# Patient Record
Sex: FEMALE | Race: WHITE | NOT HISPANIC OR LATINO
[De-identification: names, ages, dates, MRNs, and addresses within clinical notes are randomized per-mention and may not be internally consistent; named-entity substitution may affect disease eponyms.]

---

## 2017-01-09 ENCOUNTER — RESULT REVIEW (OUTPATIENT)
Age: 82
End: 2017-01-09

## 2017-06-07 ENCOUNTER — APPOINTMENT (OUTPATIENT)
Dept: ORTHOPEDIC SURGERY | Facility: CLINIC | Age: 82
End: 2017-06-07

## 2017-06-07 VITALS — BODY MASS INDEX: 23.16 KG/M2 | HEIGHT: 60 IN | WEIGHT: 118 LBS

## 2017-06-07 VITALS — HEART RATE: 109 BPM | SYSTOLIC BLOOD PRESSURE: 125 MMHG | DIASTOLIC BLOOD PRESSURE: 82 MMHG

## 2017-06-23 ENCOUNTER — APPOINTMENT (OUTPATIENT)
Dept: ORTHOPEDIC SURGERY | Facility: CLINIC | Age: 82
End: 2017-06-23

## 2017-07-12 ENCOUNTER — APPOINTMENT (OUTPATIENT)
Dept: ORTHOPEDIC SURGERY | Facility: CLINIC | Age: 82
End: 2017-07-12

## 2017-07-14 ENCOUNTER — APPOINTMENT (OUTPATIENT)
Dept: ORTHOPEDIC SURGERY | Facility: CLINIC | Age: 82
End: 2017-07-14

## 2017-07-14 VITALS
BODY MASS INDEX: 23.56 KG/M2 | HEART RATE: 79 BPM | SYSTOLIC BLOOD PRESSURE: 135 MMHG | WEIGHT: 120 LBS | DIASTOLIC BLOOD PRESSURE: 82 MMHG | HEIGHT: 60 IN

## 2017-07-14 DIAGNOSIS — M54.16 RADICULOPATHY, LUMBAR REGION: ICD-10-CM

## 2017-07-14 DIAGNOSIS — M51.26 OTHER INTERVERTEBRAL DISC DISPLACEMENT, LUMBAR REGION: ICD-10-CM

## 2017-07-26 ENCOUNTER — OUTPATIENT (OUTPATIENT)
Dept: OUTPATIENT SERVICES | Facility: HOSPITAL | Age: 82
LOS: 1 days | End: 2017-07-26
Payer: MEDICARE

## 2017-07-26 VITALS
TEMPERATURE: 98 F | HEIGHT: 60 IN | SYSTOLIC BLOOD PRESSURE: 120 MMHG | DIASTOLIC BLOOD PRESSURE: 84 MMHG | HEART RATE: 76 BPM | WEIGHT: 117.07 LBS | RESPIRATION RATE: 14 BRPM

## 2017-07-26 DIAGNOSIS — Z90.49 ACQUIRED ABSENCE OF OTHER SPECIFIED PARTS OF DIGESTIVE TRACT: Chronic | ICD-10-CM

## 2017-07-26 DIAGNOSIS — Z90.3 ACQUIRED ABSENCE OF STOMACH [PART OF]: Chronic | ICD-10-CM

## 2017-07-26 DIAGNOSIS — M54.16 RADICULOPATHY, LUMBAR REGION: ICD-10-CM

## 2017-07-26 DIAGNOSIS — Z98.890 OTHER SPECIFIED POSTPROCEDURAL STATES: Chronic | ICD-10-CM

## 2017-07-26 DIAGNOSIS — M51.26 OTHER INTERVERTEBRAL DISC DISPLACEMENT, LUMBAR REGION: ICD-10-CM

## 2017-07-26 DIAGNOSIS — I35.0 NONRHEUMATIC AORTIC (VALVE) STENOSIS: ICD-10-CM

## 2017-07-26 DIAGNOSIS — Z98.41 CATARACT EXTRACTION STATUS, RIGHT EYE: Chronic | ICD-10-CM

## 2017-07-26 LAB
ANTIBODY ID 1_1: SIGNIFICANT CHANGE UP
ANTIBODY ID 1_2: SIGNIFICANT CHANGE UP
APTT BLD: 37.4 SEC — SIGNIFICANT CHANGE UP (ref 27.5–37.4)
BLD GP AB SCN SERPL QL: POSITIVE — SIGNIFICANT CHANGE UP
BUN SERPL-MCNC: 19 MG/DL — SIGNIFICANT CHANGE UP (ref 7–23)
CALCIUM SERPL-MCNC: 9.6 MG/DL — SIGNIFICANT CHANGE UP (ref 8.4–10.5)
CHLORIDE SERPL-SCNC: 104 MMOL/L — SIGNIFICANT CHANGE UP (ref 98–107)
CO2 SERPL-SCNC: 24 MMOL/L — SIGNIFICANT CHANGE UP (ref 22–31)
CREAT SERPL-MCNC: 0.91 MG/DL — SIGNIFICANT CHANGE UP (ref 0.5–1.3)
DAT POLY-SP REAG RBC QL: NEGATIVE — SIGNIFICANT CHANGE UP
GGT SERPL-CCNC: 19 U/L — SIGNIFICANT CHANGE UP (ref 5–36)
GLUCOSE SERPL-MCNC: 93 MG/DL — SIGNIFICANT CHANGE UP (ref 70–99)
HBA1C BLD-MCNC: 6.1 % — HIGH (ref 4–5.6)
HCT VFR BLD CALC: 40.8 % — SIGNIFICANT CHANGE UP (ref 34.5–45)
HGB BLD-MCNC: 13.3 G/DL — SIGNIFICANT CHANGE UP (ref 11.5–15.5)
INR BLD: 1.06 — SIGNIFICANT CHANGE UP (ref 0.88–1.17)
MCHC RBC-ENTMCNC: 29.8 PG — SIGNIFICANT CHANGE UP (ref 27–34)
MCHC RBC-ENTMCNC: 32.6 % — SIGNIFICANT CHANGE UP (ref 32–36)
MCV RBC AUTO: 91.5 FL — SIGNIFICANT CHANGE UP (ref 80–100)
NRBC # FLD: 0 — SIGNIFICANT CHANGE UP
PLATELET # BLD AUTO: 209 K/UL — SIGNIFICANT CHANGE UP (ref 150–400)
PMV BLD: 11.7 FL — SIGNIFICANT CHANGE UP (ref 7–13)
POTASSIUM SERPL-MCNC: 4.1 MMOL/L — SIGNIFICANT CHANGE UP (ref 3.5–5.3)
POTASSIUM SERPL-SCNC: 4.1 MMOL/L — SIGNIFICANT CHANGE UP (ref 3.5–5.3)
PROTHROM AB SERPL-ACNC: 11.9 SEC — SIGNIFICANT CHANGE UP (ref 9.8–13.1)
RBC # BLD: 4.46 M/UL — SIGNIFICANT CHANGE UP (ref 3.8–5.2)
RBC # FLD: 13.3 % — SIGNIFICANT CHANGE UP (ref 10.3–14.5)
RH IG SCN BLD-IMP: NEGATIVE — SIGNIFICANT CHANGE UP
SODIUM SERPL-SCNC: 142 MMOL/L — SIGNIFICANT CHANGE UP (ref 135–145)
WBC # BLD: 8.4 K/UL — SIGNIFICANT CHANGE UP (ref 3.8–10.5)
WBC # FLD AUTO: 8.4 K/UL — SIGNIFICANT CHANGE UP (ref 3.8–10.5)

## 2017-07-26 PROCEDURE — 86077 PHYS BLOOD BANK SERV XMATCH: CPT

## 2017-07-26 NOTE — H&P PST ADULT - RS GEN PE MLT RESP DETAILS PC
good air movement/no chest wall tenderness/breath sounds equal/no intercostal retractions/airway patent/respirations non-labored/clear to auscultation bilaterally/no wheezes

## 2017-07-26 NOTE — H&P PST ADULT - PSH
H/O laminectomy  april 2005  History of partial gastrectomy  50% of stomach was removed.  S/P appendectomy    S/P bilateral cataract extraction  10 years ago H/O laminectomy  april 2005  History of partial gastrectomy  secondary to ulcer  S/P appendectomy    S/P bilateral cataract extraction  10 years ago

## 2017-07-26 NOTE — H&P PST ADULT - PROBLEM SELECTOR PLAN 1
Scheduled for L4-L5 Lumbar Laminectomy on 8/10/2017.   Pre op instructions given, pt verbalized understanding   Chlorhexidine wash and GI prophylaxis provided  Pt was seen by her PCP for medical clearance and referred to cardiology   Medical clearance pending

## 2017-07-26 NOTE — H&P PST ADULT - NEGATIVE NEUROLOGICAL SYMPTOMS
no hemiparesis/no focal seizures/no weakness/no syncope/no tremors/no generalized seizures/no facial palsy/no loss of consciousness/no confusion/no paresthesias/no transient paralysis/no vertigo/no headache

## 2017-07-26 NOTE — H&P PST ADULT - NEGATIVE CARDIOVASCULAR SYMPTOMS
no chest pain/no orthopnea/no paroxysmal nocturnal dyspnea/no peripheral edema/no dyspnea on exertion/no palpitations/no claudication

## 2017-07-26 NOTE — H&P PST ADULT - HISTORY OF PRESENT ILLNESS
82 y/o female presents to [facility] for preoperative evaluation with diagnosis of intervertebral disc displacement, lumbar region and radiculopathy. H/o lumbar back pain x 14 years and s/p lumbar laminectomy in April 2005 with relief of pain. Patient states 2 years ago lower back pain returned and became severe limiting her ROM. She also reports difficulty standing straight for prolonged periods and radiating pain to her left leg with ambulation. She is scheduled for L4-L5 Lumbar Laminectomy on 8/10/2017.

## 2017-07-26 NOTE — H&P PST ADULT - PROBLEM SELECTOR PLAN 2
Pt was seen by her cardiologist on 7/25 for cardiac clearance  She is scheduled for myocardial perfusion imaging on 7/27, clearance pending   2D ECHO and EKG in chart

## 2017-07-26 NOTE — H&P PST ADULT - NEGATIVE GENERAL GENITOURINARY SYMPTOMS
no bladder infections/no renal colic/no incontinence/no urinary hesitancy/no flank pain L/no flank pain R/normal urinary frequency/no hematuria

## 2017-07-26 NOTE — H&P PST ADULT - NEGATIVE BREAST SYMPTOMS
no breast lump R/no breast lump L/no nipple discharge R/no breast tenderness L/no breast tenderness R/no nipple discharge L

## 2017-07-26 NOTE — H&P PST ADULT - NSANTHOSAYNRD_GEN_A_CORE
No. PETTY screening performed.  STOP BANG Legend: 0-2 = LOW Risk; 3-4 = INTERMEDIATE Risk; 5-8 = HIGH Risk/never tested

## 2017-07-26 NOTE — H&P PST ADULT - PMH
Abnormal echocardiogram  pt currently wearing a holter monitor  Acid reflux    Aortic stenosis  moderate  Bleeding disorder  pt states her blood takes a long time to clot  Borderline diabetes    HLD (hyperlipidemia)    Other intervertebral disc displacement, lumbar region    Poor historian    Radiculopathy    Shingles outbreak  in 2005 2 months after back surgery Abnormal echocardiogram  pt currently wearing a holter monitor  Acid reflux    Ankle fracture  left  Aortic stenosis  moderate  Bleeding disorder  pt states her blood takes a long time to clot  Borderline diabetes    HLD (hyperlipidemia)    Osteoporosis    Other intervertebral disc displacement, lumbar region    Poor historian    Radiculopathy    Shingles outbreak  in 2005 2 months after back surgery Abnormal echocardiogram  pt currently wearing a holter monitor  Acid reflux    Ankle fracture  left  Aortic stenosis  moderate by ECHO 7/2017  Bleeding disorder  pt states her blood takes a long time to clot  Borderline diabetes    Former smoker    HLD (hyperlipidemia)    Osteoporosis    Other intervertebral disc displacement, lumbar region    Poor historian    Radiculopathy    Shingles outbreak  in 2005 2 months after back surgery

## 2017-07-26 NOTE — H&P PST ADULT - NEGATIVE OPHTHALMOLOGIC SYMPTOMS
no photophobia/no lacrimation R/no diplopia/no blurred vision R/no blurred vision L/no pain R/no pain L

## 2017-08-02 ENCOUNTER — APPOINTMENT (OUTPATIENT)
Dept: ORTHOPEDIC SURGERY | Facility: CLINIC | Age: 82
End: 2017-08-02
Payer: MEDICARE

## 2017-08-02 DIAGNOSIS — S82.892A OTHER FRACTURE OF LEFT LOWER LEG, INITIAL ENCOUNTER FOR CLOSED FRACTURE: ICD-10-CM

## 2017-08-02 PROCEDURE — 99213 OFFICE O/P EST LOW 20 MIN: CPT

## 2017-08-02 PROCEDURE — 73610 X-RAY EXAM OF ANKLE: CPT | Mod: LT

## 2017-08-04 PROBLEM — S82.892A OTHER FRACTURE OF LEFT LOWER LEG, INITIAL ENCOUNTER FOR CLOSED FRACTURE: Status: ACTIVE | Noted: 2017-06-23

## 2017-08-10 ENCOUNTER — APPOINTMENT (OUTPATIENT)
Dept: ORTHOPEDIC SURGERY | Facility: HOSPITAL | Age: 82
End: 2017-08-10

## 2017-08-10 ENCOUNTER — INPATIENT (INPATIENT)
Facility: HOSPITAL | Age: 82
LOS: 5 days | Discharge: HOME HEALTH | End: 2017-08-16
Attending: ORTHOPAEDIC SURGERY | Admitting: ORTHOPAEDIC SURGERY
Payer: MEDICARE

## 2017-08-10 ENCOUNTER — TRANSCRIPTION ENCOUNTER (OUTPATIENT)
Age: 82
End: 2017-08-10

## 2017-08-10 ENCOUNTER — RESULT REVIEW (OUTPATIENT)
Age: 82
End: 2017-08-10

## 2017-08-10 VITALS
HEIGHT: 60 IN | OXYGEN SATURATION: 96 % | TEMPERATURE: 98 F | RESPIRATION RATE: 18 BRPM | SYSTOLIC BLOOD PRESSURE: 140 MMHG | HEART RATE: 78 BPM | DIASTOLIC BLOOD PRESSURE: 78 MMHG | WEIGHT: 117.07 LBS

## 2017-08-10 DIAGNOSIS — Z90.3 ACQUIRED ABSENCE OF STOMACH [PART OF]: Chronic | ICD-10-CM

## 2017-08-10 DIAGNOSIS — Z98.41 CATARACT EXTRACTION STATUS, RIGHT EYE: Chronic | ICD-10-CM

## 2017-08-10 DIAGNOSIS — Z90.49 ACQUIRED ABSENCE OF OTHER SPECIFIED PARTS OF DIGESTIVE TRACT: Chronic | ICD-10-CM

## 2017-08-10 DIAGNOSIS — Z98.890 OTHER SPECIFIED POSTPROCEDURAL STATES: Chronic | ICD-10-CM

## 2017-08-10 DIAGNOSIS — M54.16 RADICULOPATHY, LUMBAR REGION: ICD-10-CM

## 2017-08-10 LAB
BASOPHILS # BLD AUTO: 0.05 K/UL — SIGNIFICANT CHANGE UP (ref 0–0.2)
BASOPHILS NFR BLD AUTO: 0.4 % — SIGNIFICANT CHANGE UP (ref 0–2)
BUN SERPL-MCNC: 17 MG/DL — SIGNIFICANT CHANGE UP (ref 7–23)
CALCIUM SERPL-MCNC: 8.6 MG/DL — SIGNIFICANT CHANGE UP (ref 8.4–10.5)
CHLORIDE SERPL-SCNC: 108 MMOL/L — HIGH (ref 98–107)
CO2 SERPL-SCNC: 23 MMOL/L — SIGNIFICANT CHANGE UP (ref 22–31)
CREAT SERPL-MCNC: 0.86 MG/DL — SIGNIFICANT CHANGE UP (ref 0.5–1.3)
EOSINOPHIL # BLD AUTO: 0.14 K/UL — SIGNIFICANT CHANGE UP (ref 0–0.5)
EOSINOPHIL NFR BLD AUTO: 1.2 % — SIGNIFICANT CHANGE UP (ref 0–6)
GLUCOSE SERPL-MCNC: 154 MG/DL — HIGH (ref 70–99)
HCT VFR BLD CALC: 36.1 % — SIGNIFICANT CHANGE UP (ref 34.5–45)
HGB BLD-MCNC: 11.7 G/DL — SIGNIFICANT CHANGE UP (ref 11.5–15.5)
IMM GRANULOCYTES # BLD AUTO: 0.06 # — SIGNIFICANT CHANGE UP
IMM GRANULOCYTES NFR BLD AUTO: 0.5 % — SIGNIFICANT CHANGE UP (ref 0–1.5)
LYMPHOCYTES # BLD AUTO: 1.11 K/UL — SIGNIFICANT CHANGE UP (ref 1–3.3)
LYMPHOCYTES # BLD AUTO: 9.2 % — LOW (ref 13–44)
MCHC RBC-ENTMCNC: 29.5 PG — SIGNIFICANT CHANGE UP (ref 27–34)
MCHC RBC-ENTMCNC: 32.4 % — SIGNIFICANT CHANGE UP (ref 32–36)
MCV RBC AUTO: 90.9 FL — SIGNIFICANT CHANGE UP (ref 80–100)
MONOCYTES # BLD AUTO: 0.73 K/UL — SIGNIFICANT CHANGE UP (ref 0–0.9)
MONOCYTES NFR BLD AUTO: 6 % — SIGNIFICANT CHANGE UP (ref 2–14)
NEUTROPHILS # BLD AUTO: 10.02 K/UL — HIGH (ref 1.8–7.4)
NEUTROPHILS NFR BLD AUTO: 82.7 % — HIGH (ref 43–77)
NRBC # FLD: 0 — SIGNIFICANT CHANGE UP
PLATELET # BLD AUTO: 203 K/UL — SIGNIFICANT CHANGE UP (ref 150–400)
PMV BLD: 11.2 FL — SIGNIFICANT CHANGE UP (ref 7–13)
POTASSIUM SERPL-MCNC: 4.6 MMOL/L — SIGNIFICANT CHANGE UP (ref 3.5–5.3)
POTASSIUM SERPL-SCNC: 4.6 MMOL/L — SIGNIFICANT CHANGE UP (ref 3.5–5.3)
RBC # BLD: 3.97 M/UL — SIGNIFICANT CHANGE UP (ref 3.8–5.2)
RBC # FLD: 13.2 % — SIGNIFICANT CHANGE UP (ref 10.3–14.5)
RH IG SCN BLD-IMP: NEGATIVE — SIGNIFICANT CHANGE UP
SODIUM SERPL-SCNC: 140 MMOL/L — SIGNIFICANT CHANGE UP (ref 135–145)
WBC # BLD: 12.11 K/UL — HIGH (ref 3.8–10.5)
WBC # FLD AUTO: 12.11 K/UL — HIGH (ref 3.8–10.5)

## 2017-08-10 PROCEDURE — 72100 X-RAY EXAM L-S SPINE 2/3 VWS: CPT | Mod: 26

## 2017-08-10 PROCEDURE — 22612 ARTHRD PST TQ 1NTRSPC LUMBAR: CPT

## 2017-08-10 PROCEDURE — 88304 TISSUE EXAM BY PATHOLOGIST: CPT | Mod: 26

## 2017-08-10 PROCEDURE — 63048 LAM FACETEC &FORAMOT EA ADDL: CPT

## 2017-08-10 PROCEDURE — 63047 LAM FACETEC & FORAMOT LUMBAR: CPT | Mod: 82

## 2017-08-10 PROCEDURE — 22612 ARTHRD PST TQ 1NTRSPC LUMBAR: CPT | Mod: 82

## 2017-08-10 PROCEDURE — 88311 DECALCIFY TISSUE: CPT | Mod: 26

## 2017-08-10 PROCEDURE — 63267 EXCISE INTRSPINL LESION LMBR: CPT | Mod: 82,59

## 2017-08-10 PROCEDURE — 63048 LAM FACETEC &FORAMOT EA ADDL: CPT | Mod: 82

## 2017-08-10 PROCEDURE — 63267 EXCISE INTRSPINL LESION LMBR: CPT | Mod: 59

## 2017-08-10 PROCEDURE — 63047 LAM FACETEC & FORAMOT LUMBAR: CPT

## 2017-08-10 RX ORDER — DOCUSATE SODIUM 100 MG
100 CAPSULE ORAL THREE TIMES A DAY
Qty: 0 | Refills: 0 | Status: DISCONTINUED | OUTPATIENT
Start: 2017-08-10 | End: 2017-08-16

## 2017-08-10 RX ORDER — OXYCODONE AND ACETAMINOPHEN 5; 325 MG/1; MG/1
1 TABLET ORAL EVERY 4 HOURS
Qty: 0 | Refills: 0 | Status: DISCONTINUED | OUTPATIENT
Start: 2017-08-10 | End: 2017-08-11

## 2017-08-10 RX ORDER — ONDANSETRON 8 MG/1
4 TABLET, FILM COATED ORAL EVERY 6 HOURS
Qty: 0 | Refills: 0 | Status: DISCONTINUED | OUTPATIENT
Start: 2017-08-10 | End: 2017-08-12

## 2017-08-10 RX ORDER — METOPROLOL TARTRATE 50 MG
25 TABLET ORAL DAILY
Qty: 0 | Refills: 0 | Status: DISCONTINUED | OUTPATIENT
Start: 2017-08-10 | End: 2017-08-16

## 2017-08-10 RX ORDER — FAMOTIDINE 10 MG/ML
20 INJECTION INTRAVENOUS EVERY 24 HOURS
Qty: 0 | Refills: 0 | Status: DISCONTINUED | OUTPATIENT
Start: 2017-08-10 | End: 2017-08-10

## 2017-08-10 RX ORDER — DIPHENHYDRAMINE HCL 50 MG
12.5 CAPSULE ORAL EVERY 4 HOURS
Qty: 0 | Refills: 0 | Status: DISCONTINUED | OUTPATIENT
Start: 2017-08-10 | End: 2017-08-16

## 2017-08-10 RX ORDER — OXYCODONE AND ACETAMINOPHEN 5; 325 MG/1; MG/1
2 TABLET ORAL EVERY 6 HOURS
Qty: 0 | Refills: 0 | Status: DISCONTINUED | OUTPATIENT
Start: 2017-08-10 | End: 2017-08-11

## 2017-08-10 RX ORDER — ACETAMINOPHEN 500 MG
650 TABLET ORAL EVERY 6 HOURS
Qty: 0 | Refills: 0 | Status: DISCONTINUED | OUTPATIENT
Start: 2017-08-10 | End: 2017-08-11

## 2017-08-10 RX ORDER — SENNA PLUS 8.6 MG/1
2 TABLET ORAL AT BEDTIME
Qty: 0 | Refills: 0 | Status: DISCONTINUED | OUTPATIENT
Start: 2017-08-10 | End: 2017-08-16

## 2017-08-10 RX ORDER — SODIUM CHLORIDE 9 MG/ML
1000 INJECTION INTRAMUSCULAR; INTRAVENOUS; SUBCUTANEOUS
Qty: 0 | Refills: 0 | Status: DISCONTINUED | OUTPATIENT
Start: 2017-08-10 | End: 2017-08-12

## 2017-08-10 RX ORDER — ACETAMINOPHEN 500 MG
650 TABLET ORAL EVERY 6 HOURS
Qty: 0 | Refills: 0 | Status: DISCONTINUED | OUTPATIENT
Start: 2017-08-10 | End: 2017-08-16

## 2017-08-10 RX ORDER — SIMVASTATIN 20 MG/1
20 TABLET, FILM COATED ORAL AT BEDTIME
Qty: 0 | Refills: 0 | Status: DISCONTINUED | OUTPATIENT
Start: 2017-08-10 | End: 2017-08-16

## 2017-08-10 RX ORDER — GABAPENTIN 400 MG/1
600 CAPSULE ORAL
Qty: 0 | Refills: 0 | Status: DISCONTINUED | OUTPATIENT
Start: 2017-08-10 | End: 2017-08-11

## 2017-08-10 RX ORDER — PANTOPRAZOLE SODIUM 20 MG/1
40 TABLET, DELAYED RELEASE ORAL
Qty: 0 | Refills: 0 | Status: DISCONTINUED | OUTPATIENT
Start: 2017-08-10 | End: 2017-08-16

## 2017-08-10 RX ORDER — HYDROMORPHONE HYDROCHLORIDE 2 MG/ML
0.5 INJECTION INTRAMUSCULAR; INTRAVENOUS; SUBCUTANEOUS
Qty: 0 | Refills: 0 | Status: DISCONTINUED | OUTPATIENT
Start: 2017-08-10 | End: 2017-08-10

## 2017-08-10 RX ADMIN — ONDANSETRON 4 MILLIGRAM(S): 8 TABLET, FILM COATED ORAL at 12:08

## 2017-08-10 RX ADMIN — Medication 100 MILLIGRAM(S): at 21:16

## 2017-08-10 RX ADMIN — SIMVASTATIN 20 MILLIGRAM(S): 20 TABLET, FILM COATED ORAL at 21:16

## 2017-08-10 RX ADMIN — GABAPENTIN 600 MILLIGRAM(S): 400 CAPSULE ORAL at 15:28

## 2017-08-10 RX ADMIN — OXYCODONE AND ACETAMINOPHEN 2 TABLET(S): 5; 325 TABLET ORAL at 19:34

## 2017-08-10 RX ADMIN — Medication 25 MILLIGRAM(S): at 15:28

## 2017-08-10 RX ADMIN — SENNA PLUS 2 TABLET(S): 8.6 TABLET ORAL at 21:16

## 2017-08-10 RX ADMIN — GABAPENTIN 600 MILLIGRAM(S): 400 CAPSULE ORAL at 19:06

## 2017-08-10 RX ADMIN — OXYCODONE AND ACETAMINOPHEN 2 TABLET(S): 5; 325 TABLET ORAL at 20:30

## 2017-08-10 RX ADMIN — SODIUM CHLORIDE 75 MILLILITER(S): 9 INJECTION INTRAMUSCULAR; INTRAVENOUS; SUBCUTANEOUS at 11:30

## 2017-08-10 RX ADMIN — Medication 100 MILLIGRAM(S): at 15:28

## 2017-08-10 NOTE — ASU PATIENT PROFILE, ADULT - PSH
H/O laminectomy  april 2005  History of partial gastrectomy  secondary to ulcer  S/P appendectomy    S/P bilateral cataract extraction  10 years ago

## 2017-08-10 NOTE — BRIEF OPERATIVE NOTE - POST-OP DX
Lumbar herniated disc  08/10/2017    Active  Sheldon Gale  Lumbar stenosis  08/10/2017    Active  Sheldon Gale

## 2017-08-10 NOTE — ASU PATIENT PROFILE, ADULT - PMH
Abnormal echocardiogram  pt currently wearing a holter monitor  Acid reflux    Ankle fracture  left  Aortic stenosis  moderate by ECHO 7/2017  Bleeding disorder  pt states her blood takes a long time to clot  Borderline diabetes    Former smoker    HLD (hyperlipidemia)    Osteoporosis    Other intervertebral disc displacement, lumbar region    Poor historian    Radiculopathy    Shingles outbreak  in 2005 2 months after back surgery

## 2017-08-10 NOTE — PROGRESS NOTE ADULT - SUBJECTIVE AND OBJECTIVE BOX
ORTHO-POST OP CHECK     EAMON WELLS 81y Female is now s/p rev L4-L5 Lami/disc insitu fusion POD #0. Pt was seen and evaluated at bedside. Pain well controlled, pt denies any cp/sob/n/v/ha at this time.     Vital Signs Last 24 Hrs  T(C): 36.4 (10 Aug 2017 13:44), Max: 36.6 (10 Aug 2017 07:06)  T(F): 97.5 (10 Aug 2017 13:44), Max: 97.9 (10 Aug 2017 07:06)  HR: 66 (10 Aug 2017 13:44) (52 - 78)  BP: 136/75 (10 Aug 2017 13:44) (121/64 - 140/78)  BP(mean): --  RR: 16 (10 Aug 2017 13:44) (12 - 19)  SpO2: 96% (10 Aug 2017 13:44) (95% - 100%)                        11.7   12.11 )-----------( 203      ( 10 Aug 2017 10:22 )             36.1        PE:  Gen: NAD  Spine:  BLE   Dressing C/D/I HV in place   Motor: EHL/FHL/TA/G/S intact 5/5 B/L   Sensation: Intact to light touch bilaterally   Compartments soft compressible  Distal pulses present     A/P   EAMON WELLS 81y Female is now s/p rev L4-L5 Lami/disc insitu fusion POD #0. Pain well controlled denies any cp/sob/n/v/ha at this time.  -Pain control  -DVT ppx  -Antibiotics x24hrs  -IVF  -PO Diet  -PT/OT  -WBAT, OOB

## 2017-08-10 NOTE — BRIEF OPERATIVE NOTE - PROCEDURE
In situ fusion of spine  08/10/2017  L4/5  Active  SALEXANDER  Laminectomy and microdiscectomy, lumbar  08/10/2017  REVISION L4/5 LAMI/DISC ON LEFT  Active  SALEXANDER

## 2017-08-11 DIAGNOSIS — R73.03 PREDIABETES: ICD-10-CM

## 2017-08-11 DIAGNOSIS — K21.9 GASTRO-ESOPHAGEAL REFLUX DISEASE WITHOUT ESOPHAGITIS: ICD-10-CM

## 2017-08-11 DIAGNOSIS — I35.0 NONRHEUMATIC AORTIC (VALVE) STENOSIS: ICD-10-CM

## 2017-08-11 DIAGNOSIS — M51.26 OTHER INTERVERTEBRAL DISC DISPLACEMENT, LUMBAR REGION: ICD-10-CM

## 2017-08-11 DIAGNOSIS — E78.4 OTHER HYPERLIPIDEMIA: ICD-10-CM

## 2017-08-11 LAB
BASOPHILS # BLD AUTO: 0.03 K/UL — SIGNIFICANT CHANGE UP (ref 0–0.2)
BASOPHILS NFR BLD AUTO: 0.2 % — SIGNIFICANT CHANGE UP (ref 0–2)
BUN SERPL-MCNC: 17 MG/DL — SIGNIFICANT CHANGE UP (ref 7–23)
CALCIUM SERPL-MCNC: 8.7 MG/DL — SIGNIFICANT CHANGE UP (ref 8.4–10.5)
CHLORIDE SERPL-SCNC: 104 MMOL/L — SIGNIFICANT CHANGE UP (ref 98–107)
CO2 SERPL-SCNC: 22 MMOL/L — SIGNIFICANT CHANGE UP (ref 22–31)
CREAT SERPL-MCNC: 0.88 MG/DL — SIGNIFICANT CHANGE UP (ref 0.5–1.3)
EOSINOPHIL # BLD AUTO: 0.01 K/UL — SIGNIFICANT CHANGE UP (ref 0–0.5)
EOSINOPHIL NFR BLD AUTO: 0.1 % — SIGNIFICANT CHANGE UP (ref 0–6)
GLUCOSE SERPL-MCNC: 106 MG/DL — HIGH (ref 70–99)
HCT VFR BLD CALC: 37.5 % — SIGNIFICANT CHANGE UP (ref 34.5–45)
HGB BLD-MCNC: 12.1 G/DL — SIGNIFICANT CHANGE UP (ref 11.5–15.5)
IMM GRANULOCYTES # BLD AUTO: 0.05 # — SIGNIFICANT CHANGE UP
IMM GRANULOCYTES NFR BLD AUTO: 0.4 % — SIGNIFICANT CHANGE UP (ref 0–1.5)
LYMPHOCYTES # BLD AUTO: 0.94 K/UL — LOW (ref 1–3.3)
LYMPHOCYTES # BLD AUTO: 6.8 % — LOW (ref 13–44)
MCHC RBC-ENTMCNC: 29.5 PG — SIGNIFICANT CHANGE UP (ref 27–34)
MCHC RBC-ENTMCNC: 32.3 % — SIGNIFICANT CHANGE UP (ref 32–36)
MCV RBC AUTO: 91.5 FL — SIGNIFICANT CHANGE UP (ref 80–100)
MONOCYTES # BLD AUTO: 1.06 K/UL — HIGH (ref 0–0.9)
MONOCYTES NFR BLD AUTO: 7.7 % — SIGNIFICANT CHANGE UP (ref 2–14)
NEUTROPHILS # BLD AUTO: 11.71 K/UL — HIGH (ref 1.8–7.4)
NEUTROPHILS NFR BLD AUTO: 84.8 % — HIGH (ref 43–77)
NRBC # FLD: 0 — SIGNIFICANT CHANGE UP
PLATELET # BLD AUTO: 195 K/UL — SIGNIFICANT CHANGE UP (ref 150–400)
PMV BLD: 11.8 FL — SIGNIFICANT CHANGE UP (ref 7–13)
POTASSIUM SERPL-MCNC: 5.3 MMOL/L — SIGNIFICANT CHANGE UP (ref 3.5–5.3)
POTASSIUM SERPL-SCNC: 5.3 MMOL/L — SIGNIFICANT CHANGE UP (ref 3.5–5.3)
RBC # BLD: 4.1 M/UL — SIGNIFICANT CHANGE UP (ref 3.8–5.2)
RBC # FLD: 13.3 % — SIGNIFICANT CHANGE UP (ref 10.3–14.5)
SODIUM SERPL-SCNC: 141 MMOL/L — SIGNIFICANT CHANGE UP (ref 135–145)
WBC # BLD: 13.8 K/UL — HIGH (ref 3.8–10.5)
WBC # FLD AUTO: 13.8 K/UL — HIGH (ref 3.8–10.5)

## 2017-08-11 PROCEDURE — 99223 1ST HOSP IP/OBS HIGH 75: CPT | Mod: AI

## 2017-08-11 RX ORDER — GABAPENTIN 400 MG/1
600 CAPSULE ORAL THREE TIMES A DAY
Qty: 0 | Refills: 0 | Status: DISCONTINUED | OUTPATIENT
Start: 2017-08-11 | End: 2017-08-16

## 2017-08-11 RX ORDER — OXYCODONE HYDROCHLORIDE 5 MG/1
10 TABLET ORAL EVERY 4 HOURS
Qty: 0 | Refills: 0 | Status: DISCONTINUED | OUTPATIENT
Start: 2017-08-11 | End: 2017-08-14

## 2017-08-11 RX ORDER — ACETAMINOPHEN 500 MG
650 TABLET ORAL EVERY 6 HOURS
Qty: 0 | Refills: 0 | Status: COMPLETED | OUTPATIENT
Start: 2017-08-11 | End: 2017-08-13

## 2017-08-11 RX ORDER — OXYCODONE HYDROCHLORIDE 5 MG/1
5 TABLET ORAL EVERY 4 HOURS
Qty: 0 | Refills: 0 | Status: DISCONTINUED | OUTPATIENT
Start: 2017-08-11 | End: 2017-08-14

## 2017-08-11 RX ORDER — OXYCODONE HYDROCHLORIDE 5 MG/1
2.5 TABLET ORAL EVERY 4 HOURS
Qty: 0 | Refills: 0 | Status: DISCONTINUED | OUTPATIENT
Start: 2017-08-11 | End: 2017-08-14

## 2017-08-11 RX ADMIN — GABAPENTIN 600 MILLIGRAM(S): 400 CAPSULE ORAL at 02:12

## 2017-08-11 RX ADMIN — SODIUM CHLORIDE 75 MILLILITER(S): 9 INJECTION INTRAMUSCULAR; INTRAVENOUS; SUBCUTANEOUS at 23:20

## 2017-08-11 RX ADMIN — OXYCODONE AND ACETAMINOPHEN 2 TABLET(S): 5; 325 TABLET ORAL at 06:26

## 2017-08-11 RX ADMIN — SIMVASTATIN 20 MILLIGRAM(S): 20 TABLET, FILM COATED ORAL at 23:07

## 2017-08-11 RX ADMIN — ONDANSETRON 4 MILLIGRAM(S): 8 TABLET, FILM COATED ORAL at 19:14

## 2017-08-11 RX ADMIN — PANTOPRAZOLE SODIUM 40 MILLIGRAM(S): 20 TABLET, DELAYED RELEASE ORAL at 06:20

## 2017-08-11 RX ADMIN — OXYCODONE HYDROCHLORIDE 10 MILLIGRAM(S): 5 TABLET ORAL at 11:00

## 2017-08-11 RX ADMIN — GABAPENTIN 600 MILLIGRAM(S): 400 CAPSULE ORAL at 23:07

## 2017-08-11 RX ADMIN — OXYCODONE AND ACETAMINOPHEN 2 TABLET(S): 5; 325 TABLET ORAL at 07:15

## 2017-08-11 RX ADMIN — Medication 650 MILLIGRAM(S): at 12:15

## 2017-08-11 RX ADMIN — Medication 100 MILLIGRAM(S): at 06:21

## 2017-08-11 RX ADMIN — Medication 100 MILLIGRAM(S): at 23:07

## 2017-08-11 RX ADMIN — OXYCODONE HYDROCHLORIDE 10 MILLIGRAM(S): 5 TABLET ORAL at 10:09

## 2017-08-11 RX ADMIN — SODIUM CHLORIDE 75 MILLILITER(S): 9 INJECTION INTRAMUSCULAR; INTRAVENOUS; SUBCUTANEOUS at 19:15

## 2017-08-11 RX ADMIN — GABAPENTIN 600 MILLIGRAM(S): 400 CAPSULE ORAL at 06:21

## 2017-08-11 RX ADMIN — Medication 100 MILLIGRAM(S): at 12:14

## 2017-08-11 RX ADMIN — SENNA PLUS 2 TABLET(S): 8.6 TABLET ORAL at 23:07

## 2017-08-11 RX ADMIN — Medication 650 MILLIGRAM(S): at 23:07

## 2017-08-11 RX ADMIN — Medication 25 MILLIGRAM(S): at 06:21

## 2017-08-11 NOTE — CONSULT NOTE ADULT - PROBLEM SELECTOR RECOMMENDATION 2
stable. No signs of CHF  monitoring volume status  c/w metoprolol for arrthymia diagnosed 2 weeks ago.

## 2017-08-11 NOTE — CONSULT NOTE ADULT - PROBLEM SELECTOR RECOMMENDATION 9
L4-L5 Lumbar Laminectomy on 8/10/2017. POD #1  management as per ortho  pain controlled with Oxy IR prn  c/w PT

## 2017-08-11 NOTE — CONSULT NOTE ADULT - ASSESSMENT
81 y.o. Female w/ hx high chol, prediabetes, moderate aortic stenosis, GERD, lumbar intervertebral disc displacement w/ radiculopathy, s/p lumbar laminectomy in April 2005 p/w 2 years of recurrent lower back pain now  L4-L5 Lumbar Laminectomy on 8/10/2017. POD #1

## 2017-08-11 NOTE — PROGRESS NOTE ADULT - SUBJECTIVE AND OBJECTIVE BOX
· Subjective and Objective: 	                                                                                             ORTHO-SPINE PROGRESS NOTE      EAMON WELLS 81y Female is now s/p rev L4-L5 Lami/disc insitu fusion POD #1. Pt was seen and evaluated at bedside by  and spine team. Pain well controlled, pt denies any cp/sob/n/v/ha at this time.     Vital Signs Last 24 Hrs  T(C): 36.4 (10 Aug 2017 13:44), Max: 36.6 (10 Aug 2017 07:06)  T(F): 97.5 (10 Aug 2017 13:44), Max: 97.9 (10 Aug 2017 07:06)  HR: 66 (10 Aug 2017 13:44) (52 - 78)  BP: 136/75 (10 Aug 2017 13:44) (121/64 - 140/78)  BP(mean): --  RR: 16 (10 Aug 2017 13:44) (12 - 19)  SpO2: 96% (10 Aug 2017 13:44) (95% - 100%)                        11.7   12.11 )-----------( 203      ( 10 Aug 2017 10:22 )             36.1        PE:  Gen: NAD  Spine:  BLE   Dressing C/D/I HV in place   Motor: EHL/FHL/TA/G/S intact 5/5 B/L   Sensation: Intact to light touch bilaterally   Compartments soft compressible  Distal pulses present     A/P   EAMON WELLS 81y Female is now s/p rev L4-L5 Lami/disc insitu fusion POD #1. Pain well controlled denies any cp/sob/n/v/ha at this time.  -Pain control  -DVT ppx  -Antibiotics x24hrs  -IVF  -PO Diet  -WBAT OOB  -PT/OT  -DC planning     QUEENIE TSAI SPINE COORDINATOR · Subjective and Objective: 	                                                                                             ORTHO-SPINE PROGRESS NOTE      EAMON WELLS 81y Female is now s/p rev L4-L5 Lami/disc insitu fusion POD #1. Pt was seen and evaluated at bedside by  and spine team. Pain well controlled, pt denies any cp/sob/n/v/ha at this time.     Vital Signs Last 24 Hrs  T(C): 36.4 (10 Aug 2017 13:44), Max: 36.6 (10 Aug 2017 07:06)  T(F): 97.5 (10 Aug 2017 13:44), Max: 97.9 (10 Aug 2017 07:06)  HR: 66 (10 Aug 2017 13:44) (52 - 78)  BP: 136/75 (10 Aug 2017 13:44) (121/64 - 140/78)  BP(mean): --  RR: 16 (10 Aug 2017 13:44) (12 - 19)  SpO2: 96% (10 Aug 2017 13:44) (95% - 100%)                        11.7   12.11 )-----------( 203      ( 10 Aug 2017 10:22 )             36.1        PE:  Gen: NAD  Spine:  BLE   Dressing C/D/I HV in place   Motor: EHL/FHL/TA/G/S intact 5/5 B/L   Sensation: Intact to light touch bilaterally   Compartments soft compressible  Distal pulses present     A/P   EAMON WELLS 81y Female is now s/p rev L4-L5 Lami/disc insitu fusion POD #1. Pain well controlled denies any cp/sob/n/v/ha at this time.  -Pain control  -DVT ppx  -Antibiotics x24hrs  -IVF  -PO Diet  -WBAT OOB  -PT/OT  -DC planning   patient was seen and examined by me on rounds today with a spine team, I agree with the above, Dr. eNlly RUELAS PAC SPINE COORDINATOR

## 2017-08-11 NOTE — PROGRESS NOTE ADULT - SUBJECTIVE AND OBJECTIVE BOX
Patient seen and examined. No acute events overnight. Pain well controlled. Will walk with PT. denies numbness/tingling/paresthesias/weakness. Denies headaches. Denies fevers/chills. No other complaints at this time.    HEALTH ISSUES - PROBLEM Dx:          MEDICATIONS  (STANDING):  sodium chloride 0.9%. 1000 milliLiter(s) IV Continuous <Continuous>  docusate sodium 100 milliGRAM(s) Oral three times a day  senna 2 Tablet(s) Oral at bedtime  gabapentin 600 milliGRAM(s) Oral four times a day  simvastatin 20 milliGRAM(s) Oral at bedtime  metoprolol succinate ER 25 milliGRAM(s) Oral daily  pantoprazole    Tablet 40 milliGRAM(s) Oral before breakfast      Allergies    contrast media (gadolinium-based) (Other)  Keflex (Unknown)  penicillin (Unknown)    Intolerances        PAST MEDICAL & SURGICAL HISTORY:  Former smoker  Ankle fracture: left  Osteoporosis  Aortic stenosis: moderate by ECHO 7/2017  Bleeding disorder: pt states her blood takes a long time to clot  Shingles outbreak: in 2005 2 months after back surgery  Poor historian  Abnormal echocardiogram: pt currently wearing a holter monitor  Acid reflux  HLD (hyperlipidemia)  Borderline diabetes  Radiculopathy  Other intervertebral disc displacement, lumbar region  S/P bilateral cataract extraction: 10 years ago  S/P appendectomy  History of partial gastrectomy: secondary to ulcer  H/O laminectomy: april 2005                            11.7   12.11 )-----------( 203      ( 10 Aug 2017 10:22 )             36.1       10 Aug 2017 10:22    140    |  108    |  17     ----------------------------<  154    4.6     |  23     |  0.86     Ca    8.6        10 Aug 2017 10:22                Vital Signs Last 24 Hrs  T(C): 36.6 (08-11-17 @ 06:15), Max: 36.6 (08-10-17 @ 07:06)  T(F): 97.9 (08-11-17 @ 06:15), Max: 97.9 (08-10-17 @ 07:06)  HR: 60 (08-11-17 @ 06:15) (52 - 78)  BP: 134/69 (08-11-17 @ 06:15) (106/61 - 140/78)  BP(mean): --  RR: 16 (08-11-17 @ 06:15) (12 - 19)  SpO2: 96% (08-11-17 @ 06:15) (95% - 100%)    Gen: NAD    Spine PE:  Dressing clean dry intact  HV drain serosang out put: 70/160    Motor:                   C5                C6              C7               C8           T1   R            5/5                5/5            5/5             5/5          5/5  L             5/5               5/5             5/5             5/5          5/5                L2             L3             L4               L5            S1  R         5/5           5/5          5/5             5/5           5/5  L          5/5          5/5           5/5             5/5           5/5    Sensory:            C5         C6         C7      C8       T1        (0=absent, 1=impaired, 2=normal, NT=not testable)  R         2            2           2        2         2  L          2            2           2        2         2               L2          L3         L4      L5       S1         (0=absent, 1=impaired, 2=normal, NT=not testable)  R         2            2            2        2        2  L          2            2           2        2         2      A/P: 81y Female POD L4-5 laminectomy/discectomy and insitu fusion  Pain control  Cont HV drain  WBAT/PT/OT/OOB  Brace for comfort  FU Labs  SCDs  Medical co-management appreciated  dispo planning

## 2017-08-11 NOTE — CONSULT NOTE ADULT - SUBJECTIVE AND OBJECTIVE BOX
Patient is a 81y old  Female who presents with a chief complaint of     HPI:  81 y.o. Female w/ hx high chol, prediabetes, moderate Aortic stenosis, GERD, diagnosis of intervertebral disc displacement, lumbar region and radiculopathy. H/o lumbar back pain x 14 years and s/p lumbar laminectomy in April 2005 with relief of pain. Patient states 2 years ago lower back pain returned and became severe limiting her ROM. She also reports difficulty standing straight for prolonged periods and radiating pain to her left leg with ambulation. now  L4-L5 Lumbar Laminectomy on 8/10/2017.       Pain Symptoms if applicable:             	                         none	   mild         moderate         severe  Pain:	                            0	    1-3	     4-6	         7-10  Location:	  Modifying factors:	  Associated symptoms:	    Function: [ ] Independent  [ ] Assistance  [ ] Total care  [ ] Non-ambulatory    Allergies    contrast media (gadolinium-based) (Other)  Keflex (Unknown)  penicillin (Unknown)    Intolerances        HOME MEDICATIONS: [x ] Reviewed  gabapentin 600 mg oral tablet: Last Dose Taken:  , 1 tab(s) orally 4 times a day. 9am, 1 pm,  	 5 pm, 9 pm  simvastatin 20 mg oral tablet: Last Dose Taken:  , 1 tab(s) orally once a day (at bedtime)  omeprazole 20 mg oral delayed release capsule: Last Dose Taken:  , 1 cap(s) orally once a day. dinner-time  Centrum Silver oral tablet: Last Dose Taken:  , 1 tab(s) orally once a day. Last dose 7/24/17    MEDICATIONS  (STANDING):  sodium chloride 0.9%. 1000 milliLiter(s) (75 mL/Hr) IV Continuous <Continuous>  docusate sodium 100 milliGRAM(s) Oral three times a day  senna 2 Tablet(s) Oral at bedtime  simvastatin 20 milliGRAM(s) Oral at bedtime  metoprolol succinate ER 25 milliGRAM(s) Oral daily  pantoprazole    Tablet 40 milliGRAM(s) Oral before breakfast  acetaminophen   Tablet. 650 milliGRAM(s) Oral every 6 hours  gabapentin 600 milliGRAM(s) Oral three times a day    MEDICATIONS  (PRN):  acetaminophen   Tablet 650 milliGRAM(s) Oral every 6 hours PRN For Temp greater than 38 C (100.4 F)  diphenhydrAMINE   Injectable 12.5 milliGRAM(s) IV Push every 4 hours PRN Itching  aluminum hydroxide/magnesium hydroxide/simethicone Suspension 30 milliLiter(s) Oral every 12 hours PRN Indigestion  ondansetron Injectable 4 milliGRAM(s) IV Push every 6 hours PRN Nausea  oxyCODONE    IR 2.5 milliGRAM(s) Oral every 4 hours PRN Mild Pain (1 - 3)  oxyCODONE    IR 5 milliGRAM(s) Oral every 4 hours PRN Moderate Pain (4 - 6)  oxyCODONE    IR 10 milliGRAM(s) Oral every 4 hours PRN Severe Pain (7 - 10)      PAST MEDICAL & SURGICAL HISTORY:  Former smoker  Ankle fracture: left  Osteoporosis  Aortic stenosis: moderate by ECHO 7/2017  Bleeding disorder: pt states her blood takes a long time to clot  Shingles outbreak: in 2005 2 months after back surgery  Poor historian  Abnormal echocardiogram: pt currently wearing a holter monitor  Acid reflux  HLD (hyperlipidemia)  Borderline diabetes  Radiculopathy  Other intervertebral disc displacement, lumbar region  S/P bilateral cataract extraction: 10 years ago  S/P appendectomy  History of partial gastrectomy: secondary to ulcer  H/O laminectomy: april 2005  [x ] Reviewed     SOCIAL HISTORY:  Residence: [ ] [facility]  [ ] [facility]  [x ] Community  [x ] Substance abuse: none  [x ] Tobacco: quit in 1990 smoked 3 PPD x 40 years  [x ] Alcohol use: none    FAMILY HISTORY:  Family history of colon cancer (Sibling)  Family history of breast cancer in mother  Family history of heart disease (Sibling)      REVIEW OF SYSTEMS:    CONSTITUTIONAL: No fever, weight loss, or fatigue  EYES: No eye pain, visual disturbances, or discharge  ENMT:  No difficulty hearing, tinnitus, vertigo; No sinus or throat pain  NECK: No pain or stiffness  BREASTS: No pain, masses, or nipple discharge  RESPIRATORY: No cough, wheezing, chills or hemoptysis; No shortness of breath  CARDIOVASCULAR: No chest pain, palpitations, dizziness, or leg swelling  GASTROINTESTINAL: No abdominal or epigastric pain. No nausea, vomiting, or hematemesis; No diarrhea or constipation. No melena or hematochezia.  GENITOURINARY: No dysuria, frequency, hematuria, or incontinence  NEUROLOGICAL: No headaches, memory loss, loss of strength, numbness, or tremors  SKIN: No itching, burning, rashes, or lesions   LYMPH NODES: No enlarged glands  ENDOCRINE: No heat or cold intolerance; No hair loss  MUSCULOSKELETAL: No muscle or back pain  PSYCHIATRIC: No depression, anxiety, mood swings, or difficulty sleeping  HEME/LYMPH: No easy bruising, or bleeding gums  ALLERGY AND IMMUNOLOGIC: No hives or eczema    [ x ] All other ROS negative  [  ] Unable to obtain due to poor mental status    Vital Signs Last 24 Hrs  T(C): 36.7 (11 Aug 2017 09:16), Max: 36.7 (11 Aug 2017 09:16)  T(F): 98.1 (11 Aug 2017 09:16), Max: 98.1 (11 Aug 2017 09:16)  HR: 51 (11 Aug 2017 09:16) (51 - 63)  BP: 114/55 (11 Aug 2017 09:16) (106/61 - 134/69)  BP(mean): --  RR: 16 (11 Aug 2017 09:16) (16 - 17)  SpO2: 95% (11 Aug 2017 09:16) (95% - 99%)    PHYSICAL EXAM:    GENERAL: NAD, well-groomed, well-developed  HEAD:  Atraumatic, Normocephalic  EYES: EOMI, PERRLA, conjunctiva and sclera clear  ENMT: Moist mucous membranes  NECK: Supple, No JVD  RESPIRATORY: Clear to auscultation bilaterally; No rales, rhonchi, wheezing, or rubs  CARDIOVASCULAR: Regular rate and rhythm; No murmurs, rubs, or gallops  GASTROINTESTINAL: Soft, Nontender, Nondistended; Bowel sounds present  GENITOURINARY: Not examined  EXTREMITIES:  2+ Peripheral Pulses, No clubbing, cyanosis, or edema  NERVOUS SYSTEM:  Alert & Oriented X3; Moving all 4 extremities; No gross sensory deficits  HEME/LYMPH: No lymphadenopathy noted  SKIN: No rashes or lesions; Incisions C/D/I    LABS:                        12.1   13.80 )-----------( 195      ( 11 Aug 2017 06:50 )             37.5     08-11    141  |  104  |  17  ----------------------------<  106<H>  5.3   |  22  |  0.88    Ca    8.7      11 Aug 2017 06:50          CAPILLARY BLOOD GLUCOSE  123 (10 Aug 2017 07:29)          RADIOLOGY & ADDITIONAL STUDIES:    EKG:   Personally Reviewed:  [ ] YES     Imaging: < from: Xray Lumbar Spine AP + Lateral (08.10.17 @ 09:30) >  Distal tips of 2 surgical clamps project over the L4 and L5 spinous   processes.    Multilevel degenerative disease also apparent.    Correlate with preoperative workup and intraoperative findings.       < end of copied text >    Personally Reviewed:  [ x] YES               Consultant(s) notes reviewed:    Care Discussed with Consultant(s)/Other Providers: Ortho Patient is a 81y old  Female who presents with a chief complaint of     HPI:  81 y.o. Female w/ hx high chol, prediabetes, moderate aortic stenosis, GERD, lumbar intervertebral disc displacement w/ radiculopathy, s/p lumbar laminectomy in April 2005 with relief of pain. Patient states 2 years ago lower back pain returned and became severe limiting her ROM. She also reported difficulty standing straight for prolonged periods and radiating pain to her left leg with ambulation. Patient now  L4-L5 Lumbar Laminectomy on 8/10/2017. Patient denies cp, SOB, abdominal pain, N/V/D.       Pain Symptoms if applicable:             	                         none	   mild         moderate         severe  Pain: 4	                            0	    1-3	     4-6	         7-10  Location: low back	  Modifying factors: movement	  Associated symptoms:	    Function: [ ] Independent  [x ] Assistance  [ ] Total care  [ ] Non-ambulatory    Allergies    contrast media (gadolinium-based) (Other)  Keflex (Unknown)  penicillin (Unknown)    Intolerances        HOME MEDICATIONS: [x ] Reviewed  gabapentin 600 mg oral tablet: Last Dose Taken:  , 1 tab(s) orally 4 times a day. 9am, 1 pm,  	 5 pm, 9 pm  simvastatin 20 mg oral tablet: Last Dose Taken:  , 1 tab(s) orally once a day (at bedtime)  omeprazole 20 mg oral delayed release capsule: Last Dose Taken:  , 1 cap(s) orally once a day. dinner-time  Centrum Silver oral tablet: Last Dose Taken:  , 1 tab(s) orally once a day. Last dose 7/24/17    MEDICATIONS  (STANDING):  sodium chloride 0.9%. 1000 milliLiter(s) (75 mL/Hr) IV Continuous <Continuous>  docusate sodium 100 milliGRAM(s) Oral three times a day  senna 2 Tablet(s) Oral at bedtime  simvastatin 20 milliGRAM(s) Oral at bedtime  metoprolol succinate ER 25 milliGRAM(s) Oral daily  pantoprazole    Tablet 40 milliGRAM(s) Oral before breakfast  acetaminophen   Tablet. 650 milliGRAM(s) Oral every 6 hours  gabapentin 600 milliGRAM(s) Oral three times a day    MEDICATIONS  (PRN):  acetaminophen   Tablet 650 milliGRAM(s) Oral every 6 hours PRN For Temp greater than 38 C (100.4 F)  diphenhydrAMINE   Injectable 12.5 milliGRAM(s) IV Push every 4 hours PRN Itching  aluminum hydroxide/magnesium hydroxide/simethicone Suspension 30 milliLiter(s) Oral every 12 hours PRN Indigestion  ondansetron Injectable 4 milliGRAM(s) IV Push every 6 hours PRN Nausea  oxyCODONE    IR 2.5 milliGRAM(s) Oral every 4 hours PRN Mild Pain (1 - 3)  oxyCODONE    IR 5 milliGRAM(s) Oral every 4 hours PRN Moderate Pain (4 - 6)  oxyCODONE    IR 10 milliGRAM(s) Oral every 4 hours PRN Severe Pain (7 - 10)      PAST MEDICAL & SURGICAL HISTORY:  Former smoker  Ankle fracture: left  Osteoporosis  Aortic stenosis: moderate by ECHO 7/2017  Bleeding disorder: pt states her blood takes a long time to clot  Shingles outbreak: in 2005 2 months after back surgery  Poor historian  Abnormal echocardiogram: pt currently wearing a holter monitor  Acid reflux  HLD (hyperlipidemia)  Borderline diabetes  Radiculopathy  Other intervertebral disc displacement, lumbar region  S/P bilateral cataract extraction: 10 years ago  S/P appendectomy  History of partial gastrectomy: secondary to ulcer  H/O laminectomy: april 2005  [x ] Reviewed     SOCIAL HISTORY:  Residence: [ ] [facility]  [ ] [facility]  [x ] Community  [x ] Substance abuse: none  [x ] Tobacco: quit in 1990 smoked 3 PPD x 40 years  [x ] Alcohol use: none    FAMILY HISTORY:  Family history of colon cancer (Sibling)  Family history of breast cancer in mother  Family history of heart disease (Sibling)      REVIEW OF SYSTEMS:    CONSTITUTIONAL: No fever, weight loss, or fatigue  EYES: No eye pain, visual disturbances, or discharge  ENMT:  No difficulty hearing, tinnitus, vertigo; No sinus or throat pain  NECK: No pain or stiffness  BREASTS: No pain, masses, or nipple discharge  RESPIRATORY: No cough, wheezing, chills or hemoptysis; No shortness of breath  CARDIOVASCULAR: No chest pain, palpitations, dizziness, or leg swelling  GASTROINTESTINAL: No abdominal or epigastric pain. No nausea, vomiting, or hematemesis; No diarrhea or constipation. No melena or hematochezia.  GENITOURINARY: No dysuria, frequency, hematuria, or incontinence  NEUROLOGICAL: No headaches, memory loss, loss of strength, numbness, or tremors  SKIN: No itching, burning, rashes, or lesions   LYMPH NODES: No enlarged glands  ENDOCRINE: No heat or cold intolerance; No hair loss  MUSCULOSKELETAL: positive back pain  PSYCHIATRIC: No depression, anxiety, mood swings, or difficulty sleeping  HEME/LYMPH: No easy bruising, or bleeding gums  ALLERGY AND IMMUNOLOGIC: No hives or eczema    [ x ] All other ROS negative  [  ] Unable to obtain due to poor mental status    Vital Signs Last 24 Hrs  T(C): 36.7 (11 Aug 2017 09:16), Max: 36.7 (11 Aug 2017 09:16)  T(F): 98.1 (11 Aug 2017 09:16), Max: 98.1 (11 Aug 2017 09:16)  HR: 51 (11 Aug 2017 09:16) (51 - 63)  BP: 114/55 (11 Aug 2017 09:16) (106/61 - 134/69)  BP(mean): --  RR: 16 (11 Aug 2017 09:16) (16 - 17)  SpO2: 95% (11 Aug 2017 09:16) (95% - 99%)    PHYSICAL EXAM:    GENERAL: NAD, well-groomed, well-developed  HEAD:  Atraumatic, Normocephalic  EYES: EOMI, PERRLA, conjunctiva and sclera clear  ENMT: Moist mucous membranes  NECK: Supple, No JVD  RESPIRATORY: Clear to auscultation bilaterally; No rales, rhonchi, wheezing, or rubs  CARDIOVASCULAR: Regular rate and rhythm; 3/6 systolic murmur. no rubs, or gallops  GASTROINTESTINAL: Soft, Nontender, Nondistended; Bowel sounds present  GENITOURINARY: Not examined  EXTREMITIES:  2+ Peripheral Pulses, No clubbing, cyanosis, or edema  NERVOUS SYSTEM:  Alert & Oriented X3; Moving all 4 extremities; No gross sensory deficits  HEME/LYMPH: No lymphadenopathy noted  SKIN: No rashes or lesions; Incisions C/D/I    LABS:                        12.1   13.80 )-----------( 195      ( 11 Aug 2017 06:50 )             37.5     08-11    141  |  104  |  17  ----------------------------<  106<H>  5.3   |  22  |  0.88    Ca    8.7      11 Aug 2017 06:50          CAPILLARY BLOOD GLUCOSE  123 (10 Aug 2017 07:29)          RADIOLOGY & ADDITIONAL STUDIES:    EKG:   Personally Reviewed:  [ ] YES     Imaging: < from: Xray Lumbar Spine AP + Lateral (08.10.17 @ 09:30) >  Distal tips of 2 surgical clamps project over the L4 and L5 spinous   processes.    Multilevel degenerative disease also apparent.    Correlate with preoperative workup and intraoperative findings.       < end of copied text >    Personally Reviewed:  [ x] YES               Consultant(s) notes reviewed:    Care Discussed with Consultant(s)/Other Providers: Ortho

## 2017-08-12 DIAGNOSIS — Z98.890 OTHER SPECIFIED POSTPROCEDURAL STATES: ICD-10-CM

## 2017-08-12 DIAGNOSIS — Z29.9 ENCOUNTER FOR PROPHYLACTIC MEASURES, UNSPECIFIED: ICD-10-CM

## 2017-08-12 DIAGNOSIS — R50.82 POSTPROCEDURAL FEVER: ICD-10-CM

## 2017-08-12 LAB
BASOPHILS # BLD AUTO: 0.04 K/UL — SIGNIFICANT CHANGE UP (ref 0–0.2)
BASOPHILS NFR BLD AUTO: 0.3 % — SIGNIFICANT CHANGE UP (ref 0–2)
BUN SERPL-MCNC: 14 MG/DL — SIGNIFICANT CHANGE UP (ref 7–23)
CALCIUM SERPL-MCNC: 8.4 MG/DL — SIGNIFICANT CHANGE UP (ref 8.4–10.5)
CHLORIDE SERPL-SCNC: 103 MMOL/L — SIGNIFICANT CHANGE UP (ref 98–107)
CO2 SERPL-SCNC: 21 MMOL/L — LOW (ref 22–31)
CREAT SERPL-MCNC: 0.86 MG/DL — SIGNIFICANT CHANGE UP (ref 0.5–1.3)
EOSINOPHIL # BLD AUTO: 0.01 K/UL — SIGNIFICANT CHANGE UP (ref 0–0.5)
EOSINOPHIL NFR BLD AUTO: 0.1 % — SIGNIFICANT CHANGE UP (ref 0–6)
GLUCOSE SERPL-MCNC: 128 MG/DL — HIGH (ref 70–99)
HCT VFR BLD CALC: 36.9 % — SIGNIFICANT CHANGE UP (ref 34.5–45)
HGB BLD-MCNC: 11.8 G/DL — SIGNIFICANT CHANGE UP (ref 11.5–15.5)
IMM GRANULOCYTES # BLD AUTO: 0.1 # — SIGNIFICANT CHANGE UP
IMM GRANULOCYTES NFR BLD AUTO: 0.7 % — SIGNIFICANT CHANGE UP (ref 0–1.5)
LYMPHOCYTES # BLD AUTO: 1.04 K/UL — SIGNIFICANT CHANGE UP (ref 1–3.3)
LYMPHOCYTES # BLD AUTO: 6.8 % — LOW (ref 13–44)
MCHC RBC-ENTMCNC: 29.1 PG — SIGNIFICANT CHANGE UP (ref 27–34)
MCHC RBC-ENTMCNC: 32 % — SIGNIFICANT CHANGE UP (ref 32–36)
MCV RBC AUTO: 90.9 FL — SIGNIFICANT CHANGE UP (ref 80–100)
MONOCYTES # BLD AUTO: 1.4 K/UL — HIGH (ref 0–0.9)
MONOCYTES NFR BLD AUTO: 9.2 % — SIGNIFICANT CHANGE UP (ref 2–14)
NEUTROPHILS # BLD AUTO: 12.64 K/UL — HIGH (ref 1.8–7.4)
NEUTROPHILS NFR BLD AUTO: 82.9 % — HIGH (ref 43–77)
NRBC # FLD: 0 — SIGNIFICANT CHANGE UP
PLATELET # BLD AUTO: 172 K/UL — SIGNIFICANT CHANGE UP (ref 150–400)
PMV BLD: 11.5 FL — SIGNIFICANT CHANGE UP (ref 7–13)
POTASSIUM SERPL-MCNC: 4.3 MMOL/L — SIGNIFICANT CHANGE UP (ref 3.5–5.3)
POTASSIUM SERPL-SCNC: 4.3 MMOL/L — SIGNIFICANT CHANGE UP (ref 3.5–5.3)
RBC # BLD: 4.06 M/UL — SIGNIFICANT CHANGE UP (ref 3.8–5.2)
RBC # FLD: 13.7 % — SIGNIFICANT CHANGE UP (ref 10.3–14.5)
SODIUM SERPL-SCNC: 138 MMOL/L — SIGNIFICANT CHANGE UP (ref 135–145)
WBC # BLD: 15.23 K/UL — HIGH (ref 3.8–10.5)
WBC # FLD AUTO: 15.23 K/UL — HIGH (ref 3.8–10.5)

## 2017-08-12 PROCEDURE — 99233 SBSQ HOSP IP/OBS HIGH 50: CPT

## 2017-08-12 RX ORDER — ONDANSETRON 8 MG/1
4 TABLET, FILM COATED ORAL EVERY 6 HOURS
Qty: 0 | Refills: 0 | Status: COMPLETED | OUTPATIENT
Start: 2017-08-12 | End: 2017-08-13

## 2017-08-12 RX ORDER — TRAMADOL HYDROCHLORIDE 50 MG/1
25 TABLET ORAL EVERY 8 HOURS
Qty: 0 | Refills: 0 | Status: DISCONTINUED | OUTPATIENT
Start: 2017-08-12 | End: 2017-08-14

## 2017-08-12 RX ORDER — ONDANSETRON 8 MG/1
4 TABLET, FILM COATED ORAL EVERY 6 HOURS
Qty: 0 | Refills: 0 | Status: DISCONTINUED | OUTPATIENT
Start: 2017-08-13 | End: 2017-08-16

## 2017-08-12 RX ADMIN — ONDANSETRON 4 MILLIGRAM(S): 8 TABLET, FILM COATED ORAL at 17:47

## 2017-08-12 RX ADMIN — GABAPENTIN 600 MILLIGRAM(S): 400 CAPSULE ORAL at 13:18

## 2017-08-12 RX ADMIN — Medication 650 MILLIGRAM(S): at 01:16

## 2017-08-12 RX ADMIN — GABAPENTIN 600 MILLIGRAM(S): 400 CAPSULE ORAL at 21:56

## 2017-08-12 RX ADMIN — Medication 650 MILLIGRAM(S): at 17:47

## 2017-08-12 RX ADMIN — Medication 650 MILLIGRAM(S): at 13:18

## 2017-08-12 NOTE — PROGRESS NOTE ADULT - SUBJECTIVE AND OBJECTIVE BOX
Patient seen and examined. No acute events overnight. Patient is having pain and nausea but is refusing to talk any medication. Walked with PT. denies numbness/tingling/paresthesias/weakness. Denies headaches. Denies fevers/chills. No other complaints at this time.    HEALTH ISSUES - PROBLEM Dx:  GERD without esophagitis: GERD without esophagitis  Borderline diabetes: Borderline diabetes  Other hyperlipidemia: Other hyperlipidemia  Aortic valve stenosis, unspecified etiology: Aortic valve stenosis, unspecified etiology  Other intervertebral disc displacement, lumbar region: Other intervertebral disc displacement, lumbar region          MEDICATIONS  (STANDING):  sodium chloride 0.9%. 1000 milliLiter(s) IV Continuous <Continuous>  docusate sodium 100 milliGRAM(s) Oral three times a day  senna 2 Tablet(s) Oral at bedtime  simvastatin 20 milliGRAM(s) Oral at bedtime  metoprolol succinate ER 25 milliGRAM(s) Oral daily  pantoprazole    Tablet 40 milliGRAM(s) Oral before breakfast  acetaminophen   Tablet. 650 milliGRAM(s) Oral every 6 hours  gabapentin 600 milliGRAM(s) Oral three times a day      Allergies    contrast media (gadolinium-based) (Other)  Keflex (Unknown)  penicillin (Unknown)    Intolerances        PAST MEDICAL & SURGICAL HISTORY:  Former smoker  Ankle fracture: left  Osteoporosis  Aortic stenosis: moderate by ECHO 7/2017  Bleeding disorder: pt states her blood takes a long time to clot  Shingles outbreak: in 2005 2 months after back surgery  Poor historian  Abnormal echocardiogram: pt currently wearing a holter monitor  Acid reflux  HLD (hyperlipidemia)  Borderline diabetes  Radiculopathy  Other intervertebral disc displacement, lumbar region  S/P bilateral cataract extraction: 10 years ago  S/P appendectomy  History of partial gastrectomy: secondary to ulcer  H/O laminectomy: april 2005                            11.8   15.23 )-----------( 172      ( 12 Aug 2017 06:30 )             36.9       11 Aug 2017 06:50    141    |  104    |  17     ----------------------------<  106    5.3     |  22     |  0.88     Ca    8.7        11 Aug 2017 06:50                Vital Signs Last 24 Hrs  T(C): 37.3 (08-12-17 @ 05:45), Max: 38.1 (08-12-17 @ 01:38)  T(F): 99.1 (08-12-17 @ 05:45), Max: 100.5 (08-12-17 @ 01:38)  HR: 105 (08-12-17 @ 05:45) (51 - 105)  BP: 127/65 (08-12-17 @ 05:45) (106/52 - 129/72)  BP(mean): --  RR: 16 (08-12-17 @ 05:45) (16 - 16)  SpO2: 99% (08-12-17 @ 05:45) (92% - 99%)    Gen: NAD    Spine PE:  Dressing clean dry intact  HV drain serosang out put: 28/38    Motor:                   C5                C6              C7               C8           T1   R            5/5                5/5            5/5             5/5          5/5  L             5/5               5/5             5/5             5/5          5/5                L2             L3             L4               L5            S1  R         5/5           5/5          5/5             5/5           5/5  L          5/5          5/5           5/5             5/5           5/5    Sensory:            C5         C6         C7      C8       T1        (0=absent, 1=impaired, 2=normal, NT=not testable)  R         2            2           2        2         2  L          2            2           2        2         2               L2          L3         L4      L5       S1         (0=absent, 1=impaired, 2=normal, NT=not testable)  R         2            2            2        2        2  L          2            2           2        2         2      A/P: 81y Female POD L4-5 laminectomy and insitu fusion  Pain control  Cont HV drain  WBAT/PT/OT/OOB  Brace for comfort  FU Labs  SCDs  Medical co-management appreciated  dispo planning, home

## 2017-08-12 NOTE — PROGRESS NOTE ADULT - SUBJECTIVE AND OBJECTIVE BOX
CHIEF COMPLAINT: f/u back pain     SUBJECTIVE / OVERNIGHT EVENTS: Patient seen and examined. Patient c/o low grade temp overnight 100.5. Pain not well controlled. Patient is having pain and nausea but is refusing to talk any medication. Walked with PT and denies numbness/tingling/paresthesias/weakness. Denies headaches. Denies fevers/chills. No other complaints at this time.    All other review of systems negative.     MEDICATIONS  (STANDING):  sodium chloride 0.9%. 1000 milliLiter(s) (75 mL/Hr) IV Continuous <Continuous>  docusate sodium 100 milliGRAM(s) Oral three times a day  senna 2 Tablet(s) Oral at bedtime  simvastatin 20 milliGRAM(s) Oral at bedtime  metoprolol succinate ER 25 milliGRAM(s) Oral daily  pantoprazole    Tablet 40 milliGRAM(s) Oral before breakfast  acetaminophen   Tablet. 650 milliGRAM(s) Oral every 6 hours  gabapentin 600 milliGRAM(s) Oral three times a day    MEDICATIONS  (PRN):  acetaminophen   Tablet 650 milliGRAM(s) Oral every 6 hours PRN For Temp greater than 38 C (100.4 F)  diphenhydrAMINE   Injectable 12.5 milliGRAM(s) IV Push every 4 hours PRN Itching  aluminum hydroxide/magnesium hydroxide/simethicone Suspension 30 milliLiter(s) Oral every 12 hours PRN Indigestion  ondansetron Injectable 4 milliGRAM(s) IV Push every 6 hours PRN Nausea  oxyCODONE    IR 2.5 milliGRAM(s) Oral every 4 hours PRN Mild Pain (1 - 3)  oxyCODONE    IR 5 milliGRAM(s) Oral every 4 hours PRN Moderate Pain (4 - 6)  oxyCODONE    IR 10 milliGRAM(s) Oral every 4 hours PRN Severe Pain (7 - 10)  traMADol 25 milliGRAM(s) Oral every 8 hours PRN Pain    VITALS:  T(F): 98.7 (08-12-17 @ 10:15), Max: 100.5 (08-12-17 @ 01:38)  HR: 109 (08-12-17 @ 10:15) (64 - 109)  BP: 120/72 (08-12-17 @ 10:15) (106/52 - 129/72)  RR: 16 (08-12-17 @ 10:20) (16 - 16)  SpO2: 93% (08-12-17 @ 10:20)    PHYSICAL EXAM:  GENERAL: NAD, well-groomed, well-developed  HEAD:  Atraumatic, Normocephalic  EYES: EOMI, PERRLA, conjunctiva and sclera clear  ENMT: Moist mucous membranes  NECK: Supple, No JVD  RESPIRATORY: Clear to auscultation bilaterally; No rales, rhonchi, wheezing, or rubs  CARDIOVASCULAR: Regular rate and rhythm; 3/6 systolic murmur. no rubs, or gallops  GASTROINTESTINAL: Soft, Nontender, Nondistended; Bowel sounds present  GENITOURINARY: Not examined  EXTREMITIES:  2+ Peripheral Pulses, No clubbing, cyanosis, or edema  NERVOUS SYSTEM:  Alert & Oriented X3; Moving all 4 extremities; No gross sensory deficits  HEME/LYMPH: No lymphadenopathy noted  SKIN: No rashes or lesions; Incisions C/D/I    LABS:              11.8                 138  | 21   | 14           15.23 >-----------< 172     ------------------------< 128                   36.9                 4.3  | 103  | 0.86                                         Ca 8.4   Mg x     Ph x        RADIOLOGY & ADDITIONAL TESTS:  Imaging Personally Reviewed: [x] Yes    < from: Xray Lumbar Spine AP + Lateral (08.10.17 @ 09:30) >  Distal tips of 2 surgical clamps project over the L4 and L5 spinous   processes.    Multilevel degenerative disease also apparent.    < end of copied text >      [ ] Consultant(s) Notes Reviewed:  [x] Care Discussed with Consultants/Other Providers: Orthopedic PA - discussed management of pain control CHIEF COMPLAINT: f/u back pain     SUBJECTIVE / OVERNIGHT EVENTS: Patient seen and examined. Patient c/o low grade temp overnight 100.5. Pain not well controlled. Patient is having pain and nausea but is refusing to talk any medication. Walked with PT and denies numbness/tingling/paresthesias/weakness. Denies headaches. Denies fevers/chills. No other complaints at this time.    All other review of systems negative.     MEDICATIONS  (STANDING):  sodium chloride 0.9%. 1000 milliLiter(s) (75 mL/Hr) IV Continuous <Continuous>  docusate sodium 100 milliGRAM(s) Oral three times a day  senna 2 Tablet(s) Oral at bedtime  simvastatin 20 milliGRAM(s) Oral at bedtime  metoprolol succinate ER 25 milliGRAM(s) Oral daily  pantoprazole    Tablet 40 milliGRAM(s) Oral before breakfast  acetaminophen   Tablet. 650 milliGRAM(s) Oral every 6 hours  gabapentin 600 milliGRAM(s) Oral three times a day    MEDICATIONS  (PRN):  acetaminophen   Tablet 650 milliGRAM(s) Oral every 6 hours PRN For Temp greater than 38 C (100.4 F)  diphenhydrAMINE   Injectable 12.5 milliGRAM(s) IV Push every 4 hours PRN Itching  aluminum hydroxide/magnesium hydroxide/simethicone Suspension 30 milliLiter(s) Oral every 12 hours PRN Indigestion  ondansetron Injectable 4 milliGRAM(s) IV Push every 6 hours PRN Nausea  oxyCODONE    IR 2.5 milliGRAM(s) Oral every 4 hours PRN Mild Pain (1 - 3)  oxyCODONE    IR 5 milliGRAM(s) Oral every 4 hours PRN Moderate Pain (4 - 6)  oxyCODONE    IR 10 milliGRAM(s) Oral every 4 hours PRN Severe Pain (7 - 10)  traMADol 25 milliGRAM(s) Oral every 8 hours PRN Pain    VITALS:  T(F): 98.7 (08-12-17 @ 10:15), Max: 100.5 (08-12-17 @ 01:38)  HR: 109 (08-12-17 @ 10:15) (64 - 109)  BP: 120/72 (08-12-17 @ 10:15) (106/52 - 129/72)  RR: 16 (08-12-17 @ 10:20) (16 - 16)  SpO2: 93% (08-12-17 @ 10:20)    PHYSICAL EXAM:  GENERAL: nauseous  HEAD:  Atraumatic, Normocephalic  EYES: EOMI, PERRLA, conjunctiva and sclera clear  ENMT: dry  RESPIRATORY: Clear to auscultation bilaterally; No rales, rhonchi, wheezing, or rubs  CARDIOVASCULAR: Regular rate and rhythm; 3/6 systolic murmur. no rubs, or gallops  GASTROINTESTINAL: Soft, Nontender, Nondistended; Bowel sounds present  EXTREMITIES:  2+ Peripheral Pulses, No clubbing, cyanosis, or edema  SKIN: No rashes or lesions; Incisions C/D/I    LABS:              11.8                 138  | 21   | 14           15.23 >-----------< 172     ------------------------< 128                   36.9                 4.3  | 103  | 0.86                                         Ca 8.4   Mg x     Ph x        RADIOLOGY & ADDITIONAL TESTS:  Imaging Personally Reviewed: [x] Yes    < from: Xray Lumbar Spine AP + Lateral (08.10.17 @ 09:30) >  Distal tips of 2 surgical clamps project over the L4 and L5 spinous   processes.    Multilevel degenerative disease also apparent.    < end of copied text >      [ ] Consultant(s) Notes Reviewed:  [x] Care Discussed with Consultants/Other Providers: Orthopedic PA - discussed management of pain control

## 2017-08-13 DIAGNOSIS — R09.02 HYPOXEMIA: ICD-10-CM

## 2017-08-13 LAB
BASOPHILS # BLD AUTO: 0.03 K/UL — SIGNIFICANT CHANGE UP (ref 0–0.2)
BASOPHILS NFR BLD AUTO: 0.2 % — SIGNIFICANT CHANGE UP (ref 0–2)
BUN SERPL-MCNC: 13 MG/DL — SIGNIFICANT CHANGE UP (ref 7–23)
CALCIUM SERPL-MCNC: 8.8 MG/DL — SIGNIFICANT CHANGE UP (ref 8.4–10.5)
CHLORIDE SERPL-SCNC: 103 MMOL/L — SIGNIFICANT CHANGE UP (ref 98–107)
CO2 SERPL-SCNC: 23 MMOL/L — SIGNIFICANT CHANGE UP (ref 22–31)
CREAT SERPL-MCNC: 0.83 MG/DL — SIGNIFICANT CHANGE UP (ref 0.5–1.3)
EOSINOPHIL # BLD AUTO: 0.07 K/UL — SIGNIFICANT CHANGE UP (ref 0–0.5)
EOSINOPHIL NFR BLD AUTO: 0.5 % — SIGNIFICANT CHANGE UP (ref 0–6)
GLUCOSE SERPL-MCNC: 119 MG/DL — HIGH (ref 70–99)
HCT VFR BLD CALC: 39.3 % — SIGNIFICANT CHANGE UP (ref 34.5–45)
HGB BLD-MCNC: 12.4 G/DL — SIGNIFICANT CHANGE UP (ref 11.5–15.5)
IMM GRANULOCYTES # BLD AUTO: 0.04 # — SIGNIFICANT CHANGE UP
IMM GRANULOCYTES NFR BLD AUTO: 0.3 % — SIGNIFICANT CHANGE UP (ref 0–1.5)
LYMPHOCYTES # BLD AUTO: 1.08 K/UL — SIGNIFICANT CHANGE UP (ref 1–3.3)
LYMPHOCYTES # BLD AUTO: 7.5 % — LOW (ref 13–44)
MCHC RBC-ENTMCNC: 28.6 PG — SIGNIFICANT CHANGE UP (ref 27–34)
MCHC RBC-ENTMCNC: 31.6 % — LOW (ref 32–36)
MCV RBC AUTO: 90.8 FL — SIGNIFICANT CHANGE UP (ref 80–100)
MONOCYTES # BLD AUTO: 0.97 K/UL — HIGH (ref 0–0.9)
MONOCYTES NFR BLD AUTO: 6.7 % — SIGNIFICANT CHANGE UP (ref 2–14)
NEUTROPHILS # BLD AUTO: 12.26 K/UL — HIGH (ref 1.8–7.4)
NEUTROPHILS NFR BLD AUTO: 84.8 % — HIGH (ref 43–77)
NRBC # FLD: 0 — SIGNIFICANT CHANGE UP
PLATELET # BLD AUTO: 163 K/UL — SIGNIFICANT CHANGE UP (ref 150–400)
PMV BLD: 11.6 FL — SIGNIFICANT CHANGE UP (ref 7–13)
POTASSIUM SERPL-MCNC: 4.4 MMOL/L — SIGNIFICANT CHANGE UP (ref 3.5–5.3)
POTASSIUM SERPL-SCNC: 4.4 MMOL/L — SIGNIFICANT CHANGE UP (ref 3.5–5.3)
RBC # BLD: 4.33 M/UL — SIGNIFICANT CHANGE UP (ref 3.8–5.2)
RBC # FLD: 13.5 % — SIGNIFICANT CHANGE UP (ref 10.3–14.5)
SODIUM SERPL-SCNC: 140 MMOL/L — SIGNIFICANT CHANGE UP (ref 135–145)
WBC # BLD: 14.45 K/UL — HIGH (ref 3.8–10.5)
WBC # FLD AUTO: 14.45 K/UL — HIGH (ref 3.8–10.5)

## 2017-08-13 PROCEDURE — 93970 EXTREMITY STUDY: CPT | Mod: 26

## 2017-08-13 PROCEDURE — 93010 ELECTROCARDIOGRAM REPORT: CPT

## 2017-08-13 PROCEDURE — 71010: CPT | Mod: 26

## 2017-08-13 PROCEDURE — 99233 SBSQ HOSP IP/OBS HIGH 50: CPT

## 2017-08-13 RX ORDER — POLYETHYLENE GLYCOL 3350 17 G/17G
17 POWDER, FOR SOLUTION ORAL DAILY
Qty: 0 | Refills: 0 | Status: DISCONTINUED | OUTPATIENT
Start: 2017-08-13 | End: 2017-08-16

## 2017-08-13 RX ADMIN — POLYETHYLENE GLYCOL 3350 17 GRAM(S): 17 POWDER, FOR SOLUTION ORAL at 09:06

## 2017-08-13 RX ADMIN — SENNA PLUS 2 TABLET(S): 8.6 TABLET ORAL at 22:04

## 2017-08-13 RX ADMIN — GABAPENTIN 600 MILLIGRAM(S): 400 CAPSULE ORAL at 05:55

## 2017-08-13 RX ADMIN — Medication 100 MILLIGRAM(S): at 13:38

## 2017-08-13 RX ADMIN — Medication 100 MILLIGRAM(S): at 05:55

## 2017-08-13 RX ADMIN — Medication 100 MILLIGRAM(S): at 22:04

## 2017-08-13 RX ADMIN — GABAPENTIN 600 MILLIGRAM(S): 400 CAPSULE ORAL at 22:04

## 2017-08-13 RX ADMIN — Medication 25 MILLIGRAM(S): at 05:55

## 2017-08-13 RX ADMIN — GABAPENTIN 600 MILLIGRAM(S): 400 CAPSULE ORAL at 13:38

## 2017-08-13 NOTE — PROGRESS NOTE ADULT - SUBJECTIVE AND OBJECTIVE BOX
Patient seen and examined. No acute events overnight. C/o nausea from pain meds. Was started on standing zofran x24h. Added tramadol, but patient prefers not to take. Trying to control pain with gabapentin and tylenol. Also complains of constipation, wants to go but unable to.     Vital Signs Last 24 Hrs  T(C): 36.8 (13 Aug 2017 06:20), Max: 37.1 (12 Aug 2017 10:15)  T(F): 98.2 (13 Aug 2017 06:20), Max: 98.7 (12 Aug 2017 10:15)  HR: 89 (13 Aug 2017 06:20) (87 - 109)  BP: 127/67 (13 Aug 2017 06:20) (106/60 - 128/70)  BP(mean): --  RR: 16 (13 Aug 2017 06:20) (16 - 16)  SpO2: 93% (13 Aug 2017 06:20) (89% - 93%)    Gen: NAD    Spine PE:  Dressing clean dry intact    Motor:                   C5                C6              C7               C8           T1   R            5/5                5/5            5/5             5/5          5/5  L             5/5               5/5             5/5             5/5          5/5                L2             L3             L4               L5            S1  R         5/5           5/5          5/5             5/5           5/5  L          5/5          5/5           5/5             5/5           5/5    Sensory:            C5         C6         C7      C8       T1        (0=absent, 1=impaired, 2=normal, NT=not testable)  R         2            2           2        2         2  L          2            2           2        2         2               L2          L3         L4      L5       S1         (0=absent, 1=impaired, 2=normal, NT=not testable)  R         2            2            2        2        2  L          2            2           2        2         2      A/P: 81y Female POD L4-5 laminectomy and in situ fusion  Pain control  WBAT/PT/OT/OOB  Brace for comfort  SCDs for DVT ppx  Medical co-management appreciated  dispo planning, home

## 2017-08-13 NOTE — CHART NOTE - NSCHARTNOTEFT_GEN_A_CORE
Orthopedic Surgery Event Note    Patient with persistent tachycardia to HR 100s and hypoxemia to SPO2 low 90s on room air. Patient seen and examined and is mentating normally, denies chest pain, difficulty breathing. Discussed with hospitalist and Dr. Vlilegas and felt that if this persists despite mobilizing patient and OOB for prolonged period of time would recommend CT-PA to r/o PE. Patient has h/o contrast allergy and told she would go into anaphylaxis so V/Q scan was ordered for this afternoon. Patient's IV infiltrated upon injection of tracer and patient refused attempts at obtaining IV access. Exam not possible without IV access at this time. Will continue to monitor clinical status and encourage I/S and OOB. Vascular duplex study ordered to r/o DVT, which if negative would decrease likelihood of PE since patient continue to refuse IV access. Case d/w Dr. Villegas. Orthopedic Surgery Event Note    Patient with persistent tachycardia to HR 100s and hypoxemia to SPO2 low 90s on room air. Patient seen and examined and is mentating normally, denies chest pain, difficulty breathing. Discussed with hospitalist and Dr. Villegas and felt that if this persists despite mobilizing patient and OOB for prolonged period of time would recommend CT-PA to r/o PE. Patient has h/o contrast allergy and told she would go into anaphylaxis so V/Q scan was ordered for this afternoon. Patient's IV infiltrated upon injection of tracer and patient refused attempts at obtaining IV access. Exam not possible without IV access at this time. Will continue to monitor clinical status and encourage I/S and OOB. Vascular duplex study ordered to r/o DVT, which if negative would decrease likelihood of PE since patient continue to refuse IV access. Case d/w Dr. Villegas.  Agree with above and discussed with above. Dr. Villegas

## 2017-08-13 NOTE — PROGRESS NOTE ADULT - SUBJECTIVE AND OBJECTIVE BOX
CHIEF COMPLAINT: f/u back pain    SUBJECTIVE / OVERNIGHT EVENTS: Patient seen and examined. No acute events overnight. Pain well controlled and patient c/o nausea from pain meds. Was started on standing zofran x24h. Added tramadol, but patient prefers not to take. Trying to control pain with gabapentin and tylenol. Also complains of constipation.    MEDICATIONS  (STANDING):  docusate sodium 100 milliGRAM(s) Oral three times a day  senna 2 Tablet(s) Oral at bedtime  simvastatin 20 milliGRAM(s) Oral at bedtime  metoprolol succinate ER 25 milliGRAM(s) Oral daily  pantoprazole    Tablet 40 milliGRAM(s) Oral before breakfast  gabapentin 600 milliGRAM(s) Oral three times a day  ondansetron Injectable 4 milliGRAM(s) IV Push every 6 hours    MEDICATIONS  (PRN):  acetaminophen   Tablet 650 milliGRAM(s) Oral every 6 hours PRN For Temp greater than 38 C (100.4 F)  diphenhydrAMINE   Injectable 12.5 milliGRAM(s) IV Push every 4 hours PRN Itching  aluminum hydroxide/magnesium hydroxide/simethicone Suspension 30 milliLiter(s) Oral every 12 hours PRN Indigestion  oxyCODONE    IR 2.5 milliGRAM(s) Oral every 4 hours PRN Mild Pain (1 - 3)  oxyCODONE    IR 5 milliGRAM(s) Oral every 4 hours PRN Moderate Pain (4 - 6)  oxyCODONE    IR 10 milliGRAM(s) Oral every 4 hours PRN Severe Pain (7 - 10)  traMADol 25 milliGRAM(s) Oral every 8 hours PRN Pain  ondansetron Injectable 4 milliGRAM(s) IV Push every 6 hours PRN Nausea and/or Vomiting  polyethylene glycol 3350 17 Gram(s) Oral daily PRN Constipation  bisacodyl Suppository 10 milliGRAM(s) Rectal at bedtime PRN Constipation      VITALS:  T(F): 98.2 (08-13-17 @ 06:20), Max: 98.6 (08-12-17 @ 13:55)  HR: 89 (08-13-17 @ 06:20) (87 - 99)  BP: 127/67 (08-13-17 @ 06:20) (106/60 - 128/70)  RR: 16 (08-13-17 @ 06:20) (16 - 16)  SpO2: 93% (08-13-17 @ 06:20); 89-93% on room air    PHYSICAL EXAM:  GENERAL: NAD  RESPIRATORY: Clear to auscultation bilaterally; No rales, rhonchi, wheezing, or rubs  CARDIOVASCULAR: Regular rate and rhythm; 3/6 systolic murmur. no rubs, or gallops  GASTROINTESTINAL: Soft, Nontender, Nondistended; Bowel sounds present  EXTREMITIES:  2+ Peripheral Pulses, No clubbing, cyanosis, or edema  SKIN: No rashes or lesions; Incisions C/D/I    LABS:              12.4                 140  | 23   | 13           14.45 >-----------< 163     ------------------------< 119                   39.3                 4.4  | 103  | 0.83                                         Ca 8.8   Mg x     Ph x        RADIOLOGY & ADDITIONAL TESTS:  Imaging Personally Reviewed: [ ] Yes    [ ] Consultant(s) Notes Reviewed:  [x] Care Discussed with Consultants/Other Providers: Orthopedic PA - discussed hypoxemia

## 2017-08-14 ENCOUNTER — TRANSCRIPTION ENCOUNTER (OUTPATIENT)
Age: 82
End: 2017-08-14

## 2017-08-14 PROCEDURE — 99233 SBSQ HOSP IP/OBS HIGH 50: CPT

## 2017-08-14 RX ORDER — TRAMADOL HYDROCHLORIDE 50 MG/1
50 TABLET ORAL EVERY 8 HOURS
Qty: 0 | Refills: 0 | Status: DISCONTINUED | OUTPATIENT
Start: 2017-08-14 | End: 2017-08-16

## 2017-08-14 RX ORDER — ACETAMINOPHEN 500 MG
1000 TABLET ORAL ONCE
Qty: 0 | Refills: 0 | Status: COMPLETED | OUTPATIENT
Start: 2017-08-14 | End: 2017-08-14

## 2017-08-14 RX ORDER — HYDROMORPHONE HYDROCHLORIDE 2 MG/ML
2 INJECTION INTRAMUSCULAR; INTRAVENOUS; SUBCUTANEOUS EVERY 4 HOURS
Qty: 0 | Refills: 0 | Status: DISCONTINUED | OUTPATIENT
Start: 2017-08-14 | End: 2017-08-16

## 2017-08-14 RX ADMIN — SENNA PLUS 2 TABLET(S): 8.6 TABLET ORAL at 21:16

## 2017-08-14 RX ADMIN — GABAPENTIN 600 MILLIGRAM(S): 400 CAPSULE ORAL at 13:06

## 2017-08-14 RX ADMIN — Medication 100 MILLIGRAM(S): at 06:02

## 2017-08-14 RX ADMIN — Medication 25 MILLIGRAM(S): at 06:02

## 2017-08-14 RX ADMIN — SIMVASTATIN 20 MILLIGRAM(S): 20 TABLET, FILM COATED ORAL at 21:17

## 2017-08-14 RX ADMIN — Medication 100 MILLIGRAM(S): at 21:16

## 2017-08-14 RX ADMIN — Medication 100 MILLIGRAM(S): at 13:06

## 2017-08-14 RX ADMIN — TRAMADOL HYDROCHLORIDE 50 MILLIGRAM(S): 50 TABLET ORAL at 11:04

## 2017-08-14 RX ADMIN — Medication 400 MILLIGRAM(S): at 16:10

## 2017-08-14 RX ADMIN — POLYETHYLENE GLYCOL 3350 17 GRAM(S): 17 POWDER, FOR SOLUTION ORAL at 07:59

## 2017-08-14 RX ADMIN — GABAPENTIN 600 MILLIGRAM(S): 400 CAPSULE ORAL at 06:02

## 2017-08-14 RX ADMIN — GABAPENTIN 600 MILLIGRAM(S): 400 CAPSULE ORAL at 21:16

## 2017-08-14 NOTE — DISCHARGE NOTE ADULT - CARE PLAN
Principal Discharge DX:	Other intervertebral disc displacement, lumbar region  Goal:	ambulation  Instructions for follow-up, activity and diet:	weight bear as tolerated   resume same diet as prior to surgery   DR Villegas 1 week call for appt [phone] Principal Discharge DX:	Other intervertebral disc displacement, lumbar region  Goal:	ambulation  Instructions for follow-up, activity and diet:	weight bear as tolerated   resume same diet as prior to surgery   DR Villegas 1 week call for appt [phone] Principal Discharge DX:	Other intervertebral disc displacement, lumbar region  Goal:	ambulation  Instructions for follow-up, activity and diet:	weight bear as tolerated   resume same diet as prior to surgery   DR Villegas 1 week call for appt [phone] Principal Discharge DX:	Other intervertebral disc displacement, lumbar region  Goal:	ambulation  Instructions for follow-up, activity and diet:	weight bear as tolerated   resume same diet as prior to surgery   DR Villegas 1 week call for appt [phone] Principal Discharge DX:	Other intervertebral disc displacement, lumbar region  Goal:	ambulation  Instructions for follow-up, activity and diet:	weight bear as tolerated   resume same diet as prior to surgery   DR Villegas 1 week call for appt [phone] Principal Discharge DX:	Other intervertebral disc displacement, lumbar region  Goal:	ambulation  Instructions for follow-up, activity and diet:	weight bear as tolerated   resume same diet as prior to surgery   DR Villegas 1 week call for appt [phone] Principal Discharge DX:	Other intervertebral disc displacement, lumbar region  Goal:	ambulation  Instructions for follow-up, activity and diet:	weight bear as tolerated   resume same diet as prior to surgery   DR Villegas 1 week call for appt [phone] Principal Discharge DX:	Other intervertebral disc displacement, lumbar region  Goal:	ambulation  Instructions for follow-up, activity and diet:	weight bear as tolerated   resume same diet as prior to surgery   DR Villegas 1 week call for appt [phone] Principal Discharge DX:	Other intervertebral disc displacement, lumbar region  Goal:	ambulation  Instructions for follow-up, activity and diet:	weight bear as tolerated   resume same diet as prior to surgery   DR Villegas 1 week call for appt [phone] Principal Discharge DX:	Other intervertebral disc displacement, lumbar region  Goal:	ambulation  Instructions for follow-up, activity and diet:	weight bear as tolerated   resume same diet as prior to surgery   DR Villegas 1 week call for appt [phone]

## 2017-08-14 NOTE — PROVIDER CONTACT NOTE (OTHER) - RECOMMENDATIONS
order vq scan, fluids for bp, call hospitalist to come see patient
vq scan
2L NC, order ctpa
come evaluate patient, order vq scan

## 2017-08-14 NOTE — PROVIDER CONTACT NOTE (OTHER) - ACTION/TREATMENT ORDERED:
audrey dodson md coming to see patient
will determine plan of care after speaking with hospitalist
encourage oob, possible ctpa to be ordered
vq scan ordered coming to see patient

## 2017-08-14 NOTE — PROVIDER CONTACT NOTE (OTHER) - SITUATION
and o2 87 on room air, tried weaning patient off oxygen, pt denies pain but while hospitalist at bedside states she was in a lot of pain, pt medicated with tramadol and had relief,

## 2017-08-14 NOTE — DISCHARGE NOTE ADULT - CARE PROVIDER_API CALL
Asael Villegas (MD; DC), Orthopaedic Surgery  [address] [place] [address]  Phone: [phone]  Fax: [phone]

## 2017-08-14 NOTE — PROGRESS NOTE ADULT - SUBJECTIVE AND OBJECTIVE BOX
ORTHO-SPINE PA PROGRESS NOTE     EAMON WELLS 81y Female is now s/p  revision L4-5 PSIF POD #4. Pt was seen and evaluated at bedside by  the spine team. Pain well controlled, pt denies any cp/sob/n/v/ha at this time.     Vital Signs Last 24 Hrs  T(C): 36.6 (14 Aug 2017 06:00), Max: 37 (13 Aug 2017 18:55)  T(F): 97.9 (14 Aug 2017 06:00), Max: 98.6 (13 Aug 2017 18:55)  HR: 108 (14 Aug 2017 06:00) (99 - 110)  BP: 136/63 (14 Aug 2017 06:00) (115/69 - 136/78)  BP(mean): --  RR: 16 (14 Aug 2017 06:00) (16 - 16)  SpO2: 96% (14 Aug 2017 06:00) (94% - 97%)                        12.4   14.45 )-----------( 163      ( 13 Aug 2017 05:50 )             39.3        PE:  Gen: NAD, comfortable  Spine:  B/L LE   Dressing C/D/I    Motor:  R: EHL/FHL/TA/G/S intact 5/5   L: EHL/FHL/TA/G/S intact 5/5   Sensation: Intact to light touch bilaterally   Compartments soft compressible  Distal pulses present     A/P   EAMON WELLS 81y Female is now s/p  revision L4-5 PSIF POD #4. Pain well controlled denies any cp/sob/n/v/ha at this time.  -Pain control  -DVT ppx Venodynes   -IVF  -PO Diet  -PT/OT  -WBAT, OOB ORTHO-SPINE PA PROGRESS NOTE     EAMON WELLS 81y Female is now s/p  revision L4-5 PSIF POD #4. Pt was seen and evaluated at bedside by  the spine team. Pain well controlled, pt denies any cp/sob/n/v/ha at this time.     Vital Signs Last 24 Hrs  T(C): 36.6 (14 Aug 2017 06:00), Max: 37 (13 Aug 2017 18:55)  T(F): 97.9 (14 Aug 2017 06:00), Max: 98.6 (13 Aug 2017 18:55)  HR: 108 (14 Aug 2017 06:00) (99 - 110)  BP: 136/63 (14 Aug 2017 06:00) (115/69 - 136/78)  BP(mean): --  RR: 16 (14 Aug 2017 06:00) (16 - 16)  SpO2: 96% (14 Aug 2017 06:00) (94% - 97%)                        12.4   14.45 )-----------( 163      ( 13 Aug 2017 05:50 )             39.3        PE:  Gen: NAD, comfortable  Spine:  B/L LE   Dressing C/D/I    Motor:  R: EHL/FHL/TA/G/S intact 5/5   L: EHL/FHL/TA/G/S intact 5/5   Sensation: Intact to light touch bilaterally   Compartments soft compressible  Distal pulses present     A/P   EAMON WELLS 81y Female is now s/p  revision L4-5 PSIF POD #4. Pain well controlled denies any cp/sob/n/v/ha at this time.  -Pain control  -DVT ppx Venodynes   -IVF  -PO Diet  -PT/OT  -WBAT, OOB  Agree with above. Dr. Villegas

## 2017-08-14 NOTE — DISCHARGE NOTE ADULT - HOME CARE AGENCY
[place] [phone] nurse to visit day after discharge [facility] - [phone]  nurse to visit the day after hospital discharge [facility] - [phone]  Nurse to visit for evaluation the day after hospital discharge; physical therapist to follow.

## 2017-08-14 NOTE — DISCHARGE NOTE ADULT - MEDICATION SUMMARY - MEDICATIONS TO TAKE
I will START or STAY ON the medications listed below when I get home from the hospital:    HYDROmorphone 2 mg oral tablet  -- 1 tab(s) by mouth every 4 hours, As needed, Moderate Pain (4 - 6) MDD:6  -- Indication: For Pain    traMADol 50 mg oral tablet  -- 1 tab(s) by mouth every 8 hours MDD:3  -- Indication: For Pain    gabapentin 600 mg oral tablet  -- 1 tab(s) by mouth 3 times a day MDD:3  -- Indication: For Pain    simvastatin 20 mg oral tablet  -- 1 tab(s) by mouth once a day (at bedtime)  -- Indication: For chol    metoprolol succinate 25 mg oral tablet, extended release  -- 1 tab(s) by mouth once a day  -- Indication: For HTN    docusate sodium 100 mg oral capsule  -- 1 cap(s) by mouth 3 times a day  -- Indication: For Bowel regimen     senna oral tablet  -- 2 tab(s) by mouth once a day (at bedtime)  -- Indication: For Bowel regimen I will START or STAY ON the medications listed below when I get home from the hospital:    HYDROmorphone 2 mg oral tablet  -- 1 tab(s) by mouth every 4 hours, As needed, Moderate Pain (4 - 6) MDD:6  -- Indication: For Pain    traMADol 50 mg oral tablet  -- 1 tab(s) by mouth every 8 hours MDD:3  -- Indication: For Pain    rivaroxaban 15 mg oral tablet  -- 1 tab(s) by mouth 2 times a day MDD:2 for 21 days   -- Indication: For PE     rivaroxaban 20 mg oral tablet  -- 1 tab(s) by mouth every 24 hours MDD:1  -- Indication: For Start 9/6/17  for PE    gabapentin 600 mg oral tablet  -- 1 tab(s) by mouth 3 times a day MDD:3  -- Indication: For Pain    simvastatin 20 mg oral tablet  -- 1 tab(s) by mouth once a day (at bedtime)  -- Indication: For chol    metoprolol succinate 25 mg oral tablet, extended release  -- 1 tab(s) by mouth once a day  -- Indication: For HTN    docusate sodium 100 mg oral capsule  -- 1 cap(s) by mouth 3 times a day  -- Indication: For Bowel regimen     senna oral tablet  -- 2 tab(s) by mouth once a day (at bedtime)  -- Indication: For Bowel regimen

## 2017-08-14 NOTE — DISCHARGE NOTE ADULT - HOSPITAL COURSE
Pt on ECASA 81 mg po daily for anticoagulation yo is s/p  above without any intraoperative complications.  Pt is weight bear as tolerated  doing well and stable for discharge home. call surgeon's office one week to make appt. 82yo female yo is s/p  above without any intraoperative complications.   Pt developed mild hypoxemia postoperatively, was sent for VQ scan and noted to have intermediate risk for PE.  Pt was put on therapeutic Xarelto 15 mg po bid x 3 wk then 20 daily Pt is is weight bear as tolerated  doing well and stable for discharge home. Pt to follow up with Rita Saldaña NP 8/31/17 at 1145 am at her PCPs office.  call surgeon's office one week to make appt. 80yo female yo is s/p  above without any intraoperative complications.   Pt developed mild hypoxemia postoperatively, was sent for VQ scan and noted to have intermediate risk for PE.  Pt was put on therapeutic Xarelto 15 mg po bid x 3 wk then 20 daily and is medically cleared by internal medicine MD for discharge home. Pt is is weight bear as tolerated  doing well and stable for discharge home. Pt to follow up with Rita Saldaña NP 8/31/17 at 1145 am at her PCPs office.  call surgeon's office one week to make appt.

## 2017-08-14 NOTE — PROGRESS NOTE ADULT - SUBJECTIVE AND OBJECTIVE BOX
Chief complaint of lower back pain    SUBJECTIVE / OVERNIGHT EVENTS:  Pt c/o severe lower back pain, but is worried about taking pain meds due to nausea.  Denies CP, SOB, fever, chills, or lightheadedness.    MEDICATIONS  (STANDING):  docusate sodium 100 milliGRAM(s) Oral three times a day  senna 2 Tablet(s) Oral at bedtime  simvastatin 20 milliGRAM(s) Oral at bedtime  metoprolol succinate ER 25 milliGRAM(s) Oral daily  pantoprazole    Tablet 40 milliGRAM(s) Oral before breakfast  gabapentin 600 milliGRAM(s) Oral three times a day  traMADol 25 milliGRAM(s) Oral every 8 hours prn    MEDICATIONS  (PRN):  acetaminophen   Tablet 650 milliGRAM(s) Oral every 6 hours PRN For Temp greater than 38 C (100.4 F)  diphenhydrAMINE   Injectable 12.5 milliGRAM(s) IV Push every 4 hours PRN Itching  aluminum hydroxide/magnesium hydroxide/simethicone Suspension 30 milliLiter(s) Oral every 12 hours PRN Indigestion  oxyCODONE    IR 2.5 milliGRAM(s) Oral every 4 hours PRN Mild Pain (1 - 3)  oxyCODONE    IR 5 milliGRAM(s) Oral every 4 hours PRN Moderate Pain (4 - 6)  oxyCODONE    IR 10 milliGRAM(s) Oral every 4 hours PRN Severe Pain (7 - 10)  ondansetron Injectable 4 milliGRAM(s) IV Push every 6 hours PRN Nausea and/or Vomiting  polyethylene glycol 3350 17 Gram(s) Oral daily PRN Constipation  bisacodyl Suppository 10 milliGRAM(s) Rectal at bedtime PRN Constipation    Vital Signs Last 24 Hrs  T(F): 98.5 Max: 98.6   HR: 114  (99 - 114)  BP: 143/77 (115/69 - 143/77)  RR: 16   SpO2: 94% on RA    PHYSICAL EXAM:  GENERAL: NAD, well-developed  HEENT:  Atraumatic, Normocephalic; conjunctiva and sclera clear; oral mucosa moist; neck supple   CHEST/LUNG: Clear to auscultation bilaterally; No wheezes or rales  HEART: tachycardia, regular rhythm, systolic murmur RUSB  ABDOMEN: Soft, Nontender, Nondistended; Bowel sounds present  EXTREMITIES:  2+ Peripheral Pulses, No clubbing, cyanosis, or edema  PSYCH: AAOx3  NEUROLOGY: non-focal  BACK/SKIN: incision dressing c/d/i    RADS:    EXAM:  RAD CHEST PORTABLE URGENT        PROCEDURE DATE:  Aug 13 2017     INTERPRETATION:  Clinical indications: Low O2 saturation.    Frontal view of the chest is obtained.    No prior chest x-rays are available for comparison.    IMPRESSION: The heart size cannot be evaluated on this AP projection.   There are trace bilateral pleural effusions likely with associated   bibasilar areas of atelectasis.    EZE ARRIAZA M.D. ATTENDING RADIOLOGIST  This document has been electronically signed. Aug 13 2017  2:19PM            Care Discussed with Consultants/Other Providers: orthopedics Chief complaint of lower back pain    SUBJECTIVE / OVERNIGHT EVENTS:  Pt c/o severe lower back pain, but is worried about taking pain meds due to nausea.  Denies CP, SOB, fever, chills, or lightheadedness.    MEDICATIONS  (STANDING):  docusate sodium 100 milliGRAM(s) Oral three times a day  senna 2 Tablet(s) Oral at bedtime  simvastatin 20 milliGRAM(s) Oral at bedtime  metoprolol succinate ER 25 milliGRAM(s) Oral daily  pantoprazole    Tablet 40 milliGRAM(s) Oral before breakfast  gabapentin 600 milliGRAM(s) Oral three times a day  traMADol 25 milliGRAM(s) Oral every 8 hours prn    MEDICATIONS  (PRN):  acetaminophen   Tablet 650 milliGRAM(s) Oral every 6 hours PRN For Temp greater than 38 C (100.4 F)  diphenhydrAMINE   Injectable 12.5 milliGRAM(s) IV Push every 4 hours PRN Itching  aluminum hydroxide/magnesium hydroxide/simethicone Suspension 30 milliLiter(s) Oral every 12 hours PRN Indigestion  oxyCODONE    IR 2.5 milliGRAM(s) Oral every 4 hours PRN Mild Pain (1 - 3)  oxyCODONE    IR 5 milliGRAM(s) Oral every 4 hours PRN Moderate Pain (4 - 6)  oxyCODONE    IR 10 milliGRAM(s) Oral every 4 hours PRN Severe Pain (7 - 10)  ondansetron Injectable 4 milliGRAM(s) IV Push every 6 hours PRN Nausea and/or Vomiting  polyethylene glycol 3350 17 Gram(s) Oral daily PRN Constipation  bisacodyl Suppository 10 milliGRAM(s) Rectal at bedtime PRN Constipation    Vital Signs Last 24 Hrs  T(F): 98.5 Max: 98.6   HR: 114  (99 - 114)  BP: 143/77 (115/69 - 143/77)  RR: 16   SpO2: 94% on RA    PHYSICAL EXAM:  GENERAL: NAD, well-developed  HEENT:  Atraumatic, Normocephalic; conjunctiva and sclera clear; oral mucosa moist; neck supple   CHEST/LUNG: Clear to auscultation bilaterally; No wheezes or rales  HEART: tachycardia, regular rhythm, systolic murmur RUSB  ABDOMEN: Soft, Nontender, Nondistended; Bowel sounds present  EXTREMITIES:  2+ Peripheral Pulses, No clubbing, cyanosis, or edema  PSYCH: AAOx3  NEUROLOGY: non-focal  BACK/SKIN: incision dressing c/d/i    RADS:    EXAM:  RAD CHEST PORTABLE URGENT        PROCEDURE DATE:  Aug 13 2017     INTERPRETATION:  Clinical indications: Low O2 saturation.    Frontal view of the chest is obtained.    No prior chest x-rays are available for comparison.    IMPRESSION: The heart size cannot be evaluated on this AP projection.   There are trace bilateral pleural effusions likely with associated   bibasilar areas of atelectasis.    EZE ARRIAZA M.D. ATTENDING RADIOLOGIST  This document has been electronically signed. Aug 13 2017  2:19PM          EXAM:  US DPLX LWR EXT VEINS COMPL BI      PROCEDURE DATE:  Aug 13 2017       INTERPRETATION:  CLINICAL INFORMATION: Postoperative day 4 from lumbar   spinal surgery, rule out deep venous thrombosis..    COMPARISON: None available.    TECHNIQUE: Duplex sonography of the BILATERAL LOWER extremities with   color and spectral Doppler, with and without compression.      FINDINGS:    There is normal compressibility of the bilateral common femoral, femoral   and popliteal veins. No calf vein thrombosis is detected.    Doppler examination shows normal spontaneous and phasic flow.    IMPRESSION:     No evidence of bilateral lower extremity deep venous thrombosis.      ELOISA YE M.D., RADIOLOGY RESIDENT  This document has been electronically signed.  ANTHONY LARSON M.D., ATTENDING RADIOLOGIST  This document has been electronically signed. Aug 14 2017 10:42AM          Care Discussed with Consultants/Other Providers: orthopedics

## 2017-08-14 NOTE — DISCHARGE NOTE ADULT - DURABLE MEDICAL EQUIPMENT AGENCY
[facility] will deliver vac  care system to pts home once insurance auth obtained from [employer]  [id number] [facility] [place] - [phone]  the above company delivered a rolling walker to the patient in hospital on 8/15/17.

## 2017-08-14 NOTE — DISCHARGE NOTE ADULT - INSTRUCTIONS
Make a follow up appointment with Dr. Villegas. Call MD if you develop a fever, or if there is redness, swelling, drainage or pain not relieved by pain medication. No heavy lifting, bending, or straining to move your bowels. Take over the counter stool softeners as needed to prevent constipation which may be caused by pain medication. Make a follow up appointment with Dr. Villegas. Call MD if you develop a fever, or if there is redness, swelling, drainage or pain not relieved by pain medication. No heavy lifting, bending, or straining to move your bowels. Take over the counter stool softeners as needed to prevent constipation which may be caused by pain medication. Take xarelto as prescribed.

## 2017-08-14 NOTE — PROVIDER CONTACT NOTE (OTHER) - ASSESSMENT
and o2 87 on room air, pt denies pain for 2 days but while hospitalist at bedside states she was in a lot of pain, pt medicated with tramadol and had relief, HR low 100's but oxygen still 85 on room air
bp 94/65, o2 91 on room air, pt systolic runs 130's, denies dizziness or sob
Hr 105 palpated, o2 87, no acute distress noted, pt resting comfortably
tachycardic, lethargic but arousable andoriented, o2 94 on 2L NC, pt denies chest pain or SOB

## 2017-08-14 NOTE — PROVIDER CONTACT NOTE (OTHER) - REASON
and o2 87 on room air, tried weaning patient off oxygen
Hr 105 palpated, o2 87
bp 94/65
tachycardic, lethargic, o2 94 on 2L NC

## 2017-08-14 NOTE — DISCHARGE NOTE ADULT - PATIENT PORTAL LINK FT
“You can access the [employer] Patient Portal, offered by [facility], by registering with the following website: [email or web address]”

## 2017-08-14 NOTE — DISCHARGE NOTE ADULT - PLAN OF CARE
ambulation weight bear as tolerated   resume same diet as prior to surgery   DR Villegas 1 week call for appt [phone]

## 2017-08-14 NOTE — DISCHARGE NOTE ADULT - CONDITIONS AT DISCHARGE
stable stable. Pt. is afebrile and offers no complaints. In no acute distress.Back dressing: clean, dry and intact. Pt is ambulating with a walker, tolerating diet well, and voiding in adequate amounts. stable. Pt. is afebrile and offers no complaints. In no acute distress. Back dressing: clean, dry and intact. Pt is ambulating with a walker, tolerating diet well, and voiding in adequate amounts.

## 2017-08-14 NOTE — PROGRESS NOTE ADULT - SUBJECTIVE AND OBJECTIVE BOX
No acute events overnight. Pain well controlled with pain medications.    Vital Signs Last 24 Hrs  T(C): 36.6 (14 Aug 2017 06:00), Max: 37 (13 Aug 2017 18:55)  T(F): 97.9 (14 Aug 2017 06:00), Max: 98.6 (13 Aug 2017 18:55)  HR: 108 (14 Aug 2017 06:00) (99 - 110)  BP: 136/63 (14 Aug 2017 06:00) (115/69 - 136/78)  BP(mean): --  RR: 16 (14 Aug 2017 06:00) (16 - 16)  SpO2: 96% (14 Aug 2017 06:00) (94% - 97%)    Exam:  Gen: NAD  Motor: 5/5 EHL/FHL/TA/Gastrocnemius  Sensory: SILT DP/SP/S/S/T nerve distributions  Dressing: Clean, Dry, Intact    A/P: 81 year old male s/p revision L4-5 PSIF  - Pain Control  - Regular Diet  - PT/OT, OOB  - Follow up Doppler  - Discharge Planning

## 2017-08-15 PROCEDURE — 99233 SBSQ HOSP IP/OBS HIGH 50: CPT

## 2017-08-15 PROCEDURE — 71010: CPT | Mod: 26

## 2017-08-15 PROCEDURE — 78582 LUNG VENTILAT&PERFUS IMAGING: CPT | Mod: 26,GC

## 2017-08-15 RX ORDER — SIMVASTATIN 20 MG/1
1 TABLET, FILM COATED ORAL
Qty: 0 | Refills: 0 | DISCHARGE
Start: 2017-08-15

## 2017-08-15 RX ORDER — TRAMADOL HYDROCHLORIDE 50 MG/1
1 TABLET ORAL
Qty: 21 | Refills: 0 | OUTPATIENT
Start: 2017-08-15 | End: 2017-08-22

## 2017-08-15 RX ORDER — HYDROMORPHONE HYDROCHLORIDE 2 MG/ML
1 INJECTION INTRAMUSCULAR; INTRAVENOUS; SUBCUTANEOUS
Qty: 42 | Refills: 0 | OUTPATIENT
Start: 2017-08-15 | End: 2017-08-22

## 2017-08-15 RX ORDER — TRAMADOL HYDROCHLORIDE 50 MG/1
1 TABLET ORAL
Qty: 0 | Refills: 0 | DISCHARGE
Start: 2017-08-15 | End: 2017-08-22

## 2017-08-15 RX ORDER — RIVAROXABAN 15 MG-20MG
15 KIT ORAL
Qty: 0 | Refills: 0 | Status: DISCONTINUED | OUTPATIENT
Start: 2017-08-15 | End: 2017-08-16

## 2017-08-15 RX ORDER — SIMVASTATIN 20 MG/1
1 TABLET, FILM COATED ORAL
Qty: 0 | Refills: 0 | COMMUNITY

## 2017-08-15 RX ORDER — GABAPENTIN 400 MG/1
1 CAPSULE ORAL
Qty: 21 | Refills: 0 | OUTPATIENT
Start: 2017-08-15 | End: 2017-08-22

## 2017-08-15 RX ORDER — OMEPRAZOLE 10 MG/1
1 CAPSULE, DELAYED RELEASE ORAL
Qty: 0 | Refills: 0 | COMMUNITY

## 2017-08-15 RX ORDER — RIVAROXABAN 15 MG-20MG
20 KIT ORAL EVERY 24 HOURS
Qty: 0 | Refills: 0 | Status: CANCELLED | OUTPATIENT
Start: 2017-09-06 | End: 2017-08-16

## 2017-08-15 RX ORDER — GABAPENTIN 400 MG/1
1 CAPSULE ORAL
Qty: 0 | Refills: 0 | COMMUNITY

## 2017-08-15 RX ORDER — METOPROLOL TARTRATE 50 MG
1 TABLET ORAL
Qty: 0 | Refills: 0 | COMMUNITY
Start: 2017-08-15

## 2017-08-15 RX ORDER — SENNA PLUS 8.6 MG/1
2 TABLET ORAL
Qty: 0 | Refills: 0 | COMMUNITY
Start: 2017-08-15

## 2017-08-15 RX ORDER — METOPROLOL TARTRATE 50 MG
1 TABLET ORAL
Qty: 0 | Refills: 0 | COMMUNITY

## 2017-08-15 RX ORDER — DOCUSATE SODIUM 100 MG
1 CAPSULE ORAL
Qty: 0 | Refills: 0 | COMMUNITY
Start: 2017-08-15

## 2017-08-15 RX ORDER — MULTIVIT-MIN/FERROUS GLUCONATE 9 MG/15 ML
1 LIQUID (ML) ORAL
Qty: 0 | Refills: 0 | COMMUNITY

## 2017-08-15 RX ADMIN — RIVAROXABAN 15 MILLIGRAM(S): KIT at 19:29

## 2017-08-15 RX ADMIN — Medication 25 MILLIGRAM(S): at 05:27

## 2017-08-15 RX ADMIN — GABAPENTIN 600 MILLIGRAM(S): 400 CAPSULE ORAL at 14:43

## 2017-08-15 RX ADMIN — Medication 100 MILLIGRAM(S): at 14:43

## 2017-08-15 RX ADMIN — PANTOPRAZOLE SODIUM 40 MILLIGRAM(S): 20 TABLET, DELAYED RELEASE ORAL at 05:26

## 2017-08-15 RX ADMIN — HYDROMORPHONE HYDROCHLORIDE 2 MILLIGRAM(S): 2 INJECTION INTRAMUSCULAR; INTRAVENOUS; SUBCUTANEOUS at 08:53

## 2017-08-15 RX ADMIN — HYDROMORPHONE HYDROCHLORIDE 2 MILLIGRAM(S): 2 INJECTION INTRAMUSCULAR; INTRAVENOUS; SUBCUTANEOUS at 20:29

## 2017-08-15 RX ADMIN — GABAPENTIN 600 MILLIGRAM(S): 400 CAPSULE ORAL at 21:17

## 2017-08-15 RX ADMIN — Medication 100 MILLIGRAM(S): at 05:26

## 2017-08-15 RX ADMIN — HYDROMORPHONE HYDROCHLORIDE 2 MILLIGRAM(S): 2 INJECTION INTRAMUSCULAR; INTRAVENOUS; SUBCUTANEOUS at 12:26

## 2017-08-15 RX ADMIN — Medication 100 MILLIGRAM(S): at 21:17

## 2017-08-15 RX ADMIN — TRAMADOL HYDROCHLORIDE 50 MILLIGRAM(S): 50 TABLET ORAL at 05:26

## 2017-08-15 RX ADMIN — SENNA PLUS 2 TABLET(S): 8.6 TABLET ORAL at 21:17

## 2017-08-15 RX ADMIN — GABAPENTIN 600 MILLIGRAM(S): 400 CAPSULE ORAL at 05:26

## 2017-08-15 RX ADMIN — HYDROMORPHONE HYDROCHLORIDE 2 MILLIGRAM(S): 2 INJECTION INTRAMUSCULAR; INTRAVENOUS; SUBCUTANEOUS at 09:50

## 2017-08-15 RX ADMIN — HYDROMORPHONE HYDROCHLORIDE 2 MILLIGRAM(S): 2 INJECTION INTRAMUSCULAR; INTRAVENOUS; SUBCUTANEOUS at 19:29

## 2017-08-15 RX ADMIN — HYDROMORPHONE HYDROCHLORIDE 2 MILLIGRAM(S): 2 INJECTION INTRAMUSCULAR; INTRAVENOUS; SUBCUTANEOUS at 13:30

## 2017-08-15 RX ADMIN — SIMVASTATIN 20 MILLIGRAM(S): 20 TABLET, FILM COATED ORAL at 21:17

## 2017-08-15 NOTE — PROGRESS NOTE ADULT - SUBJECTIVE AND OBJECTIVE BOX
No acute events overnight. Pain well controlled with pain medications.    Vital Signs Last 24 Hrs  T(C): 36.6 (14 Aug 2017 06:00), Max: 37 (13 Aug 2017 18:55)  T(F): 97.9 (14 Aug 2017 06:00), Max: 98.6 (13 Aug 2017 18:55)  HR: 108 (14 Aug 2017 06:00) (99 - 110)  BP: 136/63 (14 Aug 2017 06:00) (115/69 - 136/78)  BP(mean): --  RR: 16 (14 Aug 2017 06:00) (16 - 16)  SpO2: 96% (14 Aug 2017 06:00) (94% - 97%)    Exam:  Gen: NAD  Motor: bilaterally 5/5 EHL/FHL/TA/Gastrocnemius  Sensory: SILT DP/SP/S/S/T nerve distributions  Dressing: Clean, Dry, Intact    A/P: 81 year old male s/p revision L4-5 PSIF POD5  - Pain Control  - Regular Diet  - PT/OT, OOB  - Follow up Doppler  - Discharge Planning

## 2017-08-15 NOTE — PROGRESS NOTE ADULT - SUBJECTIVE AND OBJECTIVE BOX
Chief complaint: back pain    SUBJECTIVE / OVERNIGHT EVENTS:  Pt has occasional desaturation on RA.     MEDICATIONS  (STANDING):  docusate sodium 100 milliGRAM(s) Oral three times a day  senna 2 Tablet(s) Oral at bedtime  simvastatin 20 milliGRAM(s) Oral at bedtime  metoprolol succinate ER 25 milliGRAM(s) Oral daily  pantoprazole    Tablet 40 milliGRAM(s) Oral before breakfast  gabapentin 600 milliGRAM(s) Oral three times a day  traMADol 50 milliGRAM(s) Oral every 8 hours    MEDICATIONS  (PRN):  acetaminophen   Tablet 650 milliGRAM(s) Oral every 6 hours PRN For Temp greater than 38 C (100.4 F)  diphenhydrAMINE   Injectable 12.5 milliGRAM(s) IV Push every 4 hours PRN Itching  aluminum hydroxide/magnesium hydroxide/simethicone Suspension 30 milliLiter(s) Oral every 12 hours PRN Indigestion  ondansetron Injectable 4 milliGRAM(s) IV Push every 6 hours PRN Nausea and/or Vomiting  polyethylene glycol 3350 17 Gram(s) Oral daily PRN Constipation  bisacodyl Suppository 10 milliGRAM(s) Rectal at bedtime PRN Constipation  HYDROmorphone   Tablet 2 milliGRAM(s) Oral every 4 hours PRN Moderate Pain (4 - 6)    Vital Signs Last 24 Hrs  T(F): 98.9   Max: 98.9   HR: 108   (75 - 108)  BP: 124/74  (94/65 - 131/81)  RR: 18    SpO2: 90% on RA    PHYSICAL EXAM:  GENERAL: NAD, well-developed  HEENT:  Atraumatic, Normocephalic; conjunctiva and sclera clear; oral mucosa moist; neck supple   CHEST/LUNG: Clear to auscultation bilaterally; No wheezes or rales  HEART: tachycardia, regular rhythm, systolic murmur RUSB  ABDOMEN: Soft, Nontender, Nondistended; Bowel sounds present  EXTREMITIES:  2+ Peripheral Pulses, No clubbing, cyanosis, or edema  PSYCH: AAOx3  BACK/SKIN: incision dressing c/d/i        Care Discussed with Consultants/Other Providers: orthopedics

## 2017-08-16 VITALS
TEMPERATURE: 98 F | DIASTOLIC BLOOD PRESSURE: 75 MMHG | HEART RATE: 99 BPM | SYSTOLIC BLOOD PRESSURE: 128 MMHG | RESPIRATION RATE: 16 BRPM | OXYGEN SATURATION: 98 %

## 2017-08-16 LAB
BUN SERPL-MCNC: 12 MG/DL — SIGNIFICANT CHANGE UP (ref 7–23)
CALCIUM SERPL-MCNC: 8.8 MG/DL — SIGNIFICANT CHANGE UP (ref 8.4–10.5)
CHLORIDE SERPL-SCNC: 99 MMOL/L — SIGNIFICANT CHANGE UP (ref 98–107)
CO2 SERPL-SCNC: 30 MMOL/L — SIGNIFICANT CHANGE UP (ref 22–31)
CREAT SERPL-MCNC: 0.67 MG/DL — SIGNIFICANT CHANGE UP (ref 0.5–1.3)
GLUCOSE SERPL-MCNC: 122 MG/DL — HIGH (ref 70–99)
HCT VFR BLD CALC: 37.1 % — SIGNIFICANT CHANGE UP (ref 34.5–45)
HGB BLD-MCNC: 12.1 G/DL — SIGNIFICANT CHANGE UP (ref 11.5–15.5)
MCHC RBC-ENTMCNC: 29.4 PG — SIGNIFICANT CHANGE UP (ref 27–34)
MCHC RBC-ENTMCNC: 32.6 % — SIGNIFICANT CHANGE UP (ref 32–36)
MCV RBC AUTO: 90.3 FL — SIGNIFICANT CHANGE UP (ref 80–100)
NRBC # FLD: 0 — SIGNIFICANT CHANGE UP
PLATELET # BLD AUTO: 208 K/UL — SIGNIFICANT CHANGE UP (ref 150–400)
PMV BLD: 10.6 FL — SIGNIFICANT CHANGE UP (ref 7–13)
POTASSIUM SERPL-MCNC: 4.2 MMOL/L — SIGNIFICANT CHANGE UP (ref 3.5–5.3)
POTASSIUM SERPL-SCNC: 4.2 MMOL/L — SIGNIFICANT CHANGE UP (ref 3.5–5.3)
RBC # BLD: 4.11 M/UL — SIGNIFICANT CHANGE UP (ref 3.8–5.2)
RBC # FLD: 13.2 % — SIGNIFICANT CHANGE UP (ref 10.3–14.5)
SODIUM SERPL-SCNC: 140 MMOL/L — SIGNIFICANT CHANGE UP (ref 135–145)
WBC # BLD: 7.45 K/UL — SIGNIFICANT CHANGE UP (ref 3.8–10.5)
WBC # FLD AUTO: 7.45 K/UL — SIGNIFICANT CHANGE UP (ref 3.8–10.5)

## 2017-08-16 PROCEDURE — 99233 SBSQ HOSP IP/OBS HIGH 50: CPT

## 2017-08-16 RX ORDER — POLYETHYLENE GLYCOL 3350 17 G/17G
17 POWDER, FOR SOLUTION ORAL ONCE
Qty: 0 | Refills: 0 | Status: COMPLETED | OUTPATIENT
Start: 2017-08-16 | End: 2017-08-16

## 2017-08-16 RX ORDER — RIVAROXABAN 15 MG-20MG
1 KIT ORAL
Qty: 42 | Refills: 0 | OUTPATIENT
Start: 2017-08-16 | End: 2017-09-06

## 2017-08-16 RX ADMIN — POLYETHYLENE GLYCOL 3350 17 GRAM(S): 17 POWDER, FOR SOLUTION ORAL at 05:29

## 2017-08-16 RX ADMIN — HYDROMORPHONE HYDROCHLORIDE 2 MILLIGRAM(S): 2 INJECTION INTRAMUSCULAR; INTRAVENOUS; SUBCUTANEOUS at 00:06

## 2017-08-16 RX ADMIN — GABAPENTIN 600 MILLIGRAM(S): 400 CAPSULE ORAL at 16:31

## 2017-08-16 RX ADMIN — Medication 100 MILLIGRAM(S): at 05:29

## 2017-08-16 RX ADMIN — HYDROMORPHONE HYDROCHLORIDE 2 MILLIGRAM(S): 2 INJECTION INTRAMUSCULAR; INTRAVENOUS; SUBCUTANEOUS at 05:29

## 2017-08-16 RX ADMIN — Medication 25 MILLIGRAM(S): at 05:29

## 2017-08-16 RX ADMIN — POLYETHYLENE GLYCOL 3350 17 GRAM(S): 17 POWDER, FOR SOLUTION ORAL at 16:30

## 2017-08-16 RX ADMIN — GABAPENTIN 600 MILLIGRAM(S): 400 CAPSULE ORAL at 05:29

## 2017-08-16 RX ADMIN — Medication 100 MILLIGRAM(S): at 16:30

## 2017-08-16 RX ADMIN — HYDROMORPHONE HYDROCHLORIDE 2 MILLIGRAM(S): 2 INJECTION INTRAMUSCULAR; INTRAVENOUS; SUBCUTANEOUS at 13:30

## 2017-08-16 RX ADMIN — PANTOPRAZOLE SODIUM 40 MILLIGRAM(S): 20 TABLET, DELAYED RELEASE ORAL at 07:24

## 2017-08-16 RX ADMIN — HYDROMORPHONE HYDROCHLORIDE 2 MILLIGRAM(S): 2 INJECTION INTRAMUSCULAR; INTRAVENOUS; SUBCUTANEOUS at 00:36

## 2017-08-16 RX ADMIN — RIVAROXABAN 15 MILLIGRAM(S): KIT at 07:24

## 2017-08-16 RX ADMIN — HYDROMORPHONE HYDROCHLORIDE 2 MILLIGRAM(S): 2 INJECTION INTRAMUSCULAR; INTRAVENOUS; SUBCUTANEOUS at 12:47

## 2017-08-16 RX ADMIN — HYDROMORPHONE HYDROCHLORIDE 2 MILLIGRAM(S): 2 INJECTION INTRAMUSCULAR; INTRAVENOUS; SUBCUTANEOUS at 06:20

## 2017-08-16 NOTE — PROGRESS NOTE ADULT - PROBLEM SELECTOR PLAN 1
Pain better controlled  - On Tramadol 50 mg po TID standing with oxycodone IR and neurontin  - Antiemetics prn  - Incentive spirometry   - bowel regimen
Pain better controlled  - change Tramadol to prn with oxycodone IR and neurontin  - Antiemetics prn  - Incentive spirometry   - bowel regimen
Pain not controlled due to intolerance with narcotic  - Trial Tramadol 50 mg po TID standing with oxycodone IR and neurontin  - Antiemetics prn  - Incentive spirometry   - bowel regimen
Pain not so well controlled as pt not able to tolerate pain meds; continue management and pain control per ortho recs with oxycodone IR and neurontin; c/w tramadol if patient accepts to take it   -pt refusing opiods at time as they cause her nausea  -zofran prn
Pain well controlled; continue management and pain control per ortho recs with oxycodone IR and neurontin; will try tramadol today to see if patients gets any relievf from that  -pt refusing opiods at time as they cause her nausea

## 2017-08-16 NOTE — PROGRESS NOTE ADULT - PROBLEM SELECTOR PROBLEM 5
Borderline diabetes
Borderline diabetes
Other hyperlipidemia

## 2017-08-16 NOTE — PROGRESS NOTE ADULT - PROBLEM SELECTOR PLAN 3
- resolved; c/w incentive spirometer
- resolved; c/w incentive spirometer
-resolved; c/w incentive spirometer   -will closely monitor and watch
Euvolemic  - monitor volume status  - c/w metoprolol as BP and HR tolerates
No signs of CHF  monitoring volume status  c/w metoprolol for arrthymia diagnosed 2 weeks ago.

## 2017-08-16 NOTE — PROGRESS NOTE ADULT - PROBLEM SELECTOR PROBLEM 7
Need for prophylactic measure
GERD without esophagitis
Need for prophylactic measure

## 2017-08-16 NOTE — PROGRESS NOTE ADULT - ASSESSMENT
81F h/o HLP, prediabetes, moderate aortic stenosis, GERD, lumbar intervertebral disc displacement w/ radiculopathy, s/p lumbar laminectomy in April 2005 p/w 2 years of recurrent lower back pain now s/p L4-L5 Lumbar Laminectomy on 8/10/2017 POD #3 c/b low grade fever now resolved.
81F h/o HLP, prediabetes, moderate aortic stenosis, GERD, lumbar intervertebral disc displacement w/ radiculopathy, s/p lumbar laminectomy in April 2005 p/w 2 years of recurrent lower back pain now s/p revision L4-5 PSIF, POD #4
81F h/o HLP, prediabetes, moderate aortic stenosis, GERD, lumbar intervertebral disc displacement w/ radiculopathy, s/p lumbar laminectomy in April 2005 p/w 2 years of recurrent lower back pain now s/p revision L4-5 PSIF, POD #5
81F h/o HLP, prediabetes, moderate aortic stenosis, GERD, lumbar intervertebral disc displacement w/ radiculopathy, s/p lumbar laminectomy in April 2005 p/w 2 years of recurrent lower back pain now s/p revision L4-5 PSIF, POD #6; Pt developed hypoxia and tachycardia, V/Q shows defects c/w PE.
81F h/o HLP, prediabetes, moderate aortic stenosis, GERD, lumbar intervertebral disc displacement w/ radiculopathy, s/p lumbar laminectomy in April 2005 p/w 2 years of recurrent lower back pain now s/p L4-L5 Lumbar Laminectomy on 8/10/2017 POD #2 c/b low grade fever overnight.

## 2017-08-16 NOTE — PROGRESS NOTE ADULT - SUBJECTIVE AND OBJECTIVE BOX
ORTHO-SPINE PA PROGRESS NOTE     EAMON WELLS 81y Female is now s/p  revision L4-5 PSIF POD #6. Pt was seen and evaluated at bedside by  the spine team. Pain well controlled, pt denies any cp/sob/n/v/ha at this time.     ICU Vital Signs Last 24 Hrs  T(C): 36.7 (16 Aug 2017 05:27), Max: 37.2 (15 Aug 2017 09:38)  T(F): 98 (16 Aug 2017 05:27), Max: 99 (15 Aug 2017 17:39)  HR: 98 (16 Aug 2017 05:27) (72 - 108)  BP: 128/76 (16 Aug 2017 05:27) (111/65 - 136/81)  BP(mean): --  ABP: --  ABP(mean): --  RR: 18 (16 Aug 2017 05:27) (15 - 18)  SpO2: 94% (16 Aug 2017 05:27) (90% - 98%)                                          12.1   7.45  )-----------( 208      ( 16 Aug 2017 05:30 )             37.1       PE:  Gen: NAD, comfortable  Spine:  B/L LE   Dressing C/D/I    Motor:  R: EHL/FHL/TA/G/S intact 5/5   L: EHL/FHL/TA/G/S intact 5/5   Sensation: Intact to light touch bilaterally   Compartments soft compressible  Distal pulses present     A/P   EAMON WELLS 81y Female is now s/p  revision L4-5 PSIF POD #6. Pain well controlled denies any cp/sob/n/v/ha at this time.  -Pain control  -DVT ppx T.Xarelto   -IVF  -PO Diet  -PT/OT  -WBAT, OOB  -Dopplers negative  -VQ scan intermediate ORTHO-SPINE PA PROGRESS NOTE     EAMON WELLS 81y Female is now s/p  revision L4-5 PSIF POD #6. Pt was seen and evaluated at bedside by  the spine team. Pain well controlled, pt denies any cp/sob/n/v/ha at this time.     ICU Vital Signs Last 24 Hrs  T(C): 36.7 (16 Aug 2017 05:27), Max: 37.2 (15 Aug 2017 09:38)  T(F): 98 (16 Aug 2017 05:27), Max: 99 (15 Aug 2017 17:39)  HR: 98 (16 Aug 2017 05:27) (72 - 108)  BP: 128/76 (16 Aug 2017 05:27) (111/65 - 136/81)  BP(mean): --  ABP: --  ABP(mean): --  RR: 18 (16 Aug 2017 05:27) (15 - 18)  SpO2: 94% (16 Aug 2017 05:27) (90% - 98%)                                          12.1   7.45  )-----------( 208      ( 16 Aug 2017 05:30 )             37.1       PE:  Gen: NAD, comfortable  Spine:  B/L LE   Dressing C/D/I    Motor:  R: EHL/FHL/TA/G/S intact 5/5   L: EHL/FHL/TA/G/S intact 5/5   Sensation: Intact to light touch bilaterally   Compartments soft compressible  Distal pulses present     A/P   EAMON WELLS 81y Female is now s/p  revision L4-5 PSIF POD #6. Pain well controlled denies any cp/sob/n/v/ha at this time.  -Pain control  -DVT ppx T.Xarelto   -IVF  -PO Diet  -PT/OT  -WBAT, OOB  -Dopplers negative  -VQ scan intermediate   patient was seen by me on rounds and I agree with the above and discussed with above, Dr. Villegas

## 2017-08-16 NOTE — PROGRESS NOTE ADULT - PROBLEM SELECTOR PLAN 8
- teds/venodynes per orthopedic protocol
- teds/venodynes per orthopedic protocol
-c/w teds/valreie per orthopedic protocol

## 2017-08-16 NOTE — PROGRESS NOTE ADULT - SUBJECTIVE AND OBJECTIVE BOX
Patient seen and examined. Sating 91 on room air. No acute events overnight. Pain well controlled. Walked with PT in hallway. denies numbness/tingling/paresthesias/weakness. Denies headaches. Denies fevers/chills. No other complaints at this time.    HEALTH ISSUES - PROBLEM Dx:  Hypoxemia: Hypoxemia  Need for prophylactic measure: Need for prophylactic measure  Post-procedural fever: Post-procedural fever  Status post lumbar spine operation: Status post lumbar spine operation  GERD without esophagitis: GERD without esophagitis  Borderline diabetes: Borderline diabetes  Other hyperlipidemia: Other hyperlipidemia  Aortic valve stenosis, unspecified etiology: Aortic valve stenosis, unspecified etiology  Other intervertebral disc displacement, lumbar region: Other intervertebral disc displacement, lumbar region          MEDICATIONS  (STANDING):  docusate sodium 100 milliGRAM(s) Oral three times a day  senna 2 Tablet(s) Oral at bedtime  simvastatin 20 milliGRAM(s) Oral at bedtime  metoprolol succinate ER 25 milliGRAM(s) Oral daily  pantoprazole    Tablet 40 milliGRAM(s) Oral before breakfast  gabapentin 600 milliGRAM(s) Oral three times a day  traMADol 50 milliGRAM(s) Oral every 8 hours  rivaroxaban 15 milliGRAM(s) Oral two times a day      Allergies    contrast media (gadolinium-based) (Other)  Keflex (Unknown)  penicillin (Unknown)    Intolerances        PAST MEDICAL & SURGICAL HISTORY:  Former smoker  Ankle fracture: left  Osteoporosis  Aortic stenosis: moderate by ECHO 7/2017  Bleeding disorder: pt states her blood takes a long time to clot  Shingles outbreak: in 2005 2 months after back surgery  Poor historian  Abnormal echocardiogram: pt currently wearing a holter monitor  Acid reflux  HLD (hyperlipidemia)  Borderline diabetes  Radiculopathy  Other intervertebral disc displacement, lumbar region  S/P bilateral cataract extraction: 10 years ago  S/P appendectomy  History of partial gastrectomy: secondary to ulcer  H/O laminectomy: april 2005                          Vital Signs Last 24 Hrs  T(C): 36.7 (08-16-17 @ 05:27), Max: 37.2 (08-15-17 @ 09:38)  T(F): 98 (08-16-17 @ 05:27), Max: 99 (08-15-17 @ 17:39)  HR: 98 (08-16-17 @ 05:27) (72 - 108)  BP: 128/76 (08-16-17 @ 05:27) (111/65 - 136/81)  BP(mean): --  RR: 18 (08-16-17 @ 05:27) (15 - 18)  SpO2: 94% (08-16-17 @ 05:27) (90% - 98%)    Gen: NAD    Spine PE:  Dressing clean dry intact      Motor:                   C5                C6              C7               C8           T1   R            5/5                5/5            5/5             5/5          5/5  L             5/5               5/5             5/5             5/5          5/5                L2             L3             L4               L5            S1  R         5/5           5/5          5/5             5/5           5/5  L          5/5          5/5           5/5             5/5           5/5    Sensory:            C5         C6         C7      C8       T1        (0=absent, 1=impaired, 2=normal, NT=not testable)  R         2            2           2        2         2  L          2            2           2        2         2               L2          L3         L4      L5       S1         (0=absent, 1=impaired, 2=normal, NT=not testable)  R         2            2            2        2        2  L          2            2           2        2         2      A/P: 81y Female POD6 L4-5 laminectomy and fusion, VQ scan intermediate probability for PE  T. Xarelto  Pain control  WBAT/PT/OT/OOB  Brace for comfort  FU Labs  SCDs  Medical co-management appreciated  dispo planning, home

## 2017-08-16 NOTE — PROGRESS NOTE ADULT - PROBLEM SELECTOR PROBLEM 3
Aortic valve stenosis, unspecified etiology
Aortic valve stenosis, unspecified etiology
Post-procedural fever

## 2017-08-16 NOTE — PROGRESS NOTE ADULT - PROBLEM SELECTOR PLAN 7
- c/w protonix
- c/w protonix
- teds/venodynes per orthopedic protocol
c/w protonix
-c/w teds/valerie per orthopedic protocol

## 2017-08-16 NOTE — PROGRESS NOTE ADULT - PROBLEM SELECTOR PROBLEM 4
Other hyperlipidemia
Aortic valve stenosis, unspecified etiology
Other hyperlipidemia

## 2017-08-16 NOTE — PROGRESS NOTE ADULT - PROBLEM SELECTOR PROBLEM 6
GERD without esophagitis
Borderline diabetes
GERD without esophagitis

## 2017-08-16 NOTE — PROGRESS NOTE ADULT - PROBLEM SELECTOR PLAN 2
- resolved; c/w incentive spirometer   - sinus tachy related to pain; very low suspicion for PE
-patient satting 89-93% on room air  -will check a CXR and place on continuous pulse and encourage incentive spirometer  -if patient continues to sat less than 92% on room air will recommend ruling out PE with CTPA -- case discussed with the orthopedic team.
In setting of mild tachycardia despite adequate pain control  - obtain V/Q to r/o PE
In setting of mild tachycardia despite adequate pain control, probable PE given V/Q findings  - started on rivaroxaban  - will need follow up as outpt - PMD Dr. Guerline Gardner  - monitor pulse ox on RA
-will encourage incentive spirometer at this time  -will closely monitor and watch

## 2017-08-16 NOTE — PROGRESS NOTE ADULT - PROBLEM SELECTOR PLAN 4
- c/w zocor
Euvolemic  - monitor volume status  - c/w metoprolol as BP and HR tolerates
Euvolemic  - monitor volume status  - c/w metoprolol as BP and HR tolerates
No signs of CHF  monitoring volume status  c/w metoprolol for arrthymia diagnosed 2 weeks ago.
c/w zocor

## 2017-08-16 NOTE — PROGRESS NOTE ADULT - SUBJECTIVE AND OBJECTIVE BOX
Chief complaint: back pain    SUBJECTIVE / OVERNIGHT EVENTS:  Pt is ambulating independently.  Denies CP, SOB, palpitations, fever, chills.    MEDICATIONS  (STANDING):  docusate sodium 100 milliGRAM(s) Oral three times a day  senna 2 Tablet(s) Oral at bedtime  simvastatin 20 milliGRAM(s) Oral at bedtime  metoprolol succinate ER 25 milliGRAM(s) Oral daily  pantoprazole    Tablet 40 milliGRAM(s) Oral before breakfast  gabapentin 600 milliGRAM(s) Oral three times a day  traMADol 50 milliGRAM(s) Oral every 8 hours  rivaroxaban 15 milliGRAM(s) Oral two times a day    MEDICATIONS  (PRN):  acetaminophen   Tablet 650 milliGRAM(s) Oral every 6 hours PRN For Temp greater than 38 C (100.4 F)  diphenhydrAMINE   Injectable 12.5 milliGRAM(s) IV Push every 4 hours PRN Itching  aluminum hydroxide/magnesium hydroxide/simethicone Suspension 30 milliLiter(s) Oral every 12 hours PRN Indigestion  ondansetron Injectable 4 milliGRAM(s) IV Push every 6 hours PRN Nausea and/or Vomiting  polyethylene glycol 3350 17 Gram(s) Oral daily PRN Constipation  bisacodyl Suppository 10 milliGRAM(s) Rectal at bedtime PRN Constipation  HYDROmorphone   Tablet 2 milliGRAM(s) Oral every 4 hours PRN Moderate Pain (4 - 6)    Vital Signs Last 24 Hrs  T(F): 98.3 , Max: 99   HR: 99  (72 - 101)  BP: 128/75 (16 Aug 2017 09:56) (111/65 - 136/81)  RR: 16   SpO2: 98% on 2L; 94% on RA with exertion     PHYSICAL EXAM:  GENERAL: NAD, well-developed  HEENT:  Atraumatic, Normocephalic; conjunctiva and sclera clear; oral mucosa moist; neck supple   CHEST/LUNG: Clear to auscultation bilaterally; No wheezes or rales  HEART: tachycardia, regular rhythm, systolic murmur RUSB  ABDOMEN: Soft, Nontender, Nondistended; Bowel sounds present  EXTREMITIES:  2+ Peripheral Pulses, No clubbing, cyanosis, or edema  PSYCH: AAOx3  BACK/SKIN: incision dressing c/d/i    LABS:                        12.1   7.45  )-----------( 208                   37.1       140  |  99  |  12  ----------------------------<  122  4.2   |  30  |  0.67    Ca    8.8             RADIOLOGY & ADDITIONAL TESTS:    EXAM:  NM PULM VENTILATION PERFUS IMG      PROCEDURE DATE:  Aug 15 2017       INTERPRETATION:  RADIOPHARMACEUTICAL: 1.0 mCi Tc-99m-DTPA via inhalation;   5.8 mCi Tc-99m-MAA, I.V.    CLINICAL INFORMATION: 81 year old female with hypoxia status post spinal   surgery; referred to evaluate for pulmonary embolism.    TECHNIQUE:  Ventilation and perfusion images of the lungs were obtained   following administration of Tc-99m-DTPA and Tc-99m-MAA. Images were   obtained in the anterior, posterior, both lateral, and all 4 oblique   projections. The study was interpreted in conjunction with a chest   radiograph of 8/15/2017.    COMPARISON: No previous lung scans were available for comparison.    FINDINGS: There is a moderate-sized mismatched ventilation/perfusion   defect in the apical segment of the right upper lobe and another moderate   sized mismatched ventilation/perfusion defect in the posterior segment of   the right upper lobe with no corresponding chest x-ray abnormalities.   There is a matched ventilation/perfusion defect in the anteromedial basal   segment of the left lower lobe with no corresponding chest x-ray   abnormality. There is heterogeneous distribution of radiopharmaceutical   in the remainder of both lungs on the ventilation and perfusion images.    IMPRESSION: Abnormal ventilation/perfusion lung scan. Intermediate   probability of pulmonary embolus.    Dr. Harden was informed of these results by by Dr. AUDRA Richards via   telephone with read back at about 2:15 PM on 8/15/2017.      SUBHA RICHARDS M.D., CHIEF OF NUCLEAR MEDICINE  This document has been electronically signed. Aug 15 2017  2:17PM          Care Discussed with Consultants/Other Providers: Orthopedics

## 2017-08-16 NOTE — PROGRESS NOTE ADULT - PROBLEM SELECTOR PROBLEM 1
Status post lumbar spine operation

## 2017-08-19 LAB — SURGICAL PATHOLOGY STUDY: SIGNIFICANT CHANGE UP

## 2017-08-28 ENCOUNTER — APPOINTMENT (OUTPATIENT)
Dept: ORTHOPEDIC SURGERY | Facility: CLINIC | Age: 82
End: 2017-08-28
Payer: MEDICARE

## 2017-08-28 VITALS — BODY MASS INDEX: 22.07 KG/M2 | WEIGHT: 113 LBS

## 2017-08-28 PROCEDURE — 99024 POSTOP FOLLOW-UP VISIT: CPT

## 2017-08-28 PROCEDURE — 72100 X-RAY EXAM L-S SPINE 2/3 VWS: CPT

## 2017-09-06 RX ORDER — RIVAROXABAN 15 MG-20MG
1 KIT ORAL
Qty: 30 | Refills: 1 | OUTPATIENT
Start: 2017-09-06 | End: 2017-11-04

## 2017-10-02 ENCOUNTER — APPOINTMENT (OUTPATIENT)
Dept: ORTHOPEDIC SURGERY | Facility: CLINIC | Age: 82
End: 2017-10-02
Payer: MEDICARE

## 2017-10-02 DIAGNOSIS — M54.5 LOW BACK PAIN: ICD-10-CM

## 2017-10-02 PROCEDURE — 72100 X-RAY EXAM L-S SPINE 2/3 VWS: CPT

## 2017-10-02 PROCEDURE — 99024 POSTOP FOLLOW-UP VISIT: CPT

## 2017-10-02 RX ORDER — TRAMADOL HYDROCHLORIDE 50 MG/1
50 TABLET, COATED ORAL
Qty: 40 | Refills: 0 | Status: ACTIVE | COMMUNITY
Start: 2017-10-02 | End: 1900-01-01

## 2017-10-09 ENCOUNTER — LABORATORY RESULT (OUTPATIENT)
Age: 82
End: 2017-10-09

## 2017-10-09 ENCOUNTER — APPOINTMENT (OUTPATIENT)
Dept: PULMONOLOGY | Facility: CLINIC | Age: 82
End: 2017-10-09
Payer: MEDICARE

## 2017-10-09 ENCOUNTER — MED ADMIN CHARGE (OUTPATIENT)
Age: 82
End: 2017-10-09

## 2017-10-09 VITALS
HEIGHT: 60 IN | HEART RATE: 72 BPM | DIASTOLIC BLOOD PRESSURE: 72 MMHG | SYSTOLIC BLOOD PRESSURE: 140 MMHG | RESPIRATION RATE: 16 BRPM | OXYGEN SATURATION: 97 % | TEMPERATURE: 96.5 F | BODY MASS INDEX: 21.6 KG/M2 | WEIGHT: 110 LBS

## 2017-10-09 PROCEDURE — G0008: CPT

## 2017-10-09 PROCEDURE — 99215 OFFICE O/P EST HI 40 MIN: CPT | Mod: 25

## 2017-10-09 PROCEDURE — 90686 IIV4 VACC NO PRSV 0.5 ML IM: CPT

## 2017-10-09 PROCEDURE — 36415 COLL VENOUS BLD VENIPUNCTURE: CPT

## 2017-10-10 LAB
25(OH)D3 SERPL-MCNC: 33.3 NG/ML
ALBUMIN SERPL ELPH-MCNC: 3.9 G/DL
ALP BLD-CCNC: 69 U/L
ALT SERPL-CCNC: 14 U/L
ANION GAP SERPL CALC-SCNC: 13 MMOL/L
AST SERPL-CCNC: 24 U/L
BASOPHILS # BLD AUTO: 0.02 K/UL
BASOPHILS NFR BLD AUTO: 0.2 %
BILIRUB SERPL-MCNC: 0.4 MG/DL
BUN SERPL-MCNC: 18 MG/DL
CALCIUM SERPL-MCNC: 9.5 MG/DL
CALCIUM SERPL-MCNC: 9.5 MG/DL
CANCER AG125 SERPL-ACNC: 15 U/ML
CCP AB SER IA-ACNC: 10 UNITS
CEA SERPL-MCNC: 2.1 NG/ML
CHLORIDE SERPL-SCNC: 104 MMOL/L
CO2 SERPL-SCNC: 26 MMOL/L
CREAT SERPL-MCNC: 0.86 MG/DL
CRP SERPL-MCNC: 0.3 MG/DL
ENA SCL70 IGG SER IA-ACNC: <0.2 AL
ENA SS-A AB SER IA-ACNC: 0.2 AL
ENA SS-B AB SER IA-ACNC: <0.2 AL
EOSINOPHIL # BLD AUTO: 0.21 K/UL
EOSINOPHIL NFR BLD AUTO: 2.2 %
GLUCOSE SERPL-MCNC: 109 MG/DL
HCT VFR BLD CALC: 40.9 %
HGB BLD-MCNC: 12.7 G/DL
IMM GRANULOCYTES NFR BLD AUTO: 0.1 %
LYMPHOCYTES # BLD AUTO: 1.72 K/UL
LYMPHOCYTES NFR BLD AUTO: 18.3 %
MAN DIFF?: NORMAL
MCHC RBC-ENTMCNC: 28.2 PG
MCHC RBC-ENTMCNC: 31.1 GM/DL
MCV RBC AUTO: 90.9 FL
MONOCYTES # BLD AUTO: 0.76 K/UL
MONOCYTES NFR BLD AUTO: 8.1 %
MPO AB + PR3 PNL SER: NORMAL
NEUTROPHILS # BLD AUTO: 6.7 K/UL
NEUTROPHILS NFR BLD AUTO: 71.1 %
PARATHYROID HORMONE INTACT: 74 PG/ML
PHOSPHATE SERPL-MCNC: 3.8 MG/DL
PLATELET # BLD AUTO: 262 K/UL
POTASSIUM SERPL-SCNC: 5.4 MMOL/L
PROT SERPL-MCNC: 7.5 G/DL
RBC # BLD: 4.5 M/UL
RBC # FLD: 14 %
RF+CCP IGG SER-IMP: NEGATIVE
RHEUMATOID FACT SER QL: <7 IU/ML
SODIUM SERPL-SCNC: 143 MMOL/L
TSH SERPL-ACNC: 4.21 UIU/ML
VIT B12 SERPL-MCNC: 854 PG/ML
WBC # FLD AUTO: 9.42 K/UL

## 2017-10-11 LAB
A1AT SERPL-MCNC: 192 MG/DL
CARDIOLIPIN AB SER IA-ACNC: NEGATIVE

## 2017-10-12 LAB — ANACR T: NEGATIVE

## 2017-11-03 ENCOUNTER — APPOINTMENT (OUTPATIENT)
Dept: ORTHOPEDIC SURGERY | Facility: CLINIC | Age: 82
End: 2017-11-03
Payer: MEDICARE

## 2017-11-03 PROCEDURE — 99024 POSTOP FOLLOW-UP VISIT: CPT

## 2017-11-09 ENCOUNTER — OUTPATIENT (OUTPATIENT)
Dept: OUTPATIENT SERVICES | Facility: HOSPITAL | Age: 82
LOS: 1 days | End: 2017-11-09
Payer: COMMERCIAL

## 2017-11-09 ENCOUNTER — APPOINTMENT (OUTPATIENT)
Dept: PULMONOLOGY | Facility: CLINIC | Age: 82
End: 2017-11-09
Payer: MEDICARE

## 2017-11-09 ENCOUNTER — APPOINTMENT (OUTPATIENT)
Dept: CT IMAGING | Facility: IMAGING CENTER | Age: 82
End: 2017-11-09
Payer: MEDICARE

## 2017-11-09 VITALS
BODY MASS INDEX: 20.81 KG/M2 | OXYGEN SATURATION: 95 % | RESPIRATION RATE: 15 BRPM | HEART RATE: 68 BPM | HEIGHT: 60 IN | TEMPERATURE: 97.6 F | WEIGHT: 106 LBS | DIASTOLIC BLOOD PRESSURE: 70 MMHG | SYSTOLIC BLOOD PRESSURE: 122 MMHG

## 2017-11-09 VITALS — BODY MASS INDEX: 20.62 KG/M2 | HEIGHT: 60 IN | WEIGHT: 105 LBS

## 2017-11-09 DIAGNOSIS — Z90.3 ACQUIRED ABSENCE OF STOMACH [PART OF]: Chronic | ICD-10-CM

## 2017-11-09 DIAGNOSIS — R91.1 SOLITARY PULMONARY NODULE: ICD-10-CM

## 2017-11-09 DIAGNOSIS — Z98.41 CATARACT EXTRACTION STATUS, RIGHT EYE: Chronic | ICD-10-CM

## 2017-11-09 DIAGNOSIS — Z90.49 ACQUIRED ABSENCE OF OTHER SPECIFIED PARTS OF DIGESTIVE TRACT: Chronic | ICD-10-CM

## 2017-11-09 DIAGNOSIS — Z98.890 OTHER SPECIFIED POSTPROCEDURAL STATES: Chronic | ICD-10-CM

## 2017-11-09 PROCEDURE — 94060 EVALUATION OF WHEEZING: CPT

## 2017-11-09 PROCEDURE — 71250 CT THORAX DX C-: CPT

## 2017-11-09 PROCEDURE — 94729 DIFFUSING CAPACITY: CPT

## 2017-11-09 PROCEDURE — ZZZZZ: CPT

## 2017-11-09 PROCEDURE — 99215 OFFICE O/P EST HI 40 MIN: CPT | Mod: 25

## 2017-11-09 PROCEDURE — 71250 CT THORAX DX C-: CPT | Mod: 26

## 2017-11-09 PROCEDURE — 94726 PLETHYSMOGRAPHY LUNG VOLUMES: CPT

## 2017-12-04 ENCOUNTER — APPOINTMENT (OUTPATIENT)
Dept: ORTHOPEDIC SURGERY | Facility: CLINIC | Age: 82
End: 2017-12-04
Payer: MEDICARE

## 2017-12-04 VITALS
HEART RATE: 73 BPM | HEIGHT: 60 IN | SYSTOLIC BLOOD PRESSURE: 123 MMHG | BODY MASS INDEX: 20.81 KG/M2 | WEIGHT: 106 LBS | DIASTOLIC BLOOD PRESSURE: 77 MMHG

## 2017-12-04 DIAGNOSIS — M48.07 SPINAL STENOSIS, LUMBOSACRAL REGION: ICD-10-CM

## 2017-12-04 PROCEDURE — 99214 OFFICE O/P EST MOD 30 MIN: CPT

## 2018-02-06 ENCOUNTER — APPOINTMENT (OUTPATIENT)
Dept: PULMONOLOGY | Facility: CLINIC | Age: 83
End: 2018-02-06
Payer: MEDICARE

## 2018-02-06 VITALS
HEART RATE: 54 BPM | HEIGHT: 60 IN | RESPIRATION RATE: 18 BRPM | SYSTOLIC BLOOD PRESSURE: 110 MMHG | BODY MASS INDEX: 20.81 KG/M2 | DIASTOLIC BLOOD PRESSURE: 60 MMHG | TEMPERATURE: 97.9 F | OXYGEN SATURATION: 96 % | WEIGHT: 106 LBS

## 2018-02-06 PROCEDURE — 99215 OFFICE O/P EST HI 40 MIN: CPT

## 2018-02-06 RX ORDER — RIVAROXABAN 20 MG/1
20 TABLET, FILM COATED ORAL
Refills: 0 | Status: DISCONTINUED | COMMUNITY
End: 2018-02-06

## 2018-02-06 RX ORDER — RIVAROXABAN 15 MG/1
15 TABLET, FILM COATED ORAL
Qty: 42 | Refills: 0 | Status: DISCONTINUED | COMMUNITY
Start: 2017-08-16 | End: 2018-02-06

## 2018-02-24 ENCOUNTER — OUTPATIENT (OUTPATIENT)
Dept: OUTPATIENT SERVICES | Facility: HOSPITAL | Age: 83
LOS: 1 days | End: 2018-02-24
Payer: COMMERCIAL

## 2018-02-24 ENCOUNTER — APPOINTMENT (OUTPATIENT)
Dept: CT IMAGING | Facility: IMAGING CENTER | Age: 83
End: 2018-02-24
Payer: MEDICARE

## 2018-02-24 DIAGNOSIS — Z98.41 CATARACT EXTRACTION STATUS, RIGHT EYE: Chronic | ICD-10-CM

## 2018-02-24 DIAGNOSIS — Z00.8 ENCOUNTER FOR OTHER GENERAL EXAMINATION: ICD-10-CM

## 2018-02-24 DIAGNOSIS — Z90.49 ACQUIRED ABSENCE OF OTHER SPECIFIED PARTS OF DIGESTIVE TRACT: Chronic | ICD-10-CM

## 2018-02-24 DIAGNOSIS — Z90.3 ACQUIRED ABSENCE OF STOMACH [PART OF]: Chronic | ICD-10-CM

## 2018-02-24 DIAGNOSIS — Z98.890 OTHER SPECIFIED POSTPROCEDURAL STATES: Chronic | ICD-10-CM

## 2018-02-24 PROCEDURE — 71250 CT THORAX DX C-: CPT

## 2018-02-24 PROCEDURE — 71250 CT THORAX DX C-: CPT | Mod: 26

## 2018-02-26 ENCOUNTER — OUTPATIENT (OUTPATIENT)
Dept: OUTPATIENT SERVICES | Facility: HOSPITAL | Age: 83
LOS: 1 days | End: 2018-02-26

## 2018-02-26 DIAGNOSIS — Z98.890 OTHER SPECIFIED POSTPROCEDURAL STATES: Chronic | ICD-10-CM

## 2018-02-26 DIAGNOSIS — Z90.49 ACQUIRED ABSENCE OF OTHER SPECIFIED PARTS OF DIGESTIVE TRACT: Chronic | ICD-10-CM

## 2018-02-26 DIAGNOSIS — R06.02 SHORTNESS OF BREATH: ICD-10-CM

## 2018-02-26 DIAGNOSIS — Z90.3 ACQUIRED ABSENCE OF STOMACH [PART OF]: Chronic | ICD-10-CM

## 2018-02-26 DIAGNOSIS — Z98.41 CATARACT EXTRACTION STATUS, RIGHT EYE: Chronic | ICD-10-CM

## 2018-03-01 ENCOUNTER — APPOINTMENT (OUTPATIENT)
Dept: RADIOLOGY | Facility: HOSPITAL | Age: 83
End: 2018-03-01
Payer: MEDICARE

## 2018-03-01 ENCOUNTER — APPOINTMENT (OUTPATIENT)
Dept: NUCLEAR MEDICINE | Facility: HOSPITAL | Age: 83
End: 2018-03-01
Payer: MEDICARE

## 2018-03-01 ENCOUNTER — OUTPATIENT (OUTPATIENT)
Dept: OUTPATIENT SERVICES | Facility: HOSPITAL | Age: 83
LOS: 1 days | End: 2018-03-01
Payer: MEDICARE

## 2018-03-01 ENCOUNTER — OUTPATIENT (OUTPATIENT)
Dept: OUTPATIENT SERVICES | Facility: HOSPITAL | Age: 83
LOS: 1 days | End: 2018-03-01

## 2018-03-01 DIAGNOSIS — I26.99 OTHER PULMONARY EMBOLISM WITHOUT ACUTE COR PULMONALE: ICD-10-CM

## 2018-03-01 DIAGNOSIS — Z98.41 CATARACT EXTRACTION STATUS, RIGHT EYE: Chronic | ICD-10-CM

## 2018-03-01 DIAGNOSIS — Z98.890 OTHER SPECIFIED POSTPROCEDURAL STATES: Chronic | ICD-10-CM

## 2018-03-01 DIAGNOSIS — Z90.3 ACQUIRED ABSENCE OF STOMACH [PART OF]: Chronic | ICD-10-CM

## 2018-03-01 DIAGNOSIS — Z90.49 ACQUIRED ABSENCE OF OTHER SPECIFIED PARTS OF DIGESTIVE TRACT: Chronic | ICD-10-CM

## 2018-03-01 PROCEDURE — 78582 LUNG VENTILAT&PERFUS IMAGING: CPT | Mod: 26,GC

## 2018-03-01 PROCEDURE — 71046 X-RAY EXAM CHEST 2 VIEWS: CPT | Mod: 26

## 2018-04-24 ENCOUNTER — APPOINTMENT (OUTPATIENT)
Dept: PULMONOLOGY | Facility: CLINIC | Age: 83
End: 2018-04-24
Payer: MEDICARE

## 2018-04-24 VITALS
OXYGEN SATURATION: 96 % | HEIGHT: 60 IN | HEART RATE: 69 BPM | WEIGHT: 108 LBS | SYSTOLIC BLOOD PRESSURE: 120 MMHG | DIASTOLIC BLOOD PRESSURE: 80 MMHG | RESPIRATION RATE: 18 BRPM | TEMPERATURE: 96.9 F | BODY MASS INDEX: 21.2 KG/M2

## 2018-04-24 PROCEDURE — 99215 OFFICE O/P EST HI 40 MIN: CPT

## 2018-09-18 ENCOUNTER — MED ADMIN CHARGE (OUTPATIENT)
Age: 83
End: 2018-09-18

## 2018-09-18 ENCOUNTER — APPOINTMENT (OUTPATIENT)
Dept: PULMONOLOGY | Facility: CLINIC | Age: 83
End: 2018-09-18
Payer: MEDICARE

## 2018-09-18 VITALS
BODY MASS INDEX: 21.99 KG/M2 | SYSTOLIC BLOOD PRESSURE: 120 MMHG | TEMPERATURE: 98.2 F | RESPIRATION RATE: 16 BRPM | DIASTOLIC BLOOD PRESSURE: 80 MMHG | WEIGHT: 112 LBS | HEART RATE: 78 BPM | HEIGHT: 60 IN

## 2018-09-18 PROBLEM — B02.9 ZOSTER WITHOUT COMPLICATIONS: Chronic | Status: ACTIVE | Noted: 2017-07-26

## 2018-09-18 PROBLEM — S82.899A OTHER FRACTURE OF UNSPECIFIED LOWER LEG, INITIAL ENCOUNTER FOR CLOSED FRACTURE: Chronic | Status: ACTIVE | Noted: 2017-07-26

## 2018-09-18 PROBLEM — K21.9 GASTRO-ESOPHAGEAL REFLUX DISEASE WITHOUT ESOPHAGITIS: Chronic | Status: ACTIVE | Noted: 2017-07-26

## 2018-09-18 PROBLEM — E78.5 HYPERLIPIDEMIA, UNSPECIFIED: Chronic | Status: ACTIVE | Noted: 2017-07-26

## 2018-09-18 PROBLEM — M54.10 RADICULOPATHY, SITE UNSPECIFIED: Chronic | Status: ACTIVE | Noted: 2017-07-26

## 2018-09-18 PROBLEM — M81.0 AGE-RELATED OSTEOPOROSIS WITHOUT CURRENT PATHOLOGICAL FRACTURE: Chronic | Status: ACTIVE | Noted: 2017-07-26

## 2018-09-18 PROBLEM — R73.03 PREDIABETES: Chronic | Status: ACTIVE | Noted: 2017-07-26

## 2018-09-18 PROBLEM — M51.26 OTHER INTERVERTEBRAL DISC DISPLACEMENT, LUMBAR REGION: Chronic | Status: ACTIVE | Noted: 2017-07-26

## 2018-09-18 PROBLEM — Z78.9 OTHER SPECIFIED HEALTH STATUS: Chronic | Status: ACTIVE | Noted: 2017-07-26

## 2018-09-18 PROBLEM — Z87.891 PERSONAL HISTORY OF NICOTINE DEPENDENCE: Chronic | Status: ACTIVE | Noted: 2017-07-26

## 2018-09-18 PROCEDURE — 90686 IIV4 VACC NO PRSV 0.5 ML IM: CPT

## 2018-09-18 PROCEDURE — 99215 OFFICE O/P EST HI 40 MIN: CPT | Mod: 25

## 2018-09-18 PROCEDURE — G0008: CPT

## 2018-09-18 RX ORDER — FLUTICASONE FUROATE AND VILANTEROL TRIFENATATE 100; 25 UG/1; UG/1
100-25 POWDER RESPIRATORY (INHALATION) DAILY
Qty: 1 | Refills: 5 | Status: DISCONTINUED | COMMUNITY
Start: 2017-11-09 | End: 2018-09-18

## 2018-09-18 RX ORDER — APIXABAN 5 MG/1
5 TABLET, FILM COATED ORAL
Qty: 180 | Refills: 1 | Status: DISCONTINUED | COMMUNITY
Start: 2018-02-06 | End: 2018-09-18

## 2019-01-08 ENCOUNTER — APPOINTMENT (OUTPATIENT)
Dept: PULMONOLOGY | Facility: CLINIC | Age: 84
End: 2019-01-08
Payer: MEDICARE

## 2019-01-08 VITALS
HEART RATE: 71 BPM | TEMPERATURE: 97.4 F | WEIGHT: 111 LBS | RESPIRATION RATE: 16 BRPM | HEIGHT: 60 IN | BODY MASS INDEX: 21.79 KG/M2 | DIASTOLIC BLOOD PRESSURE: 80 MMHG | SYSTOLIC BLOOD PRESSURE: 128 MMHG | OXYGEN SATURATION: 94 %

## 2019-01-08 PROCEDURE — 94726 PLETHYSMOGRAPHY LUNG VOLUMES: CPT

## 2019-01-08 PROCEDURE — 94729 DIFFUSING CAPACITY: CPT

## 2019-01-08 PROCEDURE — ZZZZZ: CPT

## 2019-01-08 PROCEDURE — 99215 OFFICE O/P EST HI 40 MIN: CPT | Mod: 25

## 2019-01-08 PROCEDURE — 94060 EVALUATION OF WHEEZING: CPT

## 2019-01-08 NOTE — DISCUSSION/SUMMARY
[FreeTextEntry1] : ------------Assessment plan---------- patient has been referred here for further opinion regarding pulmonary problem-----------------82-year-old lady status post lumbar spine surgery august 2017---postop period started on XARELTO AS SHE HAD INTERMEDIATE  probability VQ scan----[ allergic to dyes ] ---ON ECHO  HAS   MOD AS AND MOD  MS ON ECHO \par \par 1   H/O  PE --Finished > 6months of treatment. Negative VQ scan now. Advised patient to stop elliquis. STOPED  JUNE 2018 \par 2. CT scan of chest w/o contrast did now show any nodules. \par 3. PFT with + bronchodilator response to FEV1 and obstructive pattern. Advised on use of breo ellipta. Demonstration of technique performed in the office today with teach back. Advised to rinse mouth after use. Education material provided.---PT STOPPED AS SHE FELT IT DID  NOT HELP HER\par 4.--VALVULAR HEART DISEASE---AORTIC STENOSIS---  Echo with mild elevated RVSP without an BROCK. WIll follow clinically. \par 5-RECEIVED FLU VACCINE SEPT 2018\par 6-follow up oct. 2019\par \par Thanks for allowing  me to participate  in the care of this patient.  Patient at this time  will follow  the above mentioned recommendations and return back for follow up visit. If you have any questions  I can be reached  at [phone] or [phone].

## 2019-01-08 NOTE — REVIEW OF SYSTEMS
[Dyspnea] : dyspnea [As Noted in HPI] : as noted in HPI [Negative] : Sleep Disorder [Fever] : no fever [Chills] : no chills [Dry Eyes] : no dryness of the eyes [Eye Irritation] : no ~T irritation of the eyes [Cough] : no cough [Sputum] : not coughing up ~M sputum [PND] : no PND [Orthopnea] : no orthopnea [Hay Fever] : no hay fever [Itchy Eyes] : no itching of ~T the eyes [Heartburn] : no heartburn [Reflux] : no reflux [Trauma] : no ~T physical trauma [Fracture] : no fracture [Headache] : no headache [Thyroid Problem] : no thyroid problem [Difficulty Initiating Sleep] : no difficulty falling asleep

## 2019-01-08 NOTE — HISTORY OF PRESENT ILLNESS
[FreeTextEntry1] : New PCP: Dr. Zulma Renee \par ----This letter  is regarding your patient  who  attended pulmonary out patient office today.  I have reviewed  patient's  past history, social history, family history and medication list. I also  reviewed nurse practitioners/ and fellows  notes and assessment and agree with it.  –The patient was originally referred by --------82-year-old lady status post lumbar spine surgery august 2017---postop period started on XARELTO AS SHE HAD INTERMEDIATE  probability VQ scan----[ allergic to dyes ] -----Now repeat VQ scan showing resolution of a mismatched defect.--------ALSO HAS  AORTIC STENOSIS --------------------No history of , fever, chills , rigors, chestpain, or hemoptysis.. No h/o significant weight loss in last few months. No history of liver dysfunction , collagen vascular disorder or chronic thromboembolic disease. I would classify her dyspnea as WHO  FUNCTIONAL CLASS II--------\par \par ----Echo  date------DR OJEDA, MODERATE   AS , MOD MITRAL REGURGITATAION,PASP 39 \par ---ECHO    6/2018--EF 65, MOD AS, MOD MS, PASP 33\par ----Pft date--------11/9/17 obstructive defect with bronchodilator response, decreased dlco (61)\par ---PFT 1/2019  spirometry and lung volumes are normal ---decreased  dlco.\par CT w/o contrast: No pulmonary nodules. \par \par CT 2/2018--R L/L MILD bronchiectasis, mild emphysema and small hiatal hernia stable solid 4 mm nodule right lung base\par -----Cath date------------NOT DOEN\par vq scan 8/2017 ---IMPRESSION: Abnormal ventilation/perfusion lung scan. Intermediate \par probability of pulmonary embolus.\par -vq scan----march 2018----resolution of  previous right upper lobe perfusion defect\par ---APRIL 2018----  FEELS BETTER ON ELIQUIS\par ---SEPT----NO NEW COMPLAINTS, STOPPED BRONCHODILATORS---HAS  AORTIC STENOSIS  SECONDARY PULM HTN ON ECHO \par ---jan 2019--patient denies complaints.  Exercise tolerance is preserved able to ambulate multiple blocks. Has a one pillow orthopnea at night.  On ASA/BB/Statin.

## 2019-01-08 NOTE — PHYSICAL EXAM
[Normal Appearance] : normal appearance [Normal Conjunctiva] : the conjunctiva exhibited no abnormalities [II] : II [Neck Appearance] : the appearance of the neck was normal [Heart Sounds] : normal S1 and S2 [Respiration, Rhythm And Depth] : normal respiratory rhythm and effort [Exaggerated Use Of Accessory Muscles For Inspiration] : no accessory muscle use [Abdomen Soft] : soft [Abnormal Walk] : normal gait [Cyanosis, Localized] : no localized cyanosis [Non-Pitting] : non-pitting [Skin Color & Pigmentation] : normal skin color and pigmentation [Cranial Nerves] : cranial nerves 2-12 were intact [Oriented To Time, Place, And Person] : oriented to person, place, and time [FreeTextEntry1] : no spinetenderness

## 2019-01-08 NOTE — CONSULT LETTER
[Dear  ___] : Dear  [unfilled], [Courtesy Letter:] : I had the pleasure of seeing your patient, [unfilled], in my office today. [Sincerely,] : Sincerely, [Jon Calhoun MD, FCCP] : Jon Calhoun MD, FCCP [Director, Pulmonary Hypertension Program] : Director, Pulmonary Hypertension Program [[facility]] : [facility] [Division of Pulmonary, Critical Care and Sleep Medicine] : Division of Pulmonary, Critical Care and Sleep Medicine [Associate Professor of Medicine] : Associate Professor of Medicine

## 2019-04-16 ENCOUNTER — NON-APPOINTMENT (OUTPATIENT)
Age: 84
End: 2019-04-16

## 2019-04-16 ENCOUNTER — APPOINTMENT (OUTPATIENT)
Dept: CARDIOLOGY | Facility: CLINIC | Age: 84
End: 2019-04-16
Payer: MEDICARE

## 2019-04-16 VITALS — HEART RATE: 57 BPM | DIASTOLIC BLOOD PRESSURE: 74 MMHG | SYSTOLIC BLOOD PRESSURE: 143 MMHG | OXYGEN SATURATION: 94 %

## 2019-04-16 PROCEDURE — 99205 OFFICE O/P NEW HI 60 MIN: CPT

## 2019-04-16 PROCEDURE — 93000 ELECTROCARDIOGRAM COMPLETE: CPT

## 2019-04-16 RX ORDER — DICLOFENAC SODIUM 75 MG/1
75 TABLET, DELAYED RELEASE ORAL
Qty: 90 | Refills: 1 | Status: DISCONTINUED | COMMUNITY
Start: 2017-06-07 | End: 2019-04-16

## 2019-04-16 RX ORDER — ASPIRIN ENTERIC COATED TABLETS 81 MG 81 MG/1
81 TABLET, DELAYED RELEASE ORAL
Refills: 6 | Status: ACTIVE | COMMUNITY
Start: 2019-04-16

## 2019-04-16 NOTE — PHYSICAL EXAM
[General Appearance - Well Developed] : well developed [Normal Appearance] : normal appearance [General Appearance - Well Nourished] : well nourished [Well Groomed] : well groomed [No Deformities] : no deformities [General Appearance - In No Acute Distress] : no acute distress [Eyelids - No Xanthelasma] : the eyelids demonstrated no xanthelasmas [Normal Conjunctiva] : the conjunctiva exhibited no abnormalities [No Oral Pallor] : no oral pallor [Normal Oral Mucosa] : normal oral mucosa [Normal Jugular Venous A Waves Present] : normal jugular venous A waves present [No Oral Cyanosis] : no oral cyanosis [No Jugular Venous Calle A Waves] : no jugular venous calle A waves [Normal Jugular Venous V Waves Present] : normal jugular venous V waves present [Heart Rate And Rhythm] : heart rate and rhythm were normal [Heart Sounds] : normal S1 and S2 [Respiration, Rhythm And Depth] : normal respiratory rhythm and effort [Exaggerated Use Of Accessory Muscles For Inspiration] : no accessory muscle use [Auscultation Breath Sounds / Voice Sounds] : lungs were clear to auscultation bilaterally [Abdomen Soft] : soft [Abdomen Tenderness] : non-tender [Gait - Sufficient For Exercise Testing] : the gait was sufficient for exercise testing [Abdomen Mass (___ Cm)] : no abdominal mass palpated [Abnormal Walk] : normal gait [Nail Clubbing] : no clubbing of the fingernails [Cyanosis, Localized] : no localized cyanosis [Petechial Hemorrhages (___cm)] : no petechial hemorrhages [Skin Color & Pigmentation] : normal skin color and pigmentation [No Venous Stasis] : no venous stasis [] : no rash [Skin Lesions] : no skin lesions [No Skin Ulcers] : no skin ulcer [Oriented To Time, Place, And Person] : oriented to person, place, and time [No Xanthoma] : no  xanthoma was observed [Affect] : the affect was normal [Mood] : the mood was normal [No Anxiety] : not feeling anxious [FreeTextEntry1] : III/VI JOSE

## 2019-04-16 NOTE — HISTORY OF PRESENT ILLNESS
[FreeTextEntry1] : Ms. Garcia is an 83 year-old woman with known back issues s/p 2 back surgeries with the most recent leading to PE requiring anticoagulation of the perior post-OP, and aortic stenosis.\par She is unable to walk more than 1 block and upon climbing stairs she has to stop multiple times to make it all the wy up.

## 2019-04-16 NOTE — ASSESSMENT
[FreeTextEntry1] : Ms. Garcia is an 83 year-old woman with known aortic stenosis.\par \par Plan:\par 1. Given the severe AS upon exam, will request for repeat TTE and TAVR evaluation with R+L heart catheterization along with CTA.\par 2. Patient is at least intermediate risk for TAVR. Patient and huband explained all procedural details and they understand the r/a/b/c/i of the procedure and agree to proceed.\par 3. Old records requested and reviewed with performing physician.\par 4. Primary and secondary prevention of cardiovascular and related conditions discussed at length, including but not limited to diet and lifestyle modification.\par 5. Patient to return to the office in 2-3 months.\par \par Thank you for allowing me to participate in the care of your patient. If you have any questions, please feel free to contact me at [phone] or via email at [email or web address].\par \par Sincerely,\par \par Rhianna Kim MD [facility]

## 2019-04-29 PROBLEM — J44.9 COPD (CHRONIC OBSTRUCTIVE PULMONARY DISEASE): Status: ACTIVE | Noted: 2017-11-09

## 2019-04-29 PROBLEM — I26.99 PULMONARY EMBOLISM: Status: ACTIVE | Noted: 2017-11-09

## 2019-04-30 ENCOUNTER — APPOINTMENT (OUTPATIENT)
Dept: CARDIOTHORACIC SURGERY | Facility: CLINIC | Age: 84
End: 2019-04-30
Payer: MEDICARE

## 2019-04-30 ENCOUNTER — NON-APPOINTMENT (OUTPATIENT)
Age: 84
End: 2019-04-30

## 2019-04-30 VITALS
TEMPERATURE: 98 F | DIASTOLIC BLOOD PRESSURE: 69 MMHG | HEIGHT: 59 IN | WEIGHT: 110 LBS | HEART RATE: 61 BPM | OXYGEN SATURATION: 95 % | SYSTOLIC BLOOD PRESSURE: 112 MMHG | RESPIRATION RATE: 12 BRPM | BODY MASS INDEX: 22.18 KG/M2

## 2019-04-30 DIAGNOSIS — J44.9 CHRONIC OBSTRUCTIVE PULMONARY DISEASE, UNSPECIFIED: ICD-10-CM

## 2019-04-30 DIAGNOSIS — Z91.041 RADIOGRAPHIC DYE ALLERGY STATUS: ICD-10-CM

## 2019-04-30 DIAGNOSIS — I26.99 OTHER PULMONARY EMBOLISM W/OUT ACUTE COR PULMONALE: ICD-10-CM

## 2019-04-30 PROCEDURE — 99205 OFFICE O/P NEW HI 60 MIN: CPT

## 2019-04-30 PROCEDURE — 94010 BREATHING CAPACITY TEST: CPT

## 2019-04-30 PROCEDURE — 93000 ELECTROCARDIOGRAM COMPLETE: CPT

## 2019-04-30 RX ORDER — HYDROMORPHONE HYDROCHLORIDE 2 MG/1
2 TABLET ORAL
Qty: 42 | Refills: 0 | Status: COMPLETED | COMMUNITY
Start: 2017-08-15 | End: 2019-04-30

## 2019-04-30 NOTE — PHYSICAL EXAM
[General Appearance - Alert] : alert [General Appearance - In No Acute Distress] : in no acute distress [Sclera] : the sclera and conjunctiva were normal [PERRL With Normal Accommodation] : pupils were equal in size, round, and reactive to light [Extraocular Movements] : extraocular movements were intact [Outer Ear] : the ears and nose were normal in appearance [Oropharynx] : the oropharynx was normal [Jugular Venous Distention Increased] : there was no jugular-venous distention [Respiration, Rhythm And Depth] : normal respiratory rhythm and effort [II] : a grade 2 [Examination Of The Chest] : the chest was normal in appearance [Edema] : there was no peripheral edema [Veins - Varicosity Changes] : there were no varicosital changes [Breast Appearance] : normal in appearance [Bowel Sounds] : normal bowel sounds [Abdomen Soft] : soft [Cervical Lymph Nodes Enlarged Posterior Bilaterally] : posterior cervical [Cervical Lymph Nodes Enlarged Anterior Bilaterally] : anterior cervical [No CVA Tenderness] : no ~M costovertebral angle tenderness [Involuntary Movements] : no involuntary movements were seen [Skin Color & Pigmentation] : normal skin color and pigmentation [No Focal Deficits] : no focal deficits [Oriented To Time, Place, And Person] : oriented to person, place, and time [Impaired Insight] : insight and judgment were intact

## 2019-04-30 NOTE — REVIEW OF SYSTEMS
[Feeling Tired] : feeling tired [Dizziness] : dizziness [Negative] : Heme/Lymph [Chest Pain] : no chest pain [Palpitations] : no palpitations [Fainting] : no fainting

## 2019-04-30 NOTE — CONSULT LETTER
[Please see my note below.] : Please see my note below. [Consult Closing:] : Thank you very much for allowing me to participate in the care of this patient.  If you have any questions, please do not hesitate to contact me. [Sincerely,] : Sincerely, [Dear  ___] : Dear  [unfilled], [Consult Letter:] : I had the pleasure of evaluating your patient, [unfilled]. [FreeTextEntry2] : Kendall Perdue MD\[address]\[place] [id number] [FreeTextEntry3] : Franklin Gill MD\par \par Cardiovascular & Thoracic Surgery\par Professor\par [facility]\par \par

## 2019-04-30 NOTE — DISCUSSION/SUMMARY
[FreeTextEntry1] : Therese has severe symptomatic aortic stenosis with recent progression of fatigue, exertional dyspnea, and  intermittent dizziness, NYHA Class II.  Her STS is 3.89% putting her in the intermediate risk cohort. We will schedule her cardiac CT and angiogram (with Dr Kim) to complete her TAVR evaluation, after which all imaging will be reviewed and optimal treatment strategy determined.  She will require pre-medication with Prednisone prior to any contrast-based exams.

## 2019-04-30 NOTE — ASSESSMENT
[FreeTextEntry1] : Severe symptomatic AS in a patient with prior lumbar surgery, PE and dye allergy.  Will need CTA and cath with premedication with steroids and then TAVR. Surgery discussed with patient and spouse in detail.

## 2019-04-30 NOTE — REVIEW OF SYSTEMS
[Feeling Fatigued] : feeling fatigued [Shortness Of Breath] : shortness of breath [Dyspnea on exertion] : dyspnea during exertion [see HPI] : see HPI [Dizziness] : dizziness [Numbness (Hypesthesia)] : numbness [Memory Lapses Or Loss] : memory lapses or loss [Negative] : Heme/Lymph [Chest  Pressure] : no chest pressure [Chest Pain] : no chest pain [Lower Ext Edema] : no extremity edema [Abdominal Pain] : no abdominal pain [Nausea] : no nausea [Dysphagia] : no dysphagia

## 2019-04-30 NOTE — HISTORY OF PRESENT ILLNESS
[FreeTextEntry1] : Therese is an 83 year old female referred by Dr. Perdue for initial consultation of severe aortic stenosis. She reports a six month progression of fatigue, exertional dyspnea specifically with stairs, and dizziness without syncope, NYHA class II. Denies chest pain or pressure, PND, orthopnea, or LE edema. Her Past medical history includes COPD followed by Dr. 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 Noted history of Contrast Allergy with "rash and itching" after CT. \par \par STS 3.89%, she meets no frailty criteria putting her in the intermediate risk cohort.

## 2019-04-30 NOTE — DATA REVIEWED
[FreeTextEntry1] : Echocardiogram demonstrated CRISELDA 0.5 cm2, mGr 39 mHg, pGr 63 mmHg, AoV 4.0 m/sec, AI mild

## 2019-04-30 NOTE — PHYSICAL EXAM
[General Appearance - Well Developed] : well developed [Normal Appearance] : normal appearance [General Appearance - Well Nourished] : well nourished [Normal Conjunctiva] : the conjunctiva exhibited no abnormalities [Normal Oral Mucosa] : normal oral mucosa [Normal Jugular Venous A Waves Present] : normal jugular venous A waves present [Normal Jugular Venous V Waves Present] : normal jugular venous V waves present [No Jugular Venous Calle A Waves] : no jugular venous calle A waves [IV] : a grade 4 [III] : a grade 3 [Right Carotid Bruit] : right carotid bruit heard [Left Carotid Bruit] : left carotid bruit heard [No Pitting Edema] : no pitting edema present [Respiration, Rhythm And Depth] : normal respiratory rhythm and effort [Auscultation Breath Sounds / Voice Sounds] : lungs were clear to auscultation bilaterally [Bowel Sounds] : normal bowel sounds [Abdomen Soft] : soft [Abdomen Tenderness] : non-tender [Abnormal Walk] : normal gait [Nail Clubbing] : no clubbing of the fingernails [Skin Turgor] : normal skin turgor [] : no rash [No Venous Stasis] : no venous stasis [Oriented To Time, Place, And Person] : oriented to person, place, and time [Impaired Insight] : insight and judgment were intact [Affect] : the affect was normal [FreeTextEntry1] : "forgetful"

## 2019-04-30 NOTE — HISTORY OF PRESENT ILLNESS
[FreeTextEntry1] : Therese is an 83 year old female with PMH of COPD, hernia, spinal stenosis s/p fusion complicated by pulmonary embolism (AC x 1 year) and aortic stenosis. Her recent echocardiogram demonstrated severe AS, and she was referred to valve clinic for consideration for SMITHA. Today she reports fatigue, denies chest pain, palpitations, dyspnea on exertion, pedal edema dizziness or syncope.

## 2019-05-03 ENCOUNTER — OUTPATIENT (OUTPATIENT)
Dept: OUTPATIENT SERVICES | Facility: HOSPITAL | Age: 84
LOS: 1 days | End: 2019-05-03
Payer: COMMERCIAL

## 2019-05-03 VITALS
WEIGHT: 110.89 LBS | TEMPERATURE: 98 F | DIASTOLIC BLOOD PRESSURE: 71 MMHG | OXYGEN SATURATION: 98 % | RESPIRATION RATE: 17 BRPM | HEIGHT: 59 IN | SYSTOLIC BLOOD PRESSURE: 182 MMHG | HEART RATE: 82 BPM

## 2019-05-03 DIAGNOSIS — Z98.890 OTHER SPECIFIED POSTPROCEDURAL STATES: Chronic | ICD-10-CM

## 2019-05-03 DIAGNOSIS — I35.0 NONRHEUMATIC AORTIC (VALVE) STENOSIS: ICD-10-CM

## 2019-05-03 DIAGNOSIS — Z90.49 ACQUIRED ABSENCE OF OTHER SPECIFIED PARTS OF DIGESTIVE TRACT: Chronic | ICD-10-CM

## 2019-05-03 DIAGNOSIS — Z98.41 CATARACT EXTRACTION STATUS, RIGHT EYE: Chronic | ICD-10-CM

## 2019-05-03 DIAGNOSIS — Z90.3 ACQUIRED ABSENCE OF STOMACH [PART OF]: Chronic | ICD-10-CM

## 2019-05-03 LAB
ALBUMIN SERPL ELPH-MCNC: 4.5 G/DL — SIGNIFICANT CHANGE UP (ref 3.3–5)
ALP SERPL-CCNC: 72 U/L — SIGNIFICANT CHANGE UP (ref 40–120)
ALT FLD-CCNC: 29 U/L — SIGNIFICANT CHANGE UP (ref 10–45)
ANION GAP SERPL CALC-SCNC: 10 MMOL/L — SIGNIFICANT CHANGE UP (ref 5–17)
AST SERPL-CCNC: 24 U/L — SIGNIFICANT CHANGE UP (ref 10–40)
BILIRUB SERPL-MCNC: 0.5 MG/DL — SIGNIFICANT CHANGE UP (ref 0.2–1.2)
BUN SERPL-MCNC: 16 MG/DL — SIGNIFICANT CHANGE UP (ref 7–23)
CALCIUM SERPL-MCNC: 9.7 MG/DL — SIGNIFICANT CHANGE UP (ref 8.4–10.5)
CHLORIDE SERPL-SCNC: 103 MMOL/L — SIGNIFICANT CHANGE UP (ref 96–108)
CO2 SERPL-SCNC: 25 MMOL/L — SIGNIFICANT CHANGE UP (ref 22–31)
CREAT SERPL-MCNC: 0.72 MG/DL — SIGNIFICANT CHANGE UP (ref 0.5–1.3)
GLUCOSE SERPL-MCNC: 197 MG/DL — HIGH (ref 70–99)
HCT VFR BLD CALC: 44.3 % — SIGNIFICANT CHANGE UP (ref 34.5–45)
HGB BLD-MCNC: 14.5 G/DL — SIGNIFICANT CHANGE UP (ref 11.5–15.5)
MCHC RBC-ENTMCNC: 30.1 PG — SIGNIFICANT CHANGE UP (ref 27–34)
MCHC RBC-ENTMCNC: 32.9 GM/DL — SIGNIFICANT CHANGE UP (ref 32–36)
MCV RBC AUTO: 91.5 FL — SIGNIFICANT CHANGE UP (ref 80–100)
PLATELET # BLD AUTO: 238 K/UL — SIGNIFICANT CHANGE UP (ref 150–400)
POTASSIUM SERPL-MCNC: 4.7 MMOL/L — SIGNIFICANT CHANGE UP (ref 3.5–5.3)
POTASSIUM SERPL-SCNC: 4.7 MMOL/L — SIGNIFICANT CHANGE UP (ref 3.5–5.3)
PROT SERPL-MCNC: 7.3 G/DL — SIGNIFICANT CHANGE UP (ref 6–8.3)
RBC # BLD: 4.84 M/UL — SIGNIFICANT CHANGE UP (ref 3.8–5.2)
RBC # FLD: 13.3 % — SIGNIFICANT CHANGE UP (ref 10.3–14.5)
SODIUM SERPL-SCNC: 138 MMOL/L — SIGNIFICANT CHANGE UP (ref 135–145)
WBC # BLD: 8.8 K/UL — SIGNIFICANT CHANGE UP (ref 3.8–10.5)
WBC # FLD AUTO: 8.8 K/UL — SIGNIFICANT CHANGE UP (ref 3.8–10.5)

## 2019-05-03 PROCEDURE — C1769: CPT

## 2019-05-03 PROCEDURE — 99152 MOD SED SAME PHYS/QHP 5/>YRS: CPT | Mod: GC

## 2019-05-03 PROCEDURE — C1894: CPT

## 2019-05-03 PROCEDURE — 93010 ELECTROCARDIOGRAM REPORT: CPT

## 2019-05-03 PROCEDURE — C1887: CPT

## 2019-05-03 PROCEDURE — ZZZZZ: CPT

## 2019-05-03 PROCEDURE — 93456 R HRT CORONARY ARTERY ANGIO: CPT

## 2019-05-03 PROCEDURE — 80053 COMPREHEN METABOLIC PANEL: CPT

## 2019-05-03 PROCEDURE — 99153 MOD SED SAME PHYS/QHP EA: CPT

## 2019-05-03 PROCEDURE — 99152 MOD SED SAME PHYS/QHP 5/>YRS: CPT

## 2019-05-03 PROCEDURE — 85027 COMPLETE CBC AUTOMATED: CPT

## 2019-05-03 PROCEDURE — 93005 ELECTROCARDIOGRAM TRACING: CPT

## 2019-05-03 PROCEDURE — 93456 R HRT CORONARY ARTERY ANGIO: CPT | Mod: 26,GC

## 2019-05-03 RX ORDER — SODIUM CHLORIDE 9 MG/ML
3 INJECTION INTRAMUSCULAR; INTRAVENOUS; SUBCUTANEOUS EVERY 8 HOURS
Qty: 0 | Refills: 0 | Status: DISCONTINUED | OUTPATIENT
Start: 2019-05-03 | End: 2019-05-18

## 2019-05-03 RX ORDER — DIPHENHYDRAMINE HCL 50 MG
25 CAPSULE ORAL ONCE
Qty: 0 | Refills: 0 | Status: COMPLETED | OUTPATIENT
Start: 2019-05-03 | End: 2019-05-03

## 2019-05-03 RX ADMIN — Medication 25 MILLIGRAM(S): at 12:08

## 2019-05-03 NOTE — H&P CARDIOLOGY - FAMILY HISTORY
Family history of breast cancer in mother     Sibling  Still living? Unknown  Family history of heart disease, Age at diagnosis: Age Unknown  Family history of colon cancer, Age at diagnosis: Age Unknown

## 2019-05-03 NOTE — H&P CARDIOLOGY - HISTORY OF PRESENT ILLNESS
83 year old female with PMH of former smoker, COPD, pulm nodule, AS, GERD, gastric ulcer -s/p partial gastrectomy 1966, spinal stenosis s/p fusion 2017  complicated by PE -s/p Ac for 1 year (discontinued 9 months ago), pre DM and osteoporosis presents today for R+L heart cath. Patient reports known AS followed by Dr Paris. Developed progressive BROCK, fatigue and intermittent dizziness over the past year. Evaluated by Dr Paris where repeat ECHO shows severe AS, and was referred to valve clinic for evaluation for SMITHA.     +CONTRAST DYE ALLERGY-Premedicated as per protocol 83 year old female with PMH of former smoker, COPD, pulm nodule, AS, GERD, gastric ulcer -s/p partial gastrectomy 1966, spinal stenosis s/p fusion 2017  complicated by PE -s/p AC for 1 year (discontinued 9 months ago), pre DM and osteoporosis presents today for R+L heart cath. Patient reports known AS followed by Dr Paris. Developed progressive BROCK, fatigue and intermittent dizziness over the past year. Evaluated by Dr Paris where repeat ECHO shows severe AS, and was referred to valve clinic for evaluation for SMITHA. Here today for R+L heart cath    +CONTRAST DYE ALLERGY-Premedicated as per protocol

## 2019-05-10 ENCOUNTER — APPOINTMENT (OUTPATIENT)
Dept: CARDIOLOGY | Facility: CLINIC | Age: 84
End: 2019-05-10

## 2019-05-10 ENCOUNTER — OUTPATIENT (OUTPATIENT)
Dept: OUTPATIENT SERVICES | Facility: HOSPITAL | Age: 84
LOS: 1 days | End: 2019-05-10
Payer: COMMERCIAL

## 2019-05-10 DIAGNOSIS — Z98.890 OTHER SPECIFIED POSTPROCEDURAL STATES: Chronic | ICD-10-CM

## 2019-05-10 DIAGNOSIS — I35.0 NONRHEUMATIC AORTIC (VALVE) STENOSIS: ICD-10-CM

## 2019-05-10 DIAGNOSIS — Z98.41 CATARACT EXTRACTION STATUS, RIGHT EYE: Chronic | ICD-10-CM

## 2019-05-10 DIAGNOSIS — Z90.3 ACQUIRED ABSENCE OF STOMACH [PART OF]: Chronic | ICD-10-CM

## 2019-05-10 DIAGNOSIS — R07.9 CHEST PAIN, UNSPECIFIED: ICD-10-CM

## 2019-05-10 DIAGNOSIS — Z90.49 ACQUIRED ABSENCE OF OTHER SPECIFIED PARTS OF DIGESTIVE TRACT: Chronic | ICD-10-CM

## 2019-05-10 PROCEDURE — 75572 CT HRT W/3D IMAGE: CPT | Mod: 26

## 2019-05-10 PROCEDURE — 75572 CT HRT W/3D IMAGE: CPT

## 2019-05-31 ENCOUNTER — APPOINTMENT (OUTPATIENT)
Dept: ULTRASOUND IMAGING | Facility: HOSPITAL | Age: 84
End: 2019-05-31

## 2019-05-31 ENCOUNTER — OUTPATIENT (OUTPATIENT)
Dept: OUTPATIENT SERVICES | Facility: HOSPITAL | Age: 84
LOS: 1 days | End: 2019-05-31
Payer: COMMERCIAL

## 2019-05-31 VITALS
HEIGHT: 59 IN | WEIGHT: 111.99 LBS | SYSTOLIC BLOOD PRESSURE: 128 MMHG | DIASTOLIC BLOOD PRESSURE: 84 MMHG | RESPIRATION RATE: 16 BRPM | TEMPERATURE: 98 F | OXYGEN SATURATION: 97 % | HEART RATE: 63 BPM

## 2019-05-31 DIAGNOSIS — T50.995A ADVERSE EFFECT OF OTHER DRUGS, MEDICAMENTS AND BIOLOGICAL SUBSTANCES, INITIAL ENCOUNTER: ICD-10-CM

## 2019-05-31 DIAGNOSIS — I35.0 NONRHEUMATIC AORTIC (VALVE) STENOSIS: ICD-10-CM

## 2019-05-31 DIAGNOSIS — Z90.49 ACQUIRED ABSENCE OF OTHER SPECIFIED PARTS OF DIGESTIVE TRACT: Chronic | ICD-10-CM

## 2019-05-31 DIAGNOSIS — Z98.41 CATARACT EXTRACTION STATUS, RIGHT EYE: Chronic | ICD-10-CM

## 2019-05-31 DIAGNOSIS — Z90.3 ACQUIRED ABSENCE OF STOMACH [PART OF]: Chronic | ICD-10-CM

## 2019-05-31 DIAGNOSIS — Z01.818 ENCOUNTER FOR OTHER PREPROCEDURAL EXAMINATION: ICD-10-CM

## 2019-05-31 DIAGNOSIS — Z98.890 OTHER SPECIFIED POSTPROCEDURAL STATES: Chronic | ICD-10-CM

## 2019-05-31 DIAGNOSIS — Z29.9 ENCOUNTER FOR PROPHYLACTIC MEASURES, UNSPECIFIED: ICD-10-CM

## 2019-05-31 LAB
ANION GAP SERPL CALC-SCNC: 11 MMOL/L — SIGNIFICANT CHANGE UP (ref 5–17)
ANTIBODY ID 1_1: SIGNIFICANT CHANGE UP
ANTIBODY ID 1_2: SIGNIFICANT CHANGE UP
BLD GP AB SCN SERPL QL: POSITIVE — SIGNIFICANT CHANGE UP
BUN SERPL-MCNC: 20 MG/DL — SIGNIFICANT CHANGE UP (ref 7–23)
CALCIUM SERPL-MCNC: 9.4 MG/DL — SIGNIFICANT CHANGE UP (ref 8.4–10.5)
CHLORIDE SERPL-SCNC: 102 MMOL/L — SIGNIFICANT CHANGE UP (ref 96–108)
CO2 SERPL-SCNC: 26 MMOL/L — SIGNIFICANT CHANGE UP (ref 22–31)
CREAT SERPL-MCNC: 0.75 MG/DL — SIGNIFICANT CHANGE UP (ref 0.5–1.3)
DAT POLY-SP REAG RBC QL: NEGATIVE — SIGNIFICANT CHANGE UP
GLUCOSE SERPL-MCNC: 108 MG/DL — HIGH (ref 70–99)
HBA1C BLD-MCNC: 6.3 % — HIGH (ref 4–5.6)
HCT VFR BLD CALC: 41.8 % — SIGNIFICANT CHANGE UP (ref 34.5–45)
HGB BLD-MCNC: 13.1 G/DL — SIGNIFICANT CHANGE UP (ref 11.5–15.5)
MCHC RBC-ENTMCNC: 29.4 PG — SIGNIFICANT CHANGE UP (ref 27–34)
MCHC RBC-ENTMCNC: 31.3 GM/DL — LOW (ref 32–36)
MCV RBC AUTO: 93.7 FL — SIGNIFICANT CHANGE UP (ref 80–100)
PLATELET # BLD AUTO: 251 K/UL — SIGNIFICANT CHANGE UP (ref 150–400)
POTASSIUM SERPL-MCNC: 4.9 MMOL/L — SIGNIFICANT CHANGE UP (ref 3.5–5.3)
POTASSIUM SERPL-SCNC: 4.9 MMOL/L — SIGNIFICANT CHANGE UP (ref 3.5–5.3)
RBC # BLD: 4.46 M/UL — SIGNIFICANT CHANGE UP (ref 3.8–5.2)
RBC # FLD: 14.1 % — SIGNIFICANT CHANGE UP (ref 10.3–14.5)
RH IG SCN BLD-IMP: NEGATIVE — SIGNIFICANT CHANGE UP
SODIUM SERPL-SCNC: 139 MMOL/L — SIGNIFICANT CHANGE UP (ref 135–145)
WBC # BLD: 7.16 K/UL — SIGNIFICANT CHANGE UP (ref 3.8–10.5)
WBC # FLD AUTO: 7.16 K/UL — SIGNIFICANT CHANGE UP (ref 3.8–10.5)

## 2019-05-31 PROCEDURE — 71046 X-RAY EXAM CHEST 2 VIEWS: CPT | Mod: 26

## 2019-05-31 PROCEDURE — 87640 STAPH A DNA AMP PROBE: CPT

## 2019-05-31 PROCEDURE — 80048 BASIC METABOLIC PNL TOTAL CA: CPT

## 2019-05-31 PROCEDURE — 85027 COMPLETE CBC AUTOMATED: CPT

## 2019-05-31 PROCEDURE — 87641 MR-STAPH DNA AMP PROBE: CPT

## 2019-05-31 PROCEDURE — 86870 RBC ANTIBODY IDENTIFICATION: CPT

## 2019-05-31 PROCEDURE — 86880 COOMBS TEST DIRECT: CPT

## 2019-05-31 PROCEDURE — 93880 EXTRACRANIAL BILAT STUDY: CPT | Mod: 26

## 2019-05-31 PROCEDURE — 86900 BLOOD TYPING SEROLOGIC ABO: CPT

## 2019-05-31 PROCEDURE — 86901 BLOOD TYPING SEROLOGIC RH(D): CPT

## 2019-05-31 PROCEDURE — G0463: CPT

## 2019-05-31 PROCEDURE — 86850 RBC ANTIBODY SCREEN: CPT

## 2019-05-31 PROCEDURE — 93880 EXTRACRANIAL BILAT STUDY: CPT

## 2019-05-31 PROCEDURE — 83036 HEMOGLOBIN GLYCOSYLATED A1C: CPT

## 2019-05-31 PROCEDURE — 71046 X-RAY EXAM CHEST 2 VIEWS: CPT

## 2019-05-31 RX ORDER — CHLORHEXIDINE GLUCONATE 213 G/1000ML
1 SOLUTION TOPICAL ONCE
Refills: 0 | Status: DISCONTINUED | OUTPATIENT
Start: 2019-06-05 | End: 2019-06-05

## 2019-05-31 RX ORDER — LIDOCAINE HCL 20 MG/ML
0.2 VIAL (ML) INJECTION ONCE
Refills: 0 | Status: DISCONTINUED | OUTPATIENT
Start: 2019-06-05 | End: 2019-06-05

## 2019-05-31 RX ORDER — SODIUM CHLORIDE 9 MG/ML
3 INJECTION INTRAMUSCULAR; INTRAVENOUS; SUBCUTANEOUS EVERY 8 HOURS
Refills: 0 | Status: DISCONTINUED | OUTPATIENT
Start: 2019-06-05 | End: 2019-06-05

## 2019-05-31 NOTE — H&P PST ADULT - NSICDXPROBLEM_GEN_ALL_CORE_FT
PROBLEM DIAGNOSES  Problem: Nonrheumatic aortic valve stenosis  Assessment and Plan:   TAVR on 6/5/19  Pre- Op Instructions discussed   labs sent,CXR, carotid doppler  done   pt will continue aspirin     Problem: Allergy to IVP dye  Assessment and Plan:     Problem: Prophylactic measure  Assessment and Plan: The Caprini score indicates this patient is at risk for a VTE event (score 3-5).  Most surgical patients in this group would benefit from pharmacologic prophylaxis.  The surgical team will determine the balance between VTE risk and bleeding risk

## 2019-05-31 NOTE — H&P PST ADULT - HISTORY OF PRESENT ILLNESS
83 year old female with PMH of former smoker, COPD, pulm nodule, AS, GERD, gastric ulcer -s/p partial gastrectomy 1966, spinal stenosis s/p fusion 2017  complicated by PE -s/p AC for 1 year (discontinued 9 months ago), pre DM and osteoporosis. Patient reports known AS followed by Dr Paris. Developed progressive BROCK, fatigue and intermittent dizziness over the past year. Evaluated by Dr Paris where repeat ECHO shows severe AS . pt was seen and evaluated by CT surgery s/p cardiac cath .Presents to PST for scheduled TAVR on 6/ 83 year old female with PMH of former smoker, COPD, pulm nodule, AS, GERD, gastric ulcer -s/p partial gastrectomy 1966, spinal stenosis s/p fusion 2017  complicated by PE -s/p AC for 1 year (discontinued 9 months ago), pre DM and osteoporosis. Patient reports known AS followed by Dr Paris. Developed progressive BROCK, fatigue and intermittent dizziness over the past year. Evaluated by Dr Paris where repeat ECHO shows severe AS . Pt was seen and evaluated by CT surgery s/p cardiac cath .Presents to PST for scheduled TAVR on 6/5/19.

## 2019-05-31 NOTE — H&P PST ADULT - NSICDXPASTSURGICALHX_GEN_ALL_CORE_FT
PAST SURGICAL HISTORY:  H/O laminectomy april 2005 ,Fusion- 2017    History of partial gastrectomy secondary to ulcer- 1996    S/P appendectomy 1996    S/P bilateral cataract extraction 10 years ago

## 2019-05-31 NOTE — H&P PST ADULT - NSICDXPASTMEDICALHX_GEN_ALL_CORE_FT
PAST MEDICAL HISTORY:  Abnormal echocardiogram pt currently wearing a holter monitor    Acid reflux     Ankle fracture left    Aortic stenosis moderate by ECHO 7/2017    Borderline diabetes     Former smoker     History of pulmonary embolism 2017 after  back surgery- treated with AC  for 1 year    HLD (hyperlipidemia)     Osteoporosis     Other intervertebral disc displacement, lumbar region     Poor historian     Radiculopathy     Shingles outbreak in 2005 2 months after back surgery PAST MEDICAL HISTORY:  Acid reflux     Ankle fracture left    Aortic stenosis     Borderline diabetes     COPD with asthma not on any meds ,deneis any exacerbation    Former smoker     History of pulmonary embolism 2017 after  back surgery- treated with AC  for 1 year - Ct chest done as per surgeon    History of smoking     HLD (hyperlipidemia)     Osteoporosis     Other intervertebral disc displacement, lumbar region     Poor historian     Radiculopathy     Shingles outbreak in 2005 2 months after back surgery

## 2019-05-31 NOTE — H&P PST ADULT - NSICDXFAMILYHX_GEN_ALL_CORE_FT
FAMILY HISTORY:  Family history of breast cancer in mother    Sibling  Still living? Unknown  Family history of colon cancer, Age at diagnosis: Age Unknown  Family history of heart disease, Age at diagnosis: Age Unknown

## 2019-05-31 NOTE — H&P PST ADULT - ASSESSMENT
CAPRINI SCORE [CLOT updated 18]    AGE RELATED RISK FACTORS                                                       MOBILITY RELATED FACTORS  [ ] Age 41-60 years                                            (1 Point)                    [ ] Bed rest                                                        (1 Point)  [ ] Age: 61-74 years                                           (2 Points)                  [ ] Plaster cast                                                   (2 Points)  [x ] Age= 75 years                                              (3 Points)                    [ ] Bed bound for more than 72 hours                 (2 Points)    DISEASE RELATED RISK FACTORS                                               GENDER SPECIFIC FACTORS  [ ] Edema in the lower extremities                       (1 Point)              [ ] Pregnancy                                                     (1 Point)  [ ] Varicose veins                                               (1 Point)                     [ ] Post-partum < 6 weeks                                   (1 Point)             [ ] BMI > 25 Kg/m2                                            (1 Point)                     [ ] Hormonal therapy  or oral contraception          (1 Point)                 [ ] Sepsis (in the previous month)                        (1 Point)               [ ] History of pregnancy complications                 (1 point)  [ ] Pneumonia or serious lung disease                                               [ ] Unexplained or recurrent                     (1 Point)           (in the previous month)                               (1 Point)  [ ] Abnormal pulmonary function test                     (1 Point)                 SURGERY RELATED RISK FACTORS  [ ] Acute myocardial infarction                              (1 Point)               [ ]  Section                                             (1 Point)  [ ] Congestive heart failure (in the previous month)  (1 Point)      [ ] Minor surgery                                                  (1 Point)   [ ] Inflammatory bowel disease                             (1 Point)               [ ] Arthroscopic surgery                                        (2 Points)  [ ] Central venous access                                      (2 Points)                [ x] General surgery lasting more than 45 minutes (2 points)  [ ] Present or previous malignancy                     (2 Points)                [ ] Elective arthroplasty                                         (5 points)    [ ] Stroke (in the previous month)                          (5 Points)                                                                                                                                                           HEMATOLOGY RELATED FACTORS                                                 TRAUMA RELATED RISK FACTORS  [ ] Prior episodes of VTE                                     (3 Points)                [ ] Fracture of the hip, pelvis, or leg                       (5 Points)  [ ] Positive family history for VTE                         (3 Points)             [ ] Acute spinal cord injury (in the previous month)  (5 Points)  [ ] Prothrombin 43845 A                                     (3 Points)               [ ] Paralysis  (less than 1 month)                             (5 Points)  [ ] Factor V Leiden                                             (3 Points)                  [ ] Multiple Trauma within 1 month                        (5 Points)  [ ] Lupus anticoagulants                                     (3 Points)                                                           [ ] Anticardiolipin antibodies                               (3 Points)                                                       [ ] High homocysteine in the blood                      (3 Points)                                             [ ] Other congenital or acquired thrombophilia      (3 Points)                                                [ ] Heparin induced thrombocytopenia                  (3 Points)                                     Total Score [  5        ]

## 2019-06-01 LAB
MRSA PCR RESULT.: SIGNIFICANT CHANGE UP
S AUREUS DNA NOSE QL NAA+PROBE: SIGNIFICANT CHANGE UP

## 2019-06-05 ENCOUNTER — INPATIENT (INPATIENT)
Facility: HOSPITAL | Age: 84
LOS: 1 days | Discharge: HOME | DRG: 267 | End: 2019-06-07
Attending: THORACIC SURGERY (CARDIOTHORACIC VASCULAR SURGERY) | Admitting: THORACIC SURGERY (CARDIOTHORACIC VASCULAR SURGERY)
Payer: COMMERCIAL

## 2019-06-05 ENCOUNTER — APPOINTMENT (OUTPATIENT)
Dept: CARDIOTHORACIC SURGERY | Facility: HOSPITAL | Age: 84
End: 2019-06-05

## 2019-06-05 VITALS
HEIGHT: 59 IN | DIASTOLIC BLOOD PRESSURE: 78 MMHG | SYSTOLIC BLOOD PRESSURE: 124 MMHG | RESPIRATION RATE: 16 BRPM | WEIGHT: 111.55 LBS | HEART RATE: 66 BPM | TEMPERATURE: 98 F | OXYGEN SATURATION: 95 %

## 2019-06-05 DIAGNOSIS — Z98.41 CATARACT EXTRACTION STATUS, RIGHT EYE: Chronic | ICD-10-CM

## 2019-06-05 DIAGNOSIS — Z90.49 ACQUIRED ABSENCE OF OTHER SPECIFIED PARTS OF DIGESTIVE TRACT: Chronic | ICD-10-CM

## 2019-06-05 DIAGNOSIS — Z98.890 OTHER SPECIFIED POSTPROCEDURAL STATES: Chronic | ICD-10-CM

## 2019-06-05 DIAGNOSIS — I35.0 NONRHEUMATIC AORTIC (VALVE) STENOSIS: ICD-10-CM

## 2019-06-05 DIAGNOSIS — Z90.3 ACQUIRED ABSENCE OF STOMACH [PART OF]: Chronic | ICD-10-CM

## 2019-06-05 PROBLEM — Z86.711 PERSONAL HISTORY OF PULMONARY EMBOLISM: Chronic | Status: ACTIVE | Noted: 2019-05-31

## 2019-06-05 PROBLEM — J44.9 CHRONIC OBSTRUCTIVE PULMONARY DISEASE, UNSPECIFIED: Chronic | Status: ACTIVE | Noted: 2019-05-31

## 2019-06-05 PROBLEM — Z87.891 PERSONAL HISTORY OF NICOTINE DEPENDENCE: Chronic | Status: ACTIVE | Noted: 2019-05-31

## 2019-06-05 LAB
ALBUMIN SERPL ELPH-MCNC: 3.5 G/DL — SIGNIFICANT CHANGE UP (ref 3.3–5)
ALP SERPL-CCNC: 66 U/L — SIGNIFICANT CHANGE UP (ref 40–120)
ALT FLD-CCNC: 20 U/L — SIGNIFICANT CHANGE UP (ref 10–45)
ANION GAP SERPL CALC-SCNC: 12 MMOL/L — SIGNIFICANT CHANGE UP (ref 5–17)
APTT BLD: 37.3 SEC — HIGH (ref 27.5–36.3)
AST SERPL-CCNC: 21 U/L — SIGNIFICANT CHANGE UP (ref 10–40)
BASOPHILS # BLD AUTO: 0 K/UL — SIGNIFICANT CHANGE UP (ref 0–0.2)
BASOPHILS NFR BLD AUTO: 0 % — SIGNIFICANT CHANGE UP (ref 0–2)
BILIRUB SERPL-MCNC: 0.6 MG/DL — SIGNIFICANT CHANGE UP (ref 0.2–1.2)
BLD GP AB SCN SERPL QL: POSITIVE — SIGNIFICANT CHANGE UP
BUN SERPL-MCNC: 22 MG/DL — SIGNIFICANT CHANGE UP (ref 7–23)
CALCIUM SERPL-MCNC: 8.7 MG/DL — SIGNIFICANT CHANGE UP (ref 8.4–10.5)
CHLORIDE SERPL-SCNC: 102 MMOL/L — SIGNIFICANT CHANGE UP (ref 96–108)
CO2 SERPL-SCNC: 22 MMOL/L — SIGNIFICANT CHANGE UP (ref 22–31)
CREAT SERPL-MCNC: 0.73 MG/DL — SIGNIFICANT CHANGE UP (ref 0.5–1.3)
EOSINOPHIL # BLD AUTO: 0 K/UL — SIGNIFICANT CHANGE UP (ref 0–0.5)
EOSINOPHIL NFR BLD AUTO: 0.2 % — SIGNIFICANT CHANGE UP (ref 0–6)
FIBRINOGEN PPP-MCNC: 517 MG/DL — HIGH (ref 350–510)
GLUCOSE BLDC GLUCOMTR-MCNC: 169 MG/DL — HIGH (ref 70–99)
GLUCOSE SERPL-MCNC: 175 MG/DL — HIGH (ref 70–99)
HCT VFR BLD CALC: 38.1 % — SIGNIFICANT CHANGE UP (ref 34.5–45)
HGB BLD-MCNC: 13.1 G/DL — SIGNIFICANT CHANGE UP (ref 11.5–15.5)
INR BLD: 1.21 RATIO — HIGH (ref 0.88–1.16)
LYMPHOCYTES # BLD AUTO: 0.8 K/UL — LOW (ref 1–3.3)
LYMPHOCYTES # BLD AUTO: 8.2 % — LOW (ref 13–44)
MAGNESIUM SERPL-MCNC: 2 MG/DL — SIGNIFICANT CHANGE UP (ref 1.6–2.6)
MCHC RBC-ENTMCNC: 32 PG — SIGNIFICANT CHANGE UP (ref 27–34)
MCHC RBC-ENTMCNC: 34.4 GM/DL — SIGNIFICANT CHANGE UP (ref 32–36)
MCV RBC AUTO: 92.9 FL — SIGNIFICANT CHANGE UP (ref 80–100)
MONOCYTES # BLD AUTO: 0.2 K/UL — SIGNIFICANT CHANGE UP (ref 0–0.9)
MONOCYTES NFR BLD AUTO: 2.6 % — SIGNIFICANT CHANGE UP (ref 2–14)
NEUTROPHILS # BLD AUTO: 8.2 K/UL — HIGH (ref 1.8–7.4)
NEUTROPHILS NFR BLD AUTO: 89 % — HIGH (ref 43–77)
PHOSPHATE SERPL-MCNC: 5 MG/DL — HIGH (ref 2.5–4.5)
PLATELET # BLD AUTO: 211 K/UL — SIGNIFICANT CHANGE UP (ref 150–400)
POTASSIUM SERPL-MCNC: 4.8 MMOL/L — SIGNIFICANT CHANGE UP (ref 3.5–5.3)
POTASSIUM SERPL-SCNC: 4.8 MMOL/L — SIGNIFICANT CHANGE UP (ref 3.5–5.3)
PROT SERPL-MCNC: 6.2 G/DL — SIGNIFICANT CHANGE UP (ref 6–8.3)
PROTHROM AB SERPL-ACNC: 13.9 SEC — HIGH (ref 10–12.9)
RBC # BLD: 4.1 M/UL — SIGNIFICANT CHANGE UP (ref 3.8–5.2)
RBC # FLD: 12.8 % — SIGNIFICANT CHANGE UP (ref 10.3–14.5)
RH IG SCN BLD-IMP: NEGATIVE — SIGNIFICANT CHANGE UP
SODIUM SERPL-SCNC: 136 MMOL/L — SIGNIFICANT CHANGE UP (ref 135–145)
WBC # BLD: 9.2 K/UL — SIGNIFICANT CHANGE UP (ref 3.8–10.5)
WBC # FLD AUTO: 9.2 K/UL — SIGNIFICANT CHANGE UP (ref 3.8–10.5)

## 2019-06-05 PROCEDURE — 33361 REPLACE AORTIC VALVE PERQ: CPT | Mod: 62,Q0

## 2019-06-05 PROCEDURE — 93306 TTE W/DOPPLER COMPLETE: CPT | Mod: 26

## 2019-06-05 PROCEDURE — 93010 ELECTROCARDIOGRAM REPORT: CPT

## 2019-06-05 PROCEDURE — 33361 REPLACE AORTIC VALVE PERQ: CPT | Mod: Q0,62

## 2019-06-05 RX ORDER — PANTOPRAZOLE SODIUM 20 MG/1
40 TABLET, DELAYED RELEASE ORAL
Refills: 0 | Status: DISCONTINUED | OUTPATIENT
Start: 2019-06-05 | End: 2019-06-07

## 2019-06-05 RX ORDER — SIMVASTATIN 20 MG/1
20 TABLET, FILM COATED ORAL AT BEDTIME
Refills: 0 | Status: DISCONTINUED | OUTPATIENT
Start: 2019-06-05 | End: 2019-06-07

## 2019-06-05 RX ORDER — GABAPENTIN 400 MG/1
600 CAPSULE ORAL THREE TIMES A DAY
Refills: 0 | Status: DISCONTINUED | OUTPATIENT
Start: 2019-06-06 | End: 2019-06-07

## 2019-06-05 RX ORDER — ASPIRIN/CALCIUM CARB/MAGNESIUM 324 MG
81 TABLET ORAL ONCE
Refills: 0 | Status: COMPLETED | OUTPATIENT
Start: 2019-06-05 | End: 2019-06-05

## 2019-06-05 RX ORDER — CITALOPRAM 10 MG/1
20 TABLET, FILM COATED ORAL DAILY
Refills: 0 | Status: DISCONTINUED | OUTPATIENT
Start: 2019-06-05 | End: 2019-06-07

## 2019-06-05 RX ORDER — CLOPIDOGREL BISULFATE 75 MG/1
75 TABLET, FILM COATED ORAL ONCE
Refills: 0 | Status: COMPLETED | OUTPATIENT
Start: 2019-06-05 | End: 2019-06-05

## 2019-06-05 RX ORDER — CLOPIDOGREL BISULFATE 75 MG/1
75 TABLET, FILM COATED ORAL DAILY
Refills: 0 | Status: DISCONTINUED | OUTPATIENT
Start: 2019-06-05 | End: 2019-06-07

## 2019-06-05 RX ORDER — ASPIRIN/CALCIUM CARB/MAGNESIUM 324 MG
81 TABLET ORAL DAILY
Refills: 0 | Status: DISCONTINUED | OUTPATIENT
Start: 2019-06-05 | End: 2019-06-07

## 2019-06-05 RX ORDER — ACETAMINOPHEN 500 MG
650 TABLET ORAL EVERY 6 HOURS
Refills: 0 | Status: DISCONTINUED | OUTPATIENT
Start: 2019-06-05 | End: 2019-06-07

## 2019-06-05 RX ORDER — DOCUSATE SODIUM 100 MG
100 CAPSULE ORAL THREE TIMES A DAY
Refills: 0 | Status: DISCONTINUED | OUTPATIENT
Start: 2019-06-05 | End: 2019-06-07

## 2019-06-05 RX ORDER — SODIUM CHLORIDE 9 MG/ML
1000 INJECTION INTRAMUSCULAR; INTRAVENOUS; SUBCUTANEOUS
Refills: 0 | Status: DISCONTINUED | OUTPATIENT
Start: 2019-06-05 | End: 2019-06-06

## 2019-06-05 RX ORDER — TRAMADOL HYDROCHLORIDE 50 MG/1
50 TABLET ORAL AT BEDTIME
Refills: 0 | Status: DISCONTINUED | OUTPATIENT
Start: 2019-06-05 | End: 2019-06-07

## 2019-06-05 RX ADMIN — Medication 100 MILLIGRAM(S): at 22:44

## 2019-06-05 RX ADMIN — TRAMADOL HYDROCHLORIDE 50 MILLIGRAM(S): 50 TABLET ORAL at 23:15

## 2019-06-05 RX ADMIN — CITALOPRAM 20 MILLIGRAM(S): 10 TABLET, FILM COATED ORAL at 22:44

## 2019-06-05 RX ADMIN — TRAMADOL HYDROCHLORIDE 50 MILLIGRAM(S): 50 TABLET ORAL at 22:44

## 2019-06-05 RX ADMIN — Medication 81 MILLIGRAM(S): at 22:44

## 2019-06-05 RX ADMIN — CLOPIDOGREL BISULFATE 75 MILLIGRAM(S): 75 TABLET, FILM COATED ORAL at 22:44

## 2019-06-05 RX ADMIN — SIMVASTATIN 20 MILLIGRAM(S): 20 TABLET, FILM COATED ORAL at 22:44

## 2019-06-05 NOTE — PRE-ANESTHESIA EVALUATION ADULT - NSANTHPMHFT_GEN_ALL_CORE
83 year old female with PMH of former smoker, COPD, pulm nodule, AS, GERD, gastric ulcer -s/p partial gastrectomy 1966, spinal stenosis s/p fusion 2017  complicated by PE -s/p AC for 1 year (discontinued 9 months ago), pre DM and osteoporosis. Patient reports known AS followed by Dr Paris. Developed progressive BROCK, fatigue and intermittent dizziness over the past year. Evaluated by Dr Paris where repeat ECHO shows severe AS .

## 2019-06-05 NOTE — PRE-ANESTHESIA EVALUATION ADULT - NSANTHOSAYNRD_GEN_A_CORE
never tested/No. PETTY screening performed.  STOP BANG Legend: 0-2 = LOW Risk; 3-4 = INTERMEDIATE Risk; 5-8 = HIGH Risk

## 2019-06-05 NOTE — PROGRESS NOTE ADULT - SUBJECTIVE AND OBJECTIVE BOX
This patient has been implanted with a Transcatheter Aortic Valve Implant under the following NCT/BRADLEY:    TAVR:    Commercial Implant NCT [id number], BRADLEY N/A and will be placed into the TVT Registry.        BRAD Rodriguez  [id number]

## 2019-06-05 NOTE — BRIEF OPERATIVE NOTE - NSICDXBRIEFPROCEDURE_GEN_ALL_CORE_FT
PROCEDURES:  TAVR, femoral approach, by cardiology 05-Jun-2019 16:12:54 R transfemoral TAVR, #26 Core Evolut R and R CFA PTA for post op stenosis of R CFA Tanya Cantrell

## 2019-06-06 ENCOUNTER — OTHER (OUTPATIENT)
Age: 84
End: 2019-06-06

## 2019-06-06 DIAGNOSIS — Z95.2 PRESENCE OF PROSTHETIC HEART VALVE: ICD-10-CM

## 2019-06-06 LAB
ALBUMIN SERPL ELPH-MCNC: 3.2 G/DL — LOW (ref 3.3–5)
ALP SERPL-CCNC: 55 U/L — SIGNIFICANT CHANGE UP (ref 40–120)
ALT FLD-CCNC: 17 U/L — SIGNIFICANT CHANGE UP (ref 10–45)
ANION GAP SERPL CALC-SCNC: 9 MMOL/L — SIGNIFICANT CHANGE UP (ref 5–17)
APTT BLD: 32.2 SEC — SIGNIFICANT CHANGE UP (ref 27.5–36.3)
AST SERPL-CCNC: 16 U/L — SIGNIFICANT CHANGE UP (ref 10–40)
BILIRUB SERPL-MCNC: 0.4 MG/DL — SIGNIFICANT CHANGE UP (ref 0.2–1.2)
BUN SERPL-MCNC: 25 MG/DL — HIGH (ref 7–23)
CALCIUM SERPL-MCNC: 8.2 MG/DL — LOW (ref 8.4–10.5)
CHLORIDE SERPL-SCNC: 104 MMOL/L — SIGNIFICANT CHANGE UP (ref 96–108)
CO2 SERPL-SCNC: 24 MMOL/L — SIGNIFICANT CHANGE UP (ref 22–31)
CREAT SERPL-MCNC: 0.8 MG/DL — SIGNIFICANT CHANGE UP (ref 0.5–1.3)
GLUCOSE SERPL-MCNC: 161 MG/DL — HIGH (ref 70–99)
HCT VFR BLD CALC: 33 % — LOW (ref 34.5–45)
HGB BLD-MCNC: 11.2 G/DL — LOW (ref 11.5–15.5)
INR BLD: 1.18 RATIO — HIGH (ref 0.88–1.16)
MAGNESIUM SERPL-MCNC: 2 MG/DL — SIGNIFICANT CHANGE UP (ref 1.6–2.6)
MCHC RBC-ENTMCNC: 31.6 PG — SIGNIFICANT CHANGE UP (ref 27–34)
MCHC RBC-ENTMCNC: 34 GM/DL — SIGNIFICANT CHANGE UP (ref 32–36)
MCV RBC AUTO: 93.1 FL — SIGNIFICANT CHANGE UP (ref 80–100)
PHOSPHATE SERPL-MCNC: 4.5 MG/DL — SIGNIFICANT CHANGE UP (ref 2.5–4.5)
PLATELET # BLD AUTO: 194 K/UL — SIGNIFICANT CHANGE UP (ref 150–400)
POTASSIUM SERPL-MCNC: 4.2 MMOL/L — SIGNIFICANT CHANGE UP (ref 3.5–5.3)
POTASSIUM SERPL-SCNC: 4.2 MMOL/L — SIGNIFICANT CHANGE UP (ref 3.5–5.3)
PROT SERPL-MCNC: 5.4 G/DL — LOW (ref 6–8.3)
PROTHROM AB SERPL-ACNC: 13.5 SEC — HIGH (ref 10–12.9)
RBC # BLD: 3.55 M/UL — LOW (ref 3.8–5.2)
RBC # FLD: 12.8 % — SIGNIFICANT CHANGE UP (ref 10.3–14.5)
SODIUM SERPL-SCNC: 137 MMOL/L — SIGNIFICANT CHANGE UP (ref 135–145)
WBC # BLD: 11.2 K/UL — HIGH (ref 3.8–10.5)
WBC # FLD AUTO: 11.2 K/UL — HIGH (ref 3.8–10.5)

## 2019-06-06 PROCEDURE — 71045 X-RAY EXAM CHEST 1 VIEW: CPT | Mod: 26

## 2019-06-06 PROCEDURE — 93010 ELECTROCARDIOGRAM REPORT: CPT

## 2019-06-06 PROCEDURE — 93306 TTE W/DOPPLER COMPLETE: CPT | Mod: 26

## 2019-06-06 PROCEDURE — 99232 SBSQ HOSP IP/OBS MODERATE 35: CPT

## 2019-06-06 RX ORDER — SODIUM CHLORIDE 9 MG/ML
3 INJECTION INTRAMUSCULAR; INTRAVENOUS; SUBCUTANEOUS EVERY 8 HOURS
Refills: 0 | Status: DISCONTINUED | OUTPATIENT
Start: 2019-06-06 | End: 2019-06-07

## 2019-06-06 RX ORDER — SORBITOL SOLUTION 70 %
30 SOLUTION, ORAL MISCELLANEOUS ONCE
Refills: 0 | Status: DISCONTINUED | OUTPATIENT
Start: 2019-06-06 | End: 2019-06-07

## 2019-06-06 RX ADMIN — Medication 100 MILLIGRAM(S): at 22:24

## 2019-06-06 RX ADMIN — SIMVASTATIN 20 MILLIGRAM(S): 20 TABLET, FILM COATED ORAL at 22:24

## 2019-06-06 RX ADMIN — GABAPENTIN 600 MILLIGRAM(S): 400 CAPSULE ORAL at 13:16

## 2019-06-06 RX ADMIN — CLOPIDOGREL BISULFATE 75 MILLIGRAM(S): 75 TABLET, FILM COATED ORAL at 13:16

## 2019-06-06 RX ADMIN — TRAMADOL HYDROCHLORIDE 50 MILLIGRAM(S): 50 TABLET ORAL at 22:24

## 2019-06-06 RX ADMIN — SODIUM CHLORIDE 3 MILLILITER(S): 9 INJECTION INTRAMUSCULAR; INTRAVENOUS; SUBCUTANEOUS at 22:17

## 2019-06-06 RX ADMIN — Medication 100 MILLIGRAM(S): at 13:16

## 2019-06-06 RX ADMIN — Medication 100 MILLIGRAM(S): at 05:05

## 2019-06-06 RX ADMIN — TRAMADOL HYDROCHLORIDE 50 MILLIGRAM(S): 50 TABLET ORAL at 23:09

## 2019-06-06 RX ADMIN — GABAPENTIN 600 MILLIGRAM(S): 400 CAPSULE ORAL at 05:05

## 2019-06-06 RX ADMIN — CITALOPRAM 20 MILLIGRAM(S): 10 TABLET, FILM COATED ORAL at 16:15

## 2019-06-06 RX ADMIN — PANTOPRAZOLE SODIUM 40 MILLIGRAM(S): 20 TABLET, DELAYED RELEASE ORAL at 05:05

## 2019-06-06 RX ADMIN — SODIUM CHLORIDE 3 MILLILITER(S): 9 INJECTION INTRAMUSCULAR; INTRAVENOUS; SUBCUTANEOUS at 16:12

## 2019-06-06 RX ADMIN — Medication 81 MILLIGRAM(S): at 13:16

## 2019-06-06 NOTE — PROGRESS NOTE ADULT - SUBJECTIVE AND OBJECTIVE BOX
Structural Heart Team      HPI:  83 year old female with PMH of former smoker, COPD, pulm nodule, AS, GERD, gastric ulcer -s/p partial gastrectomy 1966, spinal stenosis s/p fusion 2017  complicated by PE -s/p AC for 1 year (discontinued 9 months ago), pre DM and osteoporosis. Patient reports known AS followed by Dr Paris. Developed progressive BROCK, fatigue and intermittent dizziness over the past year. Evaluated by Dr Paris where repeat ECHO shows severe AS .    This morning Ms Garcia has no complaints and says she feels much better than last night.  She denies cp, sob, and reports being hungry.    REVIEW OF SYSTEMS:    CONSTITUTIONAL: No weakness, fevers or chills  EYES/ENT: No visual changes;  No vertigo or throat pain   NECK: No pain or stiffness  RESPIRATORY: No cough, wheezing, hemoptysis; No shortness of breath  CARDIOVASCULAR: No chest pain or palpitations  GASTROINTESTINAL: No abdominal or epigastric pain. No nausea, vomiting, or hematemesis; No diarrhea or constipation. No melena or hematochezia.  GENITOURINARY: No dysuria, frequency or hematuria  NEUROLOGICAL: No numbness or weakness  SKIN: No itching, rashes      Allergies    contrast media (gadolinium-based) (Other)  Keflex (Unknown)  penicillin (Rash)  Xanax (Other)    Intolerances      Vital Signs Last 24 Hrs  T(C): 36.6 (06 Jun 2019 04:00), Max: 36.8 (05 Jun 2019 11:58)  T(F): 97.9 (06 Jun 2019 04:00), Max: 98.3 (05 Jun 2019 17:20)  HR: 53 (06 Jun 2019 08:00) (53 - 75)  BP: 92/50 (06 Jun 2019 08:00) (92/50 - 154/69)  BP(mean): 67 (06 Jun 2019 08:00) (66 - 99)  RR: 10 (06 Jun 2019 08:00) (10 - 30)  SpO2: 97% (06 Jun 2019 08:00) (94% - 97%)    MEDICATIONS  (STANDING):  aspirin enteric coated 81 milliGRAM(s) Oral daily  citalopram 20 milliGRAM(s) Oral daily  clindamycin IVPB 900 milliGRAM(s) IV Intermittent once  clopidogrel Tablet 75 milliGRAM(s) Oral daily  docusate sodium 100 milliGRAM(s) Oral three times a day  gabapentin 600 milliGRAM(s) Oral three times a day  pantoprazole    Tablet 40 milliGRAM(s) Oral before breakfast  simvastatin 20 milliGRAM(s) Oral at bedtime  sodium chloride 0.9% lock flush 3 milliLiter(s) IV Push every 8 hours  traMADol 50 milliGRAM(s) Oral at bedtime      Exam-  General: NAD  Cor: s1s2, RRR, no murmurs  EKG: NSR  Pulm: clear, no wheezes, rales, rhonchi b/l  Gastointestinal: soft, nontender, nondistended, +bowel sounds  Extremities: no edema, 1+ PT/DP pulses b/l  Groin: R- soft, dry  L- dressing intact, clean and dry  Neuro: A&Ox3, nonfocal                          11.2   11.2  )-----------( 194      ( 06 Jun 2019 03:17 )             33.0   06-06    137  |  104  |  25<H>  ----------------------------<  161<H>  4.2   |  24  |  0.80    Ca    8.2<L>      06 Jun 2019 03:17  Phos  4.5     06-06  Mg     2.0     06-06    TPro  5.4<L>  /  Alb  3.2<L>  /  TBili  0.4  /  DBili  x   /  AST  16  /  ALT  17  /  AlkPhos  55  06-06  PT/INR - ( 06 Jun 2019 03:17 )   PT: 13.5 sec;   INR: 1.18 ratio         PTT - ( 06 Jun 2019 03:17 )  PTT:32.2 sec  I&O's Summary    05 Jun 2019 07:01  -  06 Jun 2019 07:00  --------------------------------------------------------  IN: 210 mL / OUT: 350 mL / NET: -140 mL              Assessment/Plan:  84 y/o female POD 1 s/p TF TAVR (#26 CV) for severe symptomatic aortic stenosis, complicated by R groin bleed.  ·	R groin stable  ·	transfer to [address] [place] [address]  ·	OOB and ambulate as tolerated  ·	will need post TAVR echo today  ·	on ASA and Plavix  ·	had LBBB last night (EKG @17:00), which resolved (EKG @midnight)  ·	avoid Bblockers for now  ·	possible d/c home tomorrow    - Discharge plan: follow up with Dr. Gill in one week and follow up with Structural Heart Team in one month.  Echo will be done at 1 month follow up visit.      BRAD Dean  [phone] Structural Heart Team      HPI:  83 year old female with PMH of former smoker, COPD, pulm nodule, AS, GERD, gastric ulcer -s/p partial gastrectomy 1966, spinal stenosis s/p fusion 2017  complicated by PE -s/p AC for 1 year (discontinued 9 months ago), pre DM and osteoporosis. Patient reports known AS followed by Dr Paris. Developed progressive BROCK, fatigue and intermittent dizziness over the past year. Evaluated by Dr Paris where repeat ECHO shows severe AS .    This morning Ms Garcia has no complaints and says she feels much better than last night.  She denies cp, sob, and reports being hungry.    REVIEW OF SYSTEMS:    CONSTITUTIONAL: No weakness, fevers or chills  EYES/ENT: No visual changes;  No vertigo or throat pain   NECK: No pain or stiffness  RESPIRATORY: No cough, wheezing, hemoptysis; No shortness of breath  CARDIOVASCULAR: No chest pain or palpitations  GASTROINTESTINAL: No abdominal or epigastric pain. No nausea, vomiting, or hematemesis; No diarrhea or constipation. No melena or hematochezia.  GENITOURINARY: No dysuria, frequency or hematuria  NEUROLOGICAL: No numbness or weakness  SKIN: No itching, rashes      Allergies    contrast media (gadolinium-based) (Other)  Keflex (Unknown)  penicillin (Rash)  Xanax (Other)    Intolerances      Vital Signs Last 24 Hrs  T(C): 36.6 (06 Jun 2019 04:00), Max: 36.8 (05 Jun 2019 11:58)  T(F): 97.9 (06 Jun 2019 04:00), Max: 98.3 (05 Jun 2019 17:20)  HR: 53 (06 Jun 2019 08:00) (53 - 75)  BP: 92/50 (06 Jun 2019 08:00) (92/50 - 154/69)  BP(mean): 67 (06 Jun 2019 08:00) (66 - 99)  RR: 10 (06 Jun 2019 08:00) (10 - 30)  SpO2: 97% (06 Jun 2019 08:00) (94% - 97%)    MEDICATIONS  (STANDING):  aspirin enteric coated 81 milliGRAM(s) Oral daily  citalopram 20 milliGRAM(s) Oral daily  clindamycin IVPB 900 milliGRAM(s) IV Intermittent once  clopidogrel Tablet 75 milliGRAM(s) Oral daily  docusate sodium 100 milliGRAM(s) Oral three times a day  gabapentin 600 milliGRAM(s) Oral three times a day  pantoprazole    Tablet 40 milliGRAM(s) Oral before breakfast  simvastatin 20 milliGRAM(s) Oral at bedtime  sodium chloride 0.9% lock flush 3 milliLiter(s) IV Push every 8 hours  traMADol 50 milliGRAM(s) Oral at bedtime      Exam-  General: NAD  Cor: s1s2, RRR, no murmurs  EKG: NSR  Pulm: clear, no wheezes, rales, rhonchi b/l  Gastointestinal: soft, nontender, nondistended, +bowel sounds  Extremities: no edema, 1+ PT/DP pulses b/l  Groin: R- soft, dry  L- dressing intact, clean and dry  Neuro: A&Ox3, nonfocal                          11.2   11.2  )-----------( 194      ( 06 Jun 2019 03:17 )             33.0   06-06    137  |  104  |  25<H>  ----------------------------<  161<H>  4.2   |  24  |  0.80    Ca    8.2<L>      06 Jun 2019 03:17  Phos  4.5     06-06  Mg     2.0     06-06    TPro  5.4<L>  /  Alb  3.2<L>  /  TBili  0.4  /  DBili  x   /  AST  16  /  ALT  17  /  AlkPhos  55  06-06  PT/INR - ( 06 Jun 2019 03:17 )   PT: 13.5 sec;   INR: 1.18 ratio         PTT - ( 06 Jun 2019 03:17 )  PTT:32.2 sec  I&O's Summary    05 Jun 2019 07:01  -  06 Jun 2019 07:00  --------------------------------------------------------  IN: 210 mL / OUT: 350 mL / NET: -140 mL              Assessment/Plan:  84 y/o female POD 1 s/p TF TAVR (#26 CV) for severe symptomatic aortic stenosis, complicated by R groin bleed.  ·	R groin stable  ·	transfer to [address] [place] [address]	O and ambulate as tolerated  ·	will need post TAVR echo today  ·	on ASA and Plavix  ·	had LBBB last night (EKG @17:00), which resolved (EKG @midnight)  ·	will d/c home with a monitoring patch  ·	ekg in am  ·	avoid Bblockers for now  ·	possible d/c home tomorrow    - Discharge plan: follow up with Dr. Gill in one week and follow up with Structural Heart Team in one month.  Echo will be done at 1 month follow up visit.      BRAD Dean  [phone]

## 2019-06-06 NOTE — PROGRESS NOTE ADULT - ASSESSMENT
this is a 83 year old female with PMH of former smoker, COPD, pulm nodule, AS, GERD, gastric ulcer -s/p partial gastrectomy 1966, spinal stenosis s/p fusion 2017  complicated by PE -s/p AC for 1 year (discontinued 9 months ago), pre DM and osteoporosis. Patient reports known AS followed by Dr Paris. Developed progressive BROCK, fatigue and intermittent dizziness over the past year. Evaluated by Dr Paris where repeat ECHO shows severe AS .   6/5 Underwent  R transfemoral TAVR, #26 Core Evolut R and R CFA PTA for post op stenosis of R CFA  LBB on EKG  6/6 Stabilized  transferred to [address] [place] [address] seen and examined  in company of Dr Gill to be discharge  in am  with Micra patch.  Echo pending. BB  on hold this is a 83 year old female with PMH of former smoker, COPD, pulm nodule, AS, GERD, gastric ulcer -s/p partial gastrectomy 1966, spinal stenosis s/p fusion 2017  complicated by PE -s/p AC for 1 year (discontinued 9 months ago), pre DM and osteoporosis. Patient reports known AS followed by Dr Paris. Developed progressive BROCK, fatigue and intermittent dizziness over the past year. Evaluated by Dr Paris where repeat ECHO shows severe AS .   6/5 Underwent  R transfemoral TAVR, #26 Core Evolut R and R CFA PTA for post op stenosis of R CFA  LBB on EKG  6/6 Stabilized  transferred to [address] [place] [address] seen and examined  in company of Dr Gill to be discharge  in am  with Micra patch.  Echo pending. BB  on hold.

## 2019-06-06 NOTE — PROGRESS NOTE ADULT - SUBJECTIVE AND OBJECTIVE BOX
Subjective  ' Hello OOB in chair no acute distress"    VITAL SIGNS  Telemetry:  Sinus konstantin 57    Vital Signs Last 24 Hrs  T(C): 36.4 (19 @ 15:33), Max: 36.8 (19 @ 17:20)  T(F): 97.6 (19 @ 15:33), Max: 98.3 (19 @ 17:20)  HR: 57 (19 @ 15:33) (53 - 77)  BP: 108/71 (19 @ 15:33) (92/50 - 154/69)  RR: 20 (19 @ 15:33) (10 - 32)  SpO2: 96% (19 @ 15:33) (94% - 98%)            @ 07:01  -   @ 07:00  --------------------------------------------------------  IN: 210 mL / OUT: 350 mL / NET: -140 mL     @ 07:01  -   @ 15:51  --------------------------------------------------------  IN: 600 mL / OUT: 200 mL / NET: 400 mL    Daily Weight in k.2 (2019 00:00)  MEDICATIONS  (STANDING):  aspirin enteric coated 81 milliGRAM(s) Oral daily  citalopram 20 milliGRAM(s) Oral daily  clindamycin IVPB 900 milliGRAM(s) IV Intermittent once  clopidogrel Tablet 75 milliGRAM(s) Oral daily  docusate sodium 100 milliGRAM(s) Oral three times a day  gabapentin 600 milliGRAM(s) Oral three times a day  pantoprazole    Tablet 40 milliGRAM(s) Oral before breakfast  simvastatin 20 milliGRAM(s) Oral at bedtime  sodium chloride 0.9% lock flush 3 milliLiter(s) IV Push every 8 hours  traMADol 50 milliGRAM(s) Oral at bedtime    MEDICATIONS  (PRN):  acetaminophen   Tablet .. 650 milliGRAM(s) Oral every 6 hours PRN Mild Pain (1 - 3), Moderate Pain (4 - 6)    PHYSICAL EXAM  Neurology: alert and oriented x 3, nonfocal, no gross deficits    CV : S1 S2 RRR  Lungs: B/l CTA  Abdomen: soft, nontender, nondistended, positive bowel sounds, last bowel movement  preop  :   Voids          Extremities:  REight  groin benign DARREL    no hematoma non tender  left groin with dressing CDI nontender  B/lle + DP pulses                                      Physical Therapy Rec:   Home  [x  ]   Home w/ PT  [  ]  Rehab  [  ]    Discussed with Cardiothoracic Team at AM rounds. Subjective  ' Hello OOB in chair no acute distress"    VITAL SIGNS  Telemetry:  Sinus konstantin 57    Vital Signs Last 24 Hrs  T(C): 36.4 (19 @ 15:33), Max: 36.8 (19 @ 17:20)  T(F): 97.6 (19 @ 15:33), Max: 98.3 (19 @ 17:20)  HR: 57 (19 @ 15:33) (53 - 77)  BP: 108/71 (19 @ 15:33) (92/50 - 154/69)  RR: 20 (19 @ 15:33) (10 - 32)  SpO2: 96% (19 @ 15:33) (94% - 98%)            @ 07:01  -   @ 07:00  --------------------------------------------------------  IN: 210 mL / OUT: 350 mL / NET: -140 mL     @ 07:01  -   @ 15:51  --------------------------------------------------------  IN: 600 mL / OUT: 200 mL / NET: 400 mL    Daily Weight in k.2 (2019 00:00)  MEDICATIONS  (STANDING):  aspirin enteric coated 81 milliGRAM(s) Oral daily  citalopram 20 milliGRAM(s) Oral daily  clindamycin IVPB 900 milliGRAM(s) IV Intermittent once  clopidogrel Tablet 75 milliGRAM(s) Oral daily  docusate sodium 100 milliGRAM(s) Oral three times a day  gabapentin 600 milliGRAM(s) Oral three times a day  pantoprazole    Tablet 40 milliGRAM(s) Oral before breakfast  simvastatin 20 milliGRAM(s) Oral at bedtime  sodium chloride 0.9% lock flush 3 milliLiter(s) IV Push every 8 hours  traMADol 50 milliGRAM(s) Oral at bedtime    MEDICATIONS  (PRN):  acetaminophen   Tablet .. 650 milliGRAM(s) Oral every 6 hours PRN Mild Pain (1 - 3), Moderate Pain (4 - 6)    PHYSICAL EXAM  Neurology: alert and oriented x 3, nonfocal, no gross deficits    CV : S1 S2 RRR  Lungs: B/l CTA  Abdomen: soft, nontender, nondistended, positive bowel sounds, last bowel movement  preop  :   Voids          Extremities:  Right  groin benign DARREL    no hematoma non tender  left groin with dressing CDI nontender  B/lle + DP pulses                                      Physical Therapy Rec:   Home  [x  ]   Home w/ PT  [  ]  Rehab  [  ]    Discussed with Cardiothoracic Team at AM rounds.

## 2019-06-07 ENCOUNTER — TRANSCRIPTION ENCOUNTER (OUTPATIENT)
Age: 84
End: 2019-06-07

## 2019-06-07 VITALS — WEIGHT: 115.3 LBS

## 2019-06-07 LAB
ANION GAP SERPL CALC-SCNC: 10 MMOL/L — SIGNIFICANT CHANGE UP (ref 5–17)
BASOPHILS # BLD AUTO: 0 K/UL — SIGNIFICANT CHANGE UP (ref 0–0.2)
BASOPHILS NFR BLD AUTO: 0.1 % — SIGNIFICANT CHANGE UP (ref 0–2)
BUN SERPL-MCNC: 31 MG/DL — HIGH (ref 7–23)
CALCIUM SERPL-MCNC: 8.5 MG/DL — SIGNIFICANT CHANGE UP (ref 8.4–10.5)
CHLORIDE SERPL-SCNC: 102 MMOL/L — SIGNIFICANT CHANGE UP (ref 96–108)
CO2 SERPL-SCNC: 25 MMOL/L — SIGNIFICANT CHANGE UP (ref 22–31)
CREAT SERPL-MCNC: 0.99 MG/DL — SIGNIFICANT CHANGE UP (ref 0.5–1.3)
EOSINOPHIL # BLD AUTO: 0.1 K/UL — SIGNIFICANT CHANGE UP (ref 0–0.5)
EOSINOPHIL NFR BLD AUTO: 1.1 % — SIGNIFICANT CHANGE UP (ref 0–6)
GLUCOSE SERPL-MCNC: 104 MG/DL — HIGH (ref 70–99)
HCT VFR BLD CALC: 34.9 % — SIGNIFICANT CHANGE UP (ref 34.5–45)
HGB BLD-MCNC: 11.6 G/DL — SIGNIFICANT CHANGE UP (ref 11.5–15.5)
LYMPHOCYTES # BLD AUTO: 1.5 K/UL — SIGNIFICANT CHANGE UP (ref 1–3.3)
LYMPHOCYTES # BLD AUTO: 16.5 % — SIGNIFICANT CHANGE UP (ref 13–44)
MCHC RBC-ENTMCNC: 31.2 PG — SIGNIFICANT CHANGE UP (ref 27–34)
MCHC RBC-ENTMCNC: 33.3 GM/DL — SIGNIFICANT CHANGE UP (ref 32–36)
MCV RBC AUTO: 93.7 FL — SIGNIFICANT CHANGE UP (ref 80–100)
MONOCYTES # BLD AUTO: 1.1 K/UL — HIGH (ref 0–0.9)
MONOCYTES NFR BLD AUTO: 11.9 % — SIGNIFICANT CHANGE UP (ref 2–14)
NEUTROPHILS # BLD AUTO: 6.4 K/UL — SIGNIFICANT CHANGE UP (ref 1.8–7.4)
NEUTROPHILS NFR BLD AUTO: 70.4 % — SIGNIFICANT CHANGE UP (ref 43–77)
PLATELET # BLD AUTO: 178 K/UL — SIGNIFICANT CHANGE UP (ref 150–400)
POTASSIUM SERPL-MCNC: 4.5 MMOL/L — SIGNIFICANT CHANGE UP (ref 3.5–5.3)
POTASSIUM SERPL-SCNC: 4.5 MMOL/L — SIGNIFICANT CHANGE UP (ref 3.5–5.3)
RBC # BLD: 3.73 M/UL — LOW (ref 3.8–5.2)
RBC # FLD: 13.1 % — SIGNIFICANT CHANGE UP (ref 10.3–14.5)
SODIUM SERPL-SCNC: 137 MMOL/L — SIGNIFICANT CHANGE UP (ref 135–145)
WBC # BLD: 9.1 K/UL — SIGNIFICANT CHANGE UP (ref 3.8–10.5)
WBC # FLD AUTO: 9.1 K/UL — SIGNIFICANT CHANGE UP (ref 3.8–10.5)

## 2019-06-07 PROCEDURE — 93306 TTE W/DOPPLER COMPLETE: CPT

## 2019-06-07 PROCEDURE — C1769: CPT

## 2019-06-07 PROCEDURE — 80053 COMPREHEN METABOLIC PANEL: CPT

## 2019-06-07 PROCEDURE — C1887: CPT

## 2019-06-07 PROCEDURE — C1889: CPT

## 2019-06-07 PROCEDURE — 71045 X-RAY EXAM CHEST 1 VIEW: CPT | Mod: 26

## 2019-06-07 PROCEDURE — 83735 ASSAY OF MAGNESIUM: CPT

## 2019-06-07 PROCEDURE — 71045 X-RAY EXAM CHEST 1 VIEW: CPT

## 2019-06-07 PROCEDURE — C1894: CPT

## 2019-06-07 PROCEDURE — 85730 THROMBOPLASTIN TIME PARTIAL: CPT

## 2019-06-07 PROCEDURE — 85610 PROTHROMBIN TIME: CPT

## 2019-06-07 PROCEDURE — 86850 RBC ANTIBODY SCREEN: CPT

## 2019-06-07 PROCEDURE — 93010 ELECTROCARDIOGRAM REPORT: CPT

## 2019-06-07 PROCEDURE — 80048 BASIC METABOLIC PNL TOTAL CA: CPT

## 2019-06-07 PROCEDURE — 99239 HOSP IP/OBS DSCHRG MGMT >30: CPT | Mod: 24

## 2019-06-07 PROCEDURE — 85384 FIBRINOGEN ACTIVITY: CPT

## 2019-06-07 PROCEDURE — 86900 BLOOD TYPING SEROLOGIC ABO: CPT

## 2019-06-07 PROCEDURE — 82962 GLUCOSE BLOOD TEST: CPT

## 2019-06-07 PROCEDURE — 93005 ELECTROCARDIOGRAM TRACING: CPT

## 2019-06-07 PROCEDURE — C1725: CPT

## 2019-06-07 PROCEDURE — 86870 RBC ANTIBODY IDENTIFICATION: CPT

## 2019-06-07 PROCEDURE — 84100 ASSAY OF PHOSPHORUS: CPT

## 2019-06-07 PROCEDURE — P9016: CPT

## 2019-06-07 PROCEDURE — 76000 FLUOROSCOPY <1 HR PHYS/QHP: CPT

## 2019-06-07 PROCEDURE — 86902 BLOOD TYPE ANTIGEN DONOR EA: CPT

## 2019-06-07 PROCEDURE — 86901 BLOOD TYPING SEROLOGIC RH(D): CPT

## 2019-06-07 PROCEDURE — C1760: CPT

## 2019-06-07 PROCEDURE — 85027 COMPLETE CBC AUTOMATED: CPT

## 2019-06-07 PROCEDURE — P9047: CPT

## 2019-06-07 PROCEDURE — 86922 COMPATIBILITY TEST ANTIGLOB: CPT

## 2019-06-07 RX ORDER — METOPROLOL TARTRATE 50 MG
1 TABLET ORAL
Qty: 0 | Refills: 0 | DISCHARGE

## 2019-06-07 RX ORDER — METOPROLOL SUCCINATE 25 MG/1
25 TABLET, EXTENDED RELEASE ORAL
Qty: 30 | Refills: 0 | Status: COMPLETED | COMMUNITY
Start: 2017-07-27 | End: 2019-06-07

## 2019-06-07 RX ORDER — CLOPIDOGREL BISULFATE 75 MG/1
1 TABLET, FILM COATED ORAL
Qty: 30 | Refills: 0
Start: 2019-06-07 | End: 2019-07-06

## 2019-06-07 RX ORDER — PREDNISONE 50 MG/1
50 TABLET ORAL
Qty: 3 | Refills: 2 | Status: COMPLETED | COMMUNITY
Start: 2019-04-30 | End: 2019-06-07

## 2019-06-07 RX ORDER — DOCUSATE SODIUM 100 MG
1 CAPSULE ORAL
Qty: 90 | Refills: 0
Start: 2019-06-07 | End: 2019-07-06

## 2019-06-07 RX ORDER — ACETAMINOPHEN 500 MG
2 TABLET ORAL
Qty: 0 | Refills: 0 | DISCHARGE
Start: 2019-06-07

## 2019-06-07 RX ADMIN — Medication 81 MILLIGRAM(S): at 11:10

## 2019-06-07 RX ADMIN — SODIUM CHLORIDE 3 MILLILITER(S): 9 INJECTION INTRAMUSCULAR; INTRAVENOUS; SUBCUTANEOUS at 05:46

## 2019-06-07 RX ADMIN — Medication 100 MILLIGRAM(S): at 13:00

## 2019-06-07 RX ADMIN — SODIUM CHLORIDE 3 MILLILITER(S): 9 INJECTION INTRAMUSCULAR; INTRAVENOUS; SUBCUTANEOUS at 12:56

## 2019-06-07 RX ADMIN — GABAPENTIN 600 MILLIGRAM(S): 400 CAPSULE ORAL at 05:47

## 2019-06-07 RX ADMIN — CLOPIDOGREL BISULFATE 75 MILLIGRAM(S): 75 TABLET, FILM COATED ORAL at 11:10

## 2019-06-07 RX ADMIN — PANTOPRAZOLE SODIUM 40 MILLIGRAM(S): 20 TABLET, DELAYED RELEASE ORAL at 05:48

## 2019-06-07 RX ADMIN — Medication 100 MILLIGRAM(S): at 05:48

## 2019-06-07 RX ADMIN — GABAPENTIN 600 MILLIGRAM(S): 400 CAPSULE ORAL at 13:00

## 2019-06-07 RX ADMIN — GABAPENTIN 600 MILLIGRAM(S): 400 CAPSULE ORAL at 05:48

## 2019-06-07 RX ADMIN — CITALOPRAM 20 MILLIGRAM(S): 10 TABLET, FILM COATED ORAL at 11:10

## 2019-06-07 NOTE — DISCHARGE NOTE PROVIDER - CARE PROVIDERS DIRECT ADDRESSES
[email or web address]

## 2019-06-07 NOTE — DISCHARGE NOTE NURSING/CASE MANAGEMENT/SOCIAL WORK - NSDCPNDISPN_GEN_ALL_CORE
Safe use, storage and disposal of opioids when prescribed/Activities of daily living, including home environment that might     exacerbate pain or reduce effectiveness of the pain management plan of care as well as strategies to address these issues/Side effects of pain management treatment

## 2019-06-07 NOTE — DISCHARGE NOTE PROVIDER - CARE PROVIDER_API CALL
Franklin Gill (MD)  Thoracic and Cardiac Surgery  [address] [place] [address]  Phone: [phone]  Fax: [phone]  Follow Up Time:     Maurilio Evans)  Cardiovascular Disease  [address] [place] [address]  Phone: [phone]  Fax: [phone]  Follow Up Time: Franklin Gill (MD)  Thoracic and Cardiac Surgery  [address] [place] [address]  Phone: [phone]  Fax: [phone]  Follow Up Time:     Maurilio Evans)  Cardiovascular Disease  [address] [place] [address]  Phone: [phone]  Fax: [phone]  Follow Up Time: 1 month    Kendall Perdue)  Cardiovascular Disease; Internal Medicine  [address] [place] [address]  Phone: [phone]  Fax: [phone]  Follow Up Time: 2 weeks

## 2019-06-07 NOTE — DISCHARGE NOTE PROVIDER - NSDCCPCAREPLAN_GEN_ALL_CORE_FT
PRINCIPAL DISCHARGE DIAGNOSIS  Diagnosis: S/p TAVR (transcatheter aortic valve replacement), bioprosthetic  Assessment and Plan of Treatment: 1. Daily Shower  2. Weight yourself daily and notify any weight gain greater than 2-3 pounds in 24 hours.  3. Regular DASH diet - low fat, low cholesterol, no added salt.  4. Cleanse both groin incision daily while showering with warm water and mild soap, pat dry and maintain open to air.   5. Follow Post op TAVR Do's and Don'ts discharge instructions.   6. No heavy lifting nothing greater than 5 pounds until cleared by MD.   8. Call / Notify MD any fever greater than 101.0  9. Increase Activity as tolerated.   10. Notify MD and Dentist the need of antibiotic prophylaxis before any dental procedure to prevent infection of your new valve.

## 2019-06-07 NOTE — DISCHARGE NOTE PROVIDER - HOSPITAL COURSE
83 year old female with PMH of former smoker, COPD, pulmonary nodule, AS, GERD, gastric ulcer, s/p partial gastrectomy 1966, spinal stenosis s/p fusion 2017  complicated by PE -s/p AC for 1 year (discontinued 9 months ago), pre DM and osteoporosis. Patient reports known AS followed by Dr. Gamble. Developed progressive BROCK, fatigue and intermittent dizziness over the past year. Evaluated by Dr. Gamble where ECHO repeat shows severe AS.    On 6/5 s/p Right transfemoral TAVR, #26 Core Evolut R and R CFA PTA for post op stenosis of R CFA  LBB on EKG    6/6 Stabilized transferred to [address] [place] [address] seen and examined in company of Dr. Gill to be discharge in am with Micra patch. BB on hold. Post op TTE revealed: s/p #26mm Evolut R TAVR. Peak/mean gradients=10/6 mmHg with a DVI= 0.64.  There is mild(1+) aortic regurgitation.  There is a trivial    intravalvular regurgitation jet and a trivial jet originating from the anterior aspect of the TAVR.    6/7 VVS; LBBB --> Off AV nodals.  Discharged home with cardiac monitor patch. 83 year old female with PMH of former smoker, COPD, pulmonary nodule, AS, GERD, gastric ulcer, s/p partial gastrectomy 1966, spinal stenosis s/p fusion 2017  complicated by PE -s/p AC for 1 year (discontinued 9 months ago), pre DM and osteoporosis. Patient reports known AS followed by Dr. Mei. Developed progressive BROCK, fatigue and intermittent dizziness over the past year. Evaluated by Dr. Mei where ECHO repeat shows severe AS.    On 6/5 s/p Right transfemoral TAVR, #26 Core Evolut R and R CFA PTA for post op stenosis of R CFA  LBB on EKG    6/6 Stabilized transferred to [address] [place] [address] seen and examined in company of Dr. Gill to be discharge in am with Micra patch. BB on hold. Post op TTE revealed: s/p #26mm Evolut R TAVR. Peak/mean gradients=10/6 mmHg with a DVI= 0.64.  There is mild(1+) aortic regurgitation.  There is a trivial    intravalvular regurgitation jet and a trivial jet originating from the anterior aspect of the TAVR.    6/7 VVS; NSR 60-80 No LBBB --> Off AV nodals.  Discharged home with cardiac monitor patch.

## 2019-06-07 NOTE — DISCHARGE NOTE PROVIDER - PROVIDER TOKENS
PROVIDER:[TOKEN:[[id number]]],PROVIDER:[TOKEN:[[id number]]] PROVIDER:[TOKEN:[[id number]]],PROVIDER:[TOKEN:[[id number]],FOLLOWUP:[1 month]],PROVIDER:[TOKEN:[[id number]],FOLLOWUP:[2 weeks]]

## 2019-06-07 NOTE — PROGRESS NOTE ADULT - SUBJECTIVE AND OBJECTIVE BOX
Structural Heart Team    Ms Garcia says she's feeling better today.  She has no complaints and there were no acute events overnight.    REVIEW OF SYSTEMS:    CONSTITUTIONAL: No weakness, fevers or chills  EYES/ENT: No visual changes;  No vertigo or throat pain   NECK: No pain or stiffness  RESPIRATORY: No cough, wheezing, hemoptysis; No shortness of breath  CARDIOVASCULAR: No chest pain or palpitations  GASTROINTESTINAL: No abdominal or epigastric pain. No nausea, vomiting, or hematemesis; No diarrhea or constipation. No melena or hematochezia.  GENITOURINARY: No dysuria, frequency or hematuria  NEUROLOGICAL: No numbness or weakness  SKIN: No itching, rashes      Allergies    contrast media (gadolinium-based) (Other)  Keflex (Unknown)  penicillin (Rash)  Xanax (Other)    Intolerances      Vital Signs Last 24 Hrs  T(C): 36.6 (07 Jun 2019 05:00), Max: 36.7 (06 Jun 2019 12:00)  T(F): 97.9 (07 Jun 2019 05:00), Max: 98 (06 Jun 2019 12:00)  HR: 62 (07 Jun 2019 05:00) (57 - 77)  BP: 112/74 (07 Jun 2019 05:00) (100/57 - 112/74)  BP(mean): 71 (06 Jun 2019 14:00) (71 - 77)  RR: 18 (07 Jun 2019 05:00) (16 - 30)  SpO2: 98% (07 Jun 2019 05:00) (96% - 98%)    MEDICATIONS  (STANDING):  aspirin enteric coated 81 milliGRAM(s) Oral daily  citalopram 20 milliGRAM(s) Oral daily  clindamycin IVPB 900 milliGRAM(s) IV Intermittent once  clopidogrel Tablet 75 milliGRAM(s) Oral daily  docusate sodium 100 milliGRAM(s) Oral three times a day  gabapentin 600 milliGRAM(s) Oral three times a day  pantoprazole    Tablet 40 milliGRAM(s) Oral before breakfast  simvastatin 20 milliGRAM(s) Oral at bedtime  sodium chloride 0.9% lock flush 3 milliLiter(s) IV Push every 8 hours  sorbitol 70% Solution 30 milliLiter(s) Oral once  traMADol 50 milliGRAM(s) Oral at bedtime      Exam-  General: NAD  Cor: s1s2, RRR, no murmurs  EKG: NSR  Pulm: clear, no wheezes, rales, rhonchi b/l  Gastointestinal: soft, nontender, nondistended, +bowel sounds  Extremities: no edema, 1+ PT/DP pulses b/l  Groin: R- soft, dry  L- dressing intact, clean and dry  Neuro: A&Ox3, nonfocal                          11.6   9.1   )-----------( 178      ( 07 Jun 2019 06:41 )             34.9   06-07    137  |  102  |  31<H>  ----------------------------<  104<H>  4.5   |  25  |  0.99    Ca    8.5      07 Jun 2019 06:37  Phos  4.5     06-06  Mg     2.0     06-06    TPro  5.4<L>  /  Alb  3.2<L>  /  TBili  0.4  /  DBili  x   /  AST  16  /  ALT  17  /  AlkPhos  55  06-06  PT/INR - ( 06 Jun 2019 03:17 )   PT: 13.5 sec;   INR: 1.18 ratio         PTT - ( 06 Jun 2019 03:17 )  PTT:32.2 sec  I&O's Summary    06 Jun 2019 07:01  -  07 Jun 2019 07:00  --------------------------------------------------------  IN: 840 mL / OUT: 200 mL / NET: 640 mL    07 Jun 2019 07:01  -  07 Jun 2019 10:23  --------------------------------------------------------  IN: 240 mL / OUT: 0 mL / NET: 240 mL    < from: Transthoracic Echocardiogram (06.06.19 @ 11:22) >  1. There is mitral annular calcification and thickening and  restriction of the mitral valve leaflets.   There is at  least moderate-severe(3+)  mitral regurgitation.  The  entire regurgitation jet is not well seen.  The jet is  eccentric and posteriorly directed. The peak/mean  transmitral gradients= 10/3mmHg (HR= 59bpm)  2.  Patient is s/p #26mm EvolutR TAVR.  Peak/mean  gradients=10/6 mmHg with a DVI= 0.64.  There is mild(1+)  aortic regurgitation.  There is a trivial intravalvular  regurgitation jet and a trivial jet originating from the  anterior aspect of the TAVR.  3. Theleft atrium is severely dilated.  4. Normal left ventricular internal dimensions and wall  thicknesses.  There is a basal sigmoid septum.  5. Normal left ventricular systolic function. No segmental  wall motion abnormalities.  The LVEF= 60-65%.  6. Normal right ventricular size and function.  7. The IVC is normal in size with less than 50% variation  with sniff.  8. Estimated right ventricular systolic pressure equals 41  mm Hg, assuming right atrial pressure equals 10 mm Hg,  consistent with mild pulmonary hypertension.  9.  There is no pericardial effusion.  *** Findings are similar to the post-TAVR TTE from  yesterday.    < end of copied text >            Assessment/Plan:  84 y/o female POD 2 s/p TF TAVR (#26 CV) for severe symptomatic aortic stenosis, complicated by R groin bleed, with transient LBBB POD 0.  - d/c home today   - monitoring patch to be placed prior to discharge, will wear for 2 weeks  - echo done, results noted  - ASA/Plavix  - discussed plan with patient  - Discharge plan: follow up with Dr. Gill in one week and follow up with Structural Heart Team in one month.  Echo will be done at 1 month follow up visit.      BRAD Dean  [phone]

## 2019-06-07 NOTE — DISCHARGE NOTE PROVIDER - NSDCFUADDAPPT_GEN_ALL_CORE_FT
referral forwarded to [facility] for visiting nurse. A nurse will call you the day after discharge to schedule your appointment. 1. Follow up with Dr. Gill on Tuesday 6/11/19 at 2:30 pm for a post op follow up visit, please call the [facility] office to confirm your appointment at [phone].   2. Please schedule an appointment with your Interventional Cardiologist Dr. Evans 1 month following your procedure for a follow up visit and a repeat echocardiogram at that time. Please call the  [facility] office to schedule an appointment.   3. Please schedule an appointment with your Cardiologist Dr. Perdue in 1-2 weeks, please call the office to schedule an appointment.   4. Please schedule an appointment with your PCP Dr. Rivera in 1 week, call the office to schedule an appointment.   5. Referral forwarded to [facility] for visiting nurse. A nurse will call you the day after discharge to schedule your appointment.

## 2019-06-07 NOTE — DISCHARGE NOTE PROVIDER - NSDCPNSUBOBJ_GEN_ALL_CORE
VITAL SIGNS        Subjective: Denies CP, palpitation, SOB, BROCK, HA, dizziness, N/V/D, fever or chills.  No acute event noted overnight.         Telemetry: NSR 60-80         Vital Signs Last 24 Hrs    T(C): 36.6 (19 @ 05:00), Max: 36.7 (19 @ 12:00)    T(F): 97.9 (19 @ 05:00), Max: 98 (19 @ 12:00)    HR: 62 (19 @ 05:00) (57 - 77)    BP: 112/74 (19 @ 05:00) (100/57 - 112/74)    RR: 18 (19 @ 05:00) (16 - 30)    SpO2: 98% (19 @ 05:00) (96% - 98%)              @ 07:01  -   @ 07:00    --------------------------------------------------------    IN: 840 mL / OUT: 200 mL / NET: 640 mL         @ 07:01  -   @ 10:58    --------------------------------------------------------    IN: 240 mL / OUT: 200 mL / NET: 40 mL        Daily       Daily Weight in k.3 (2019 08:21)        PHYSICAL EXAM        Neurology: alert and oriented x 3, nonfocal, no gross deficits        CV: (+) S1 and S2, No murmurs, rubs, gallops or clicks         Lungs: CTA B/L         Abdomen: soft, nontender, nondistended, positive bowel sounds, (+) Flatus; (+) BM         :  Voiding                   Extremities:  B/L LE (-) edema; negative calf tenderness; (+) 2 DP palpable; B/L groins C/D/I             acetaminophen Tablet .. 650 milliGRAM(s) Oral every 6 hours PRN    aspirin enteric coated 81 milliGRAM(s) Oral daily    citalopram 20 milliGRAM(s) Oral daily    clindamycin IVPB 900 milliGRAM(s) IV Intermittent once    clopidogrel Tablet 75 milliGRAM(s) Oral daily    docusate sodium 100 milliGRAM(s) Oral three times a day    gabapentin 600 milliGRAM(s) Oral three times a day    pantoprazole Tablet 40 milliGRAM(s) Oral before breakfast    simvastatin 20 milliGRAM(s) Oral at bedtime    sodium chloride 0.9% lock flush 3 milliLiter(s) IV Push every 8 hours    sorbitol 70% Solution 30 milliLiter(s) Oral once    traMADol 50 milliGRAM(s) Oral at bedtime        Physical Therapy Rec:   Home  [  ]   Home w/ PT  [ X ]  Rehab  [  ]        Discussed with Cardiothoracic Team at AM rounds.        Spent 40 min with patient and discharge note

## 2019-06-07 NOTE — DISCHARGE NOTE PROVIDER - NSDCHHNEEDSERVICE_GEN_ALL_CORE
Medication teaching and assessment/Observation and assessment/Teaching and training/Wound care and assessment

## 2019-06-07 NOTE — DISCHARGE NOTE NURSING/CASE MANAGEMENT/SOCIAL WORK - NSDCDPATPORTLINK_GEN_ALL_CORE
You can access the [facility] Patient Portal, offered by [facility], by registering with the following website: [email or web address]

## 2019-06-11 ENCOUNTER — APPOINTMENT (OUTPATIENT)
Dept: CARDIOTHORACIC SURGERY | Facility: CLINIC | Age: 84
End: 2019-06-11
Payer: MEDICARE

## 2019-06-11 ENCOUNTER — NON-APPOINTMENT (OUTPATIENT)
Age: 84
End: 2019-06-11

## 2019-06-11 VITALS
RESPIRATION RATE: 12 BRPM | OXYGEN SATURATION: 95 % | BODY MASS INDEX: 22.58 KG/M2 | SYSTOLIC BLOOD PRESSURE: 112 MMHG | TEMPERATURE: 98.1 F | HEIGHT: 59 IN | HEART RATE: 88 BPM | WEIGHT: 112 LBS | DIASTOLIC BLOOD PRESSURE: 78 MMHG

## 2019-06-11 PROCEDURE — 99214 OFFICE O/P EST MOD 30 MIN: CPT

## 2019-07-17 ENCOUNTER — NON-APPOINTMENT (OUTPATIENT)
Age: 84
End: 2019-07-17

## 2019-07-17 ENCOUNTER — APPOINTMENT (OUTPATIENT)
Dept: CARDIOTHORACIC SURGERY | Facility: CLINIC | Age: 84
End: 2019-07-17
Payer: MEDICARE

## 2019-07-17 ENCOUNTER — APPOINTMENT (OUTPATIENT)
Dept: CV DIAGNOSITCS | Facility: HOSPITAL | Age: 84
End: 2019-07-17

## 2019-07-17 ENCOUNTER — OUTPATIENT (OUTPATIENT)
Dept: OUTPATIENT SERVICES | Facility: HOSPITAL | Age: 84
LOS: 1 days | End: 2019-07-17
Payer: COMMERCIAL

## 2019-07-17 VITALS
DIASTOLIC BLOOD PRESSURE: 86 MMHG | HEIGHT: 59 IN | SYSTOLIC BLOOD PRESSURE: 150 MMHG | OXYGEN SATURATION: 96 % | RESPIRATION RATE: 14 BRPM | WEIGHT: 112 LBS | BODY MASS INDEX: 22.58 KG/M2 | HEART RATE: 74 BPM | TEMPERATURE: 98.3 F

## 2019-07-17 VITALS — DIASTOLIC BLOOD PRESSURE: 67 MMHG | SYSTOLIC BLOOD PRESSURE: 147 MMHG

## 2019-07-17 DIAGNOSIS — Z90.3 ACQUIRED ABSENCE OF STOMACH [PART OF]: Chronic | ICD-10-CM

## 2019-07-17 DIAGNOSIS — R06.02 SHORTNESS OF BREATH: ICD-10-CM

## 2019-07-17 DIAGNOSIS — Z90.49 ACQUIRED ABSENCE OF OTHER SPECIFIED PARTS OF DIGESTIVE TRACT: Chronic | ICD-10-CM

## 2019-07-17 DIAGNOSIS — Z98.41 CATARACT EXTRACTION STATUS, RIGHT EYE: Chronic | ICD-10-CM

## 2019-07-17 DIAGNOSIS — Z86.79 PERSONAL HISTORY OF OTHER DISEASES OF THE CIRCULATORY SYSTEM: ICD-10-CM

## 2019-07-17 DIAGNOSIS — Z95.3 PRESENCE OF XENOGENIC HEART VALVE: ICD-10-CM

## 2019-07-17 DIAGNOSIS — I35.0 NONRHEUMATIC AORTIC (VALVE) STENOSIS: ICD-10-CM

## 2019-07-17 DIAGNOSIS — Z98.890 OTHER SPECIFIED POSTPROCEDURAL STATES: Chronic | ICD-10-CM

## 2019-07-17 PROCEDURE — 99215 OFFICE O/P EST HI 40 MIN: CPT

## 2019-07-17 PROCEDURE — 93306 TTE W/DOPPLER COMPLETE: CPT

## 2019-07-17 PROCEDURE — 93000 ELECTROCARDIOGRAM COMPLETE: CPT

## 2019-07-17 PROCEDURE — 93306 TTE W/DOPPLER COMPLETE: CPT | Mod: 26

## 2019-07-17 NOTE — DISCUSSION/SUMMARY
[Mitral Regurgitation] : mitral regurgitation [Compensated] : compensated [Stable] : stable [None] : none [Patient] : the patient [Family] : the patient's family [de-identified] : consider adding an ACE or ARB if hypertensive (she was on a beta blocker pre op) [FreeTextEntry1] : Therese is doing well since her TAVR.  I reviewed her preliminary TTE done today which reveals a well seated TAVR with minimal AI--but she continues to have moderate to severe MR as she did pre-TAVR.  She notes a marked improvement, but not total resolution, in her exertional dyspnea (it is not lifestyle limiting; NYHA I).  She is hypertensive today but notes usually well controlled BP--she was stressed due to a broken AC at home.  I recommended that when she see Dr Perdue for her cardiac follow up that an ARB or ACE be considered if her BP remains above goal (and in the setting of MR).  Should she have symptoms from MR, or signs of CHF, I would have her schedule a MEGA to assess if her MR is amenable to percutaneous mitral repair.  She is bruising on DAPT and may stop Clopidogrel and continue a low dose aspirin at this time.

## 2019-07-17 NOTE — PHYSICAL EXAM
[General Appearance - Well Developed] : well developed [General Appearance - Well Nourished] : well nourished [Normal Conjunctiva] : the conjunctiva exhibited no abnormalities [Normal Oral Mucosa] : normal oral mucosa [Normal Jugular Venous V Waves Present] : normal jugular venous V waves present [] : no respiratory distress [Respiration, Rhythm And Depth] : normal respiratory rhythm and effort [Auscultation Breath Sounds / Voice Sounds] : lungs were clear to auscultation bilaterally [Heart Rate And Rhythm] : heart rate and rhythm were normal [Heart Sounds] : normal S1 and S2 [Arterial Pulses Normal] : the arterial pulses were normal [Edema] : no peripheral edema present [Bowel Sounds] : normal bowel sounds [Abdomen Soft] : soft [Abnormal Walk] : normal gait [Nail Clubbing] : no clubbing of the fingernails [Cyanosis, Localized] : no localized cyanosis [Skin Color & Pigmentation] : normal skin color and pigmentation [Skin Turgor] : normal skin turgor [Oriented To Time, Place, And Person] : oriented to person, place, and time [Impaired Insight] : insight and judgment were intact [Affect] : the affect was normal [Mood] : the mood was normal [Memory Recent] : recent memory was not impaired [Memory Remote] : remote memory was not impaired [Normal Appearance] : normal appearance [Well Groomed] : well groomed [General Appearance - In No Acute Distress] : no acute distress [Abdomen Tenderness] : non-tender [FreeTextEntry1] : groins soft

## 2019-07-17 NOTE — REVIEW OF SYSTEMS
[Dyspnea on exertion] : dyspnea during exertion [Palpitations] : palpitations [Dizziness] : dizziness [Easy Bruising] : a tendency for easy bruising [Negative] : Endocrine [Fever] : no fever [Chills] : no chills [Shortness Of Breath] : no shortness of breath [Chest Pain] : no chest pain [Lower Ext Edema] : no extremity edema [Cough] : no cough [Joint Pain] : no joint pain [Numbness (Hypesthesia)] : no numbness [Easy Bleeding] : no tendency for easy bleeding

## 2019-07-17 NOTE — HISTORY OF PRESENT ILLNESS
[FreeTextEntry1] : Therese is doing well since her TAVR but notes "I still feel tired" and expected this to diminish post op.  She reports dyspnea "once in while when I am going up steps" but "it is not as bad as before and I have a lot of steps to go up" at home.  She is bruising on DAPT.  She has no angina or syncope but some intermittent dizziness--which is a chronic issue, noting "I got dizzy before the surgery too."  She wore a MCOT for almost 2 weeks post TAVR and no significant arrhythmias were noted.  Her appetite is "very good" and bathroom habits normal.

## 2019-10-08 ENCOUNTER — APPOINTMENT (OUTPATIENT)
Dept: PULMONOLOGY | Facility: CLINIC | Age: 84
End: 2019-10-08

## 2020-03-05 LAB
ALBUMIN SERPL ELPH-MCNC: 4.1 G/DL
ALP BLD-CCNC: 66 U/L
ALT SERPL-CCNC: 23 U/L
ANION GAP SERPL CALC-SCNC: 10 MMOL/L
AST SERPL-CCNC: 24 U/L
BASOPHILS # BLD AUTO: 0.06 K/UL
BASOPHILS NFR BLD AUTO: 0.8 %
BILIRUB SERPL-MCNC: 0.4 MG/DL
BUN SERPL-MCNC: 21 MG/DL
CALCIUM SERPL-MCNC: 9.1 MG/DL
CHLORIDE SERPL-SCNC: 103 MMOL/L
CO2 SERPL-SCNC: 28 MMOL/L
CREAT SERPL-MCNC: 0.88 MG/DL
EOSINOPHIL # BLD AUTO: 0.5 K/UL
EOSINOPHIL NFR BLD AUTO: 6.5 %
GLUCOSE SERPL-MCNC: 93 MG/DL
HCT VFR BLD CALC: 42.9 %
HGB BLD-MCNC: 13.4 G/DL
IMM GRANULOCYTES NFR BLD AUTO: 0.4 %
LYMPHOCYTES # BLD AUTO: 1.53 K/UL
LYMPHOCYTES NFR BLD AUTO: 19.8 %
MAN DIFF?: NORMAL
MCHC RBC-ENTMCNC: 29.3 PG
MCHC RBC-ENTMCNC: 31.2 GM/DL
MCV RBC AUTO: 93.9 FL
MONOCYTES # BLD AUTO: 0.76 K/UL
MONOCYTES NFR BLD AUTO: 9.8 %
NEUTROPHILS # BLD AUTO: 4.84 K/UL
NEUTROPHILS NFR BLD AUTO: 62.7 %
NT-PROBNP SERPL-MCNC: 998 PG/ML
PLATELET # BLD AUTO: 225 K/UL
POTASSIUM SERPL-SCNC: 5.3 MMOL/L
PROT SERPL-MCNC: 6.4 G/DL
RBC # BLD: 4.57 M/UL
RBC # FLD: 14.5 %
SODIUM SERPL-SCNC: 140 MMOL/L
WBC # FLD AUTO: 7.72 K/UL

## 2021-04-05 NOTE — OCCUPATIONAL THERAPY INITIAL EVALUATION ADULT - SIT-TO-STAND BALANCE
INFECTIOUS DISEASES PROGRESS NOTE    Patient:  Paresh Calvo  YOB: 1953  MRN: [id number]   Admit date: 3/26/2021   Admitting Physician: Shannon Ingram MD  Primary Care Physician: Jacob Hsieh MD    CHIEF COMPLAINT:***      Interval History: ***      Allergies:    Allergies   Allergen Reactions    Amoxicillin Rash    Lipitor Rash    Niaspan [Niacin Er] Rash    Penicillins Rash    Relafen [Nabumetone] Rash    Zocor [Simvastatin] Rash     Muscle cramps       Current Meds: metoclopramide (REGLAN) tablet 5 mg, TID AC  ceFAZolin (ANCEF) 2000 mg in 0.9% sodium chloride 50 mL IVPB, Q8H  predniSONE (DELTASONE) tablet 20 mg, Daily    Followed by  Minesh Quails ON 4/7/2021] predniSONE (DELTASONE) tablet 10 mg, Daily    Followed by  Minesh Quails ON 4/10/2021] predniSONE (DELTASONE) tablet 5 mg, Daily  magnesium oxide (MAG-OX) tablet 400 mg, Daily  potassium chloride (KLOR-CON M) extended release tablet 20 mEq, Daily with breakfast  gabapentin (NEURONTIN) capsule 300 mg, Nightly  miconazole (MICOTIN) 2 % powder, PRN  traMADol (ULTRAM) tablet 50 mg, TID  sodium chloride flush 0.9 % injection 10 mL, PRN  sodium chloride flush 0.9 % injection 10 mL, BID  oxyCODONE (ROXICODONE) immediate release tablet 15 mg, 4x Daily  furosemide (LASIX) tablet 40 mg, Daily  levothyroxine (SYNTHROID) tablet 75 mcg, Daily  cloNIDine (CATAPRES) tablet 0.1 mg, BID PRN  acyclovir (ZOVIRAX) capsule 400 mg, BID  pantoprazole (PROTONIX) tablet 40 mg, Daily  docusate sodium (COLACE) capsule 100 mg, BID  therapeutic multivitamin-minerals 1 tablet, Daily  nitroGLYCERIN (NITROSTAT) SL tablet 0.4 mg, Q5 Min PRN  isosorbide mononitrate (IMDUR) extended release tablet 60 mg, BID  carvedilol (COREG) tablet 25 mg, BID WC  guaiFENesin (MUCINEX) extended release tablet 1,200 mg, BID  bisacodyl (DULCOLAX) suppository 10 mg, Daily PRN  budesonide (PULMICORT) nebulizer suspension 500 mcg, BID PRN  cyclobenzaprine (FLEXERIL) tablet 10 mg, TID PRN  gabapentin (NEURONTIN) capsule 100 mg, Q12H  heparin (porcine) injection 5,000 Units, Q12H  lidocaine 4 % external patch 1 patch, Daily  magnesium hydroxide (MILK OF MAGNESIA) 400 MG/5ML suspension 30 mL, Daily PRN  naloxegol (MOVANTIK) tablet 25 mg, QAM  ondansetron (ZOFRAN) tablet 4 mg, Q6H PRN  polyethylene glycol (GLYCOLAX) packet 17 g, Daily  sennosides-docusate sodium (SENOKOT-S) 8.6-50 MG tablet 2 tablet, BID  acetaminophen (TYLENOL) tablet 650 mg, Q4H PRN  bisacodyl (DULCOLAX) suppository 10 mg, Daily PRN        Review of Systems    Vital Signs:  /67   Pulse 72   Temp 97.3 °F (36.3 °C) (Temporal)   Resp 16   Ht 5' 6\" (1.676 m)   Wt 198 lb 0.7 oz (89.8 kg)   SpO2 91%   BMI 31.97 kg/m²   Temp (24hrs), Av.4 °F (36.3 °C), Min:97.3 °F (36.3 °C), Max:97.4 °F (36.3 °C)      Physical Exam    Line/IV site: No erythema,warmth, induration, or tenderness. LAB RESULTS:    CBC with DIFF:  Recent Labs     21  0354   WBC 6.8   RBC 3.24*   HGB 9.2*   HCT 31.5*   MCV 97.2   MCH 28.4   MCHC 29.2*   RDW 17.1*      MPV 10.3   NEUTOPHILPCT 50.2   LYMPHOPCT 31.2   MONOPCT 12.9*   EOSRELPCT 1.0   BASOPCT 0.7   NEUTROABS 3.4   LYMPHSABS 2.1   MONOSABS 0.90   EOSABS 0.10   BASOSABS 0.10       CMP/BMP:  Recent Labs     21  0354      K 4.3   CL 97*   CO2 34*   ANIONGAP 6*   GLUCOSE 82   BUN 20   CREATININE 0.4*   LABGLOM >60   CALCIUM 8.7*         Culture Results:    No results for input(s): CXSURG in the last 720 hours. Blood Culture Recent: No results for input(s): BC in the last 720 hours. RADIOLOGY      No results found.                 Patient Active Problem List   Diagnosis    Coronary artery disease of native artery of native heart with stable angina pectoris ([address]    Hypertension    Rheumatoid arteritis ([address]    History of pulmonary fibrosis    History of DVT (deep vein thrombosis)    Thyroid disease    Intrinsic asthma    Hypercholesterolemia    Syncope, cardiogenic    Near syncope    Status post placement of implantable loop recorder    Left carotid bruit    S/P coronary artery stent placement    Chest pain    Pacemaker    Sinus node dysfunction (HCC)    Lumbar stenosis with neurogenic claudication    Spondylolisthesis of lumbar region    Leg swelling    Primary osteoarthritis of right knee    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 ***    RECOMMENDATIONS :  ·         Jessica Mccray MD [facility]

## 2021-06-29 ENCOUNTER — APPOINTMENT (OUTPATIENT)
Dept: COLORECTAL SURGERY | Facility: CLINIC | Age: 86
End: 2021-06-29
Payer: MEDICARE

## 2021-06-29 VITALS
WEIGHT: 112 LBS | SYSTOLIC BLOOD PRESSURE: 160 MMHG | TEMPERATURE: 98.1 F | BODY MASS INDEX: 21.99 KG/M2 | HEIGHT: 60 IN | RESPIRATION RATE: 15 BRPM | DIASTOLIC BLOOD PRESSURE: 89 MMHG | HEART RATE: 75 BPM | OXYGEN SATURATION: 97 %

## 2021-06-29 DIAGNOSIS — Z87.891 PERSONAL HISTORY OF NICOTINE DEPENDENCE: ICD-10-CM

## 2021-06-29 DIAGNOSIS — K64.4 RESIDUAL HEMORRHOIDAL SKIN TAGS: ICD-10-CM

## 2021-06-29 PROCEDURE — 99204 OFFICE O/P NEW MOD 45 MIN: CPT | Mod: 25

## 2021-06-29 PROCEDURE — 46600 DIAGNOSTIC ANOSCOPY SPX: CPT

## 2021-06-29 RX ORDER — AMLODIPINE BESYLATE 2.5 MG/1
2.5 TABLET ORAL
Refills: 0 | Status: ACTIVE | COMMUNITY

## 2021-06-29 RX ORDER — HYDROCORTISONE 25 MG/G
2.5 CREAM TOPICAL
Qty: 1 | Refills: 5 | Status: ACTIVE | COMMUNITY
Start: 2021-06-29 | End: 1900-01-01

## 2021-06-29 RX ORDER — MULTIVIT-MIN/FA/LYCOPEN/LUTEIN .4-300-25
TABLET ORAL
Refills: 0 | Status: ACTIVE | COMMUNITY

## 2021-06-29 RX ORDER — CLOPIDOGREL BISULFATE 75 MG/1
75 TABLET, FILM COATED ORAL
Qty: 14 | Refills: 3 | Status: DISCONTINUED | COMMUNITY
Start: 2019-06-07 | End: 2021-06-29

## 2021-06-29 RX ORDER — CITALOPRAM HYDROBROMIDE 20 MG/1
20 TABLET, FILM COATED ORAL DAILY
Refills: 0 | Status: DISCONTINUED | COMMUNITY
Start: 2017-10-24 | End: 2021-06-29

## 2021-06-29 RX ORDER — ATORVASTATIN CALCIUM 20 MG/1
20 TABLET, FILM COATED ORAL
Refills: 0 | Status: ACTIVE | COMMUNITY

## 2021-06-29 RX ORDER — PAROXETINE 12.5 MG/1
12.5 TABLET, FILM COATED, EXTENDED RELEASE ORAL
Refills: 0 | Status: ACTIVE | COMMUNITY

## 2021-06-29 RX ORDER — BUPROPION HYDROCHLORIDE 75 MG/1
TABLET, FILM COATED ORAL
Refills: 0 | Status: ACTIVE | COMMUNITY

## 2021-06-29 NOTE — CONSULT LETTER
[Dear  ___] : Dear  [unfilled], [Consult Letter:] : I had the pleasure of evaluating your patient, [unfilled]. [Please see my note below.] : Please see my note below. [Consult Closing:] : Thank you very much for allowing me to participate in the care of this patient.  If you have any questions, please do not hesitate to contact me. [Sincerely,] : Sincerely, [FreeTextEntry2] : Miguel Cordova [FreeTextEntry3] : Myke Ivan MD FACS\par Chief Colon and Rectal Surgery\par [facility]

## 2021-06-29 NOTE — PHYSICAL EXAM
[Wheezing] : wheezing was heard [Normal Heart Sounds] : normal heart sounds [No Rash or Lesion] : No rash or lesion [Alert] : alert [Oriented to Person] : oriented to person [Oriented to Place] : oriented to place [Oriented to Time] : oriented to time [Anxious] : anxious [de-identified] : soft, NT/ND, +BS [de-identified] : Elderly female [de-identified] : NC/AT [de-identified] : MYA/+ROM [de-identified] : Intact

## 2021-06-29 NOTE — REVIEW OF SYSTEMS
[Shortness Of Breath] : shortness of breath [Dizziness] : dizziness [Anxiety] : anxiety [Depression] : depression [Negative] : Heme/Lymph [FreeTextEntry5] : s/p Aortic valve replacement, HTN [FreeTextEntry6] : h/o PE [FreeTextEntry7] : Daily BM [FreeTextEntry8] : Urinary frequency [FreeTextEntry9] : back pain, history of surgery

## 2021-06-29 NOTE — ASSESSMENT
[FreeTextEntry1] : Anal fissure bleeding and pain. Swollen anal skin tag likely secondary to anal fissure\par -Likely sentinel pile. I recommended initial conservative therapy\par -Fiber supplement 1-2 times per day\par -Hydrocortisone cream 2 times per day\par -Patient will followup in 2 weeks for reevaluation if persistent abnormal skin over the skin tag, I will likely recommend biopsy. However it appears to be secondary to obvious anal fissure\par -Patient agreed to this plan and we'll initiate treatment as described\par -Followup in 2 weeks

## 2021-06-29 NOTE — HISTORY OF PRESENT ILLNESS
[FreeTextEntry1] : Patient is a 84 yo WF here with complaints of an anal bump.  Patient reports anal swelling noted for the last  2-3 months. Swelling occurs intermittently and is not associated with bowel movements. Patient does report discomfort with bowel movements occasional blood per rectum. Patient reports 4-5 loose bowel movements per day. No fevers or chills no Ken discomfort no nausea or vomiting. Patient with family history of sister with colon cancer no history of inflammatory bowel disease. Patient has not attempted any medications for relief. No specific aggravating factors

## 2021-07-13 ENCOUNTER — APPOINTMENT (OUTPATIENT)
Dept: COLORECTAL SURGERY | Facility: CLINIC | Age: 86
End: 2021-07-13
Payer: MEDICARE

## 2021-07-13 DIAGNOSIS — K60.2 ANAL FISSURE, UNSPECIFIED: ICD-10-CM

## 2021-07-13 PROCEDURE — 99213 OFFICE O/P EST LOW 20 MIN: CPT

## 2021-07-13 NOTE — ASSESSMENT
[FreeTextEntry1] : Lichenification perianal skin with resolving fissure patient with improved symptoms\par -Continue steroid cream\par -Continue fiber supplementation\par -Dry dressing to area it to keep dry\par -Follow up in 4 weeks for wound check

## 2021-07-13 NOTE — HISTORY OF PRESENT ILLNESS
[FreeTextEntry1] : Patient is a 86 yo WF here with complaints of an anal bump.  Patient reports anal swelling noted for the last  2-3 months. Swelling occurs intermittently and is not associated with bowel movements. Patient does report discomfort with bowel movements occasional blood per rectum. Patient reports 4-5 loose bowel movements per day. No fevers or chills no Ken discomfort no nausea or vomiting. Patient with family history of sister with colon cancer no history of inflammatory bowel disease. Patient has not attempted any medications for relief. No specific aggravating factors\par \par July 13, 2021-patient reports significant improvement. Denies pain. Swelling of skin his decreased. No fevers or chills improved bowel movements. No nausea or vomiting. No blood per rectum. No aggravating factors

## 2021-08-10 ENCOUNTER — APPOINTMENT (OUTPATIENT)
Dept: COLORECTAL SURGERY | Facility: CLINIC | Age: 86
End: 2021-08-10
Payer: MEDICARE

## 2021-08-10 DIAGNOSIS — K62.5 HEMORRHAGE OF ANUS AND RECTUM: ICD-10-CM

## 2021-08-10 DIAGNOSIS — K62.89 OTHER SPECIFIED DISEASES OF ANUS AND RECTUM: ICD-10-CM

## 2021-08-10 PROCEDURE — 99213 OFFICE O/P EST LOW 20 MIN: CPT | Mod: 25

## 2021-08-10 PROCEDURE — 11102 TANGNTL BX SKIN SINGLE LES: CPT

## 2021-08-10 NOTE — HISTORY OF PRESENT ILLNESS
[FreeTextEntry1] : Patient is a 86 yo WF here with complaints of an anal bump.  Patient reports anal swelling noted for the last  2-3 months. Swelling occurs intermittently and is not associated with bowel movements. Patient does report discomfort with bowel movements occasional blood per rectum. Patient reports 4-5 loose bowel movements per day. No fevers or chills no Ken discomfort no nausea or vomiting. Patient with family history of sister with colon cancer no history of inflammatory bowel disease. Patient has not attempted any medications for relief. No specific aggravating factors\par \par July 13, 2021-patient reports significant improvement. Denies pain. Swelling of skin his decreased. No fevers or chills improved bowel movements. No nausea or vomiting. No blood per rectum. No aggravating factors\par \par August 10, 2021-patient reports significant improvement. No bowel pain no perianal pain. No fevers or chills. Symptoms worse with extensive diarrhea. Swelling of the skin is still appreciable but significantly decreased. No aggravating factors

## 2021-08-10 NOTE — ASSESSMENT
[FreeTextEntry1] : Anterior midline chronic skin changes\par -Skin changes and significantly improved, however induration has persisted. I recommended patient undergo a biopsy to rule out infectious or neoplastic causes.\par -Area was injected with 5 cc of 1% lidocaine with epinephrine and an incisional biopsy was taken of the anterior midline skin. Hemostasis was achieved with Monsel. Patient tolerated without\par -Follow up in one week for reevaluation and to discuss pathology results\par -All questions answered

## 2021-08-18 NOTE — H&P PST ADULT - NEUROLOGICAL
Radiation therapy to the pelvis for treatment of anal cancer was resumed yesterday.  She is scheduled to receive her last radiation treatment today completing 5400 cGy in 30 fractions.  She is experiencing a brisk perianal erythematous reaction as expected.  As an outpatient, she was prescribed Silvadene for application to areas of moist desquamation.  I will order this medication to be applied while inpatient as well.   details… Alert & oriented; no sensory, motor or coordination deficits, normal reflexes

## 2021-08-19 ENCOUNTER — APPOINTMENT (OUTPATIENT)
Dept: COLORECTAL SURGERY | Facility: CLINIC | Age: 86
End: 2021-08-19
Payer: MEDICARE

## 2021-08-19 DIAGNOSIS — L90.0 LICHEN SCLEROSUS ET ATROPHICUS: ICD-10-CM

## 2021-08-19 PROCEDURE — 99213 OFFICE O/P EST LOW 20 MIN: CPT

## 2021-08-19 NOTE — ASSESSMENT
[FreeTextEntry1] : Perianal lichens sclerosis\par -Patient to continue fiber supplementation\par -Keep the area dry\par -Hydrocortisone cream as needed\par -Patient will followup in 3 months for reevaluation

## 2021-08-19 NOTE — HISTORY OF PRESENT ILLNESS
[FreeTextEntry1] : Patient is a 86 yo WF here with complaints of an anal bump.  Patient reports anal swelling noted for the last  2-3 months. Swelling occurs intermittently and is not associated with bowel movements. Patient does report discomfort with bowel movements occasional blood per rectum. Patient reports 4-5 loose bowel movements per day. No fevers or chills no Ken discomfort no nausea or vomiting. Patient with family history of sister with colon cancer no history of inflammatory bowel disease. Patient has not attempted any medications for relief. No specific aggravating factors\par \par July 13, 2021-patient reports significant improvement. Denies pain. Swelling of skin his decreased. No fevers or chills improved bowel movements. No nausea or vomiting. No blood per rectum. No aggravating factors\par \par August 19, 2021-patient progressing well. Status post biopsy of perianal skin. Denies pain denies bleeding. No fevers or chills no nausea or vomiting. No artery in factors. Patient is anxious about biopsy results.\par August 10, 2021-patient reports significant improvement. No bowel pain no perianal pain. No fevers or chills. Symptoms worse with extensive diarrhea. Swelling of the skin is still appreciable but significantly decreased. No aggravating factors

## 2021-08-21 LAB — CORE LAB BIOPSY: NORMAL

## 2021-11-15 NOTE — PATIENT PROFILE ADULT. - FUNCTIONAL SCREEN CURRENT LEVEL: TRANSFERRING, MLM
Patient called and said that she got a letter for [employer] company that she needs a breast ca screening and cervical screening  She would like information on it and talk to the /ma about it  Please give her a call at [phone]  thanks   (3) assistive equipment and person

## 2021-12-08 NOTE — ASU PREOP CHECKLIST - ADVANCE DIRECTIVE ADDRESSED/READDRESSED
done

## 2022-09-23 ENCOUNTER — EMERGENCY (EMERGENCY)
Facility: HOSPITAL | Age: 87
LOS: 1 days | Discharge: HOME | End: 2022-09-23
Attending: STUDENT IN AN ORGANIZED HEALTH CARE EDUCATION/TRAINING PROGRAM | Admitting: STUDENT IN AN ORGANIZED HEALTH CARE EDUCATION/TRAINING PROGRAM

## 2022-09-23 VITALS
HEART RATE: 74 BPM | TEMPERATURE: 98 F | RESPIRATION RATE: 17 BRPM | OXYGEN SATURATION: 95 % | SYSTOLIC BLOOD PRESSURE: 158 MMHG | DIASTOLIC BLOOD PRESSURE: 89 MMHG

## 2022-09-23 VITALS
TEMPERATURE: 98 F | HEIGHT: 59 IN | SYSTOLIC BLOOD PRESSURE: 150 MMHG | RESPIRATION RATE: 17 BRPM | HEART RATE: 68 BPM | DIASTOLIC BLOOD PRESSURE: 83 MMHG | OXYGEN SATURATION: 97 %

## 2022-09-23 DIAGNOSIS — Z98.890 OTHER SPECIFIED POSTPROCEDURAL STATES: Chronic | ICD-10-CM

## 2022-09-23 DIAGNOSIS — Z98.41 CATARACT EXTRACTION STATUS, RIGHT EYE: Chronic | ICD-10-CM

## 2022-09-23 DIAGNOSIS — Z90.3 ACQUIRED ABSENCE OF STOMACH [PART OF]: Chronic | ICD-10-CM

## 2022-09-23 DIAGNOSIS — Z90.49 ACQUIRED ABSENCE OF OTHER SPECIFIED PARTS OF DIGESTIVE TRACT: Chronic | ICD-10-CM

## 2022-09-23 LAB
ALBUMIN SERPL ELPH-MCNC: 4.2 G/DL — SIGNIFICANT CHANGE UP (ref 3.3–5)
ALP SERPL-CCNC: 90 U/L — SIGNIFICANT CHANGE UP (ref 40–120)
ALT FLD-CCNC: 36 U/L — HIGH (ref 4–33)
ANION GAP SERPL CALC-SCNC: 11 MMOL/L — SIGNIFICANT CHANGE UP (ref 7–14)
APPEARANCE UR: CLEAR — SIGNIFICANT CHANGE UP
APTT BLD: 37 SEC — HIGH (ref 27–36.3)
AST SERPL-CCNC: 27 U/L — SIGNIFICANT CHANGE UP (ref 4–32)
BASOPHILS # BLD AUTO: 0.08 K/UL — SIGNIFICANT CHANGE UP (ref 0–0.2)
BASOPHILS NFR BLD AUTO: 0.6 % — SIGNIFICANT CHANGE UP (ref 0–2)
BILIRUB SERPL-MCNC: 0.6 MG/DL — SIGNIFICANT CHANGE UP (ref 0.2–1.2)
BILIRUB UR-MCNC: NEGATIVE — SIGNIFICANT CHANGE UP
BUN SERPL-MCNC: 24 MG/DL — HIGH (ref 7–23)
CALCIUM SERPL-MCNC: 9.6 MG/DL — SIGNIFICANT CHANGE UP (ref 8.4–10.5)
CHLORIDE SERPL-SCNC: 101 MMOL/L — SIGNIFICANT CHANGE UP (ref 98–107)
CO2 SERPL-SCNC: 27 MMOL/L — SIGNIFICANT CHANGE UP (ref 22–31)
COLOR SPEC: SIGNIFICANT CHANGE UP
CREAT SERPL-MCNC: 0.76 MG/DL — SIGNIFICANT CHANGE UP (ref 0.5–1.3)
DIFF PNL FLD: NEGATIVE — SIGNIFICANT CHANGE UP
EGFR: 76 ML/MIN/1.73M2 — SIGNIFICANT CHANGE UP
EOSINOPHIL # BLD AUTO: 0.41 K/UL — SIGNIFICANT CHANGE UP (ref 0–0.5)
EOSINOPHIL NFR BLD AUTO: 3.3 % — SIGNIFICANT CHANGE UP (ref 0–6)
ETHANOL SERPL-MCNC: <10 MG/DL — SIGNIFICANT CHANGE UP
FLUAV AG NPH QL: SIGNIFICANT CHANGE UP
FLUBV AG NPH QL: SIGNIFICANT CHANGE UP
GLUCOSE SERPL-MCNC: 152 MG/DL — HIGH (ref 70–99)
GLUCOSE UR QL: NEGATIVE — SIGNIFICANT CHANGE UP
HCT VFR BLD CALC: 45.2 % — HIGH (ref 34.5–45)
HGB BLD-MCNC: 14.4 G/DL — SIGNIFICANT CHANGE UP (ref 11.5–15.5)
IANC: 9.4 K/UL — HIGH (ref 1.8–7.4)
IMM GRANULOCYTES NFR BLD AUTO: 0.6 % — SIGNIFICANT CHANGE UP (ref 0–0.9)
INR BLD: 1.1 RATIO — SIGNIFICANT CHANGE UP (ref 0.88–1.16)
KETONES UR-MCNC: NEGATIVE — SIGNIFICANT CHANGE UP
LACTATE SERPL-SCNC: 1 MMOL/L — SIGNIFICANT CHANGE UP (ref 0.5–2)
LEUKOCYTE ESTERASE UR-ACNC: NEGATIVE — SIGNIFICANT CHANGE UP
LIDOCAIN IGE QN: 21 U/L — SIGNIFICANT CHANGE UP (ref 7–60)
LYMPHOCYTES # BLD AUTO: 1.09 K/UL — SIGNIFICANT CHANGE UP (ref 1–3.3)
LYMPHOCYTES # BLD AUTO: 8.7 % — LOW (ref 13–44)
MCHC RBC-ENTMCNC: 29.2 PG — SIGNIFICANT CHANGE UP (ref 27–34)
MCHC RBC-ENTMCNC: 31.9 GM/DL — LOW (ref 32–36)
MCV RBC AUTO: 91.7 FL — SIGNIFICANT CHANGE UP (ref 80–100)
MONOCYTES # BLD AUTO: 1.48 K/UL — HIGH (ref 0–0.9)
MONOCYTES NFR BLD AUTO: 11.8 % — SIGNIFICANT CHANGE UP (ref 2–14)
NEUTROPHILS # BLD AUTO: 9.4 K/UL — HIGH (ref 1.8–7.4)
NEUTROPHILS NFR BLD AUTO: 75 % — SIGNIFICANT CHANGE UP (ref 43–77)
NITRITE UR-MCNC: NEGATIVE — SIGNIFICANT CHANGE UP
NRBC # BLD: 0 /100 WBCS — SIGNIFICANT CHANGE UP (ref 0–0)
NRBC # FLD: 0 K/UL — SIGNIFICANT CHANGE UP (ref 0–0)
PH UR: 5.5 — SIGNIFICANT CHANGE UP (ref 5–8)
PLATELET # BLD AUTO: 234 K/UL — SIGNIFICANT CHANGE UP (ref 150–400)
POTASSIUM SERPL-MCNC: 4.7 MMOL/L — SIGNIFICANT CHANGE UP (ref 3.5–5.3)
POTASSIUM SERPL-SCNC: 4.7 MMOL/L — SIGNIFICANT CHANGE UP (ref 3.5–5.3)
PROT SERPL-MCNC: 6.8 G/DL — SIGNIFICANT CHANGE UP (ref 6–8.3)
PROT UR-MCNC: NEGATIVE — SIGNIFICANT CHANGE UP
PROTHROM AB SERPL-ACNC: 12.8 SEC — SIGNIFICANT CHANGE UP (ref 10.5–13.4)
RBC # BLD: 4.93 M/UL — SIGNIFICANT CHANGE UP (ref 3.8–5.2)
RBC # FLD: 12.5 % — SIGNIFICANT CHANGE UP (ref 10.3–14.5)
RSV RNA NPH QL NAA+NON-PROBE: SIGNIFICANT CHANGE UP
SARS-COV-2 RNA SPEC QL NAA+PROBE: SIGNIFICANT CHANGE UP
SODIUM SERPL-SCNC: 139 MMOL/L — SIGNIFICANT CHANGE UP (ref 135–145)
SP GR SPEC: 1.01 — SIGNIFICANT CHANGE UP (ref 1.01–1.05)
TROPONIN T, HIGH SENSITIVITY RESULT: 15 NG/L — SIGNIFICANT CHANGE UP
UROBILINOGEN FLD QL: SIGNIFICANT CHANGE UP
WBC # BLD: 12.53 K/UL — HIGH (ref 3.8–10.5)
WBC # FLD AUTO: 12.53 K/UL — HIGH (ref 3.8–10.5)

## 2022-09-23 PROCEDURE — 70450 CT HEAD/BRAIN W/O DYE: CPT | Mod: 26,MA

## 2022-09-23 PROCEDURE — 72131 CT LUMBAR SPINE W/O DYE: CPT | Mod: 26,MA

## 2022-09-23 PROCEDURE — 72128 CT CHEST SPINE W/O DYE: CPT | Mod: 26,MA

## 2022-09-23 PROCEDURE — 73502 X-RAY EXAM HIP UNI 2-3 VIEWS: CPT | Mod: 26,LT

## 2022-09-23 PROCEDURE — 74176 CT ABD & PELVIS W/O CONTRAST: CPT | Mod: 26,MA

## 2022-09-23 PROCEDURE — 73060 X-RAY EXAM OF HUMERUS: CPT | Mod: 26,RT

## 2022-09-23 PROCEDURE — 71045 X-RAY EXAM CHEST 1 VIEW: CPT | Mod: 26

## 2022-09-23 PROCEDURE — 71250 CT THORAX DX C-: CPT | Mod: 26,MA

## 2022-09-23 PROCEDURE — 99285 EMERGENCY DEPT VISIT HI MDM: CPT

## 2022-09-23 PROCEDURE — 73030 X-RAY EXAM OF SHOULDER: CPT | Mod: 26,RT

## 2022-09-23 PROCEDURE — 73552 X-RAY EXAM OF FEMUR 2/>: CPT | Mod: 26,LT

## 2022-09-23 PROCEDURE — 72125 CT NECK SPINE W/O DYE: CPT | Mod: 26,MA

## 2022-09-23 NOTE — ED PROVIDER NOTE - PATIENT PORTAL LINK FT
You can access the FollowMyHealth Patient Portal offered by [facility] by registering at the following website: [email or web address]. By joining [employer]’s FollowMyHealth portal, you will also be able to view your health information using other applications (apps) compatible with our system.

## 2022-09-23 NOTE — ED PROVIDER NOTE - NSFOLLOWUPINSTRUCTIONS_ED_ALL_ED_FT
Fall Prevention    WHAT YOU NEED TO KNOW:    Fall prevention includes ways to make your home and other areas safer. It also includes ways you can move more carefully to prevent a fall. Health conditions that cause changes in your blood pressure, vision, or muscle strength and coordination may increase your risk for falls. Medicines may also increase your risk for falls if they make you dizzy, weak, or sleepy.     Take Tylenol 1000 mg every 6-8 hours, take Motrin 400mg every with breakfast, lunch, and dinner for pain only for 2-3 days.     DISCHARGE INSTRUCTIONS:    Call 911 or have someone else call if:     You have fallen and are unconscious.      You have fallen and cannot move part of your body.    Contact your healthcare provider if:     You have fallen and have pain or a headache.      You have questions or concerns about your condition or care.    Fall prevention tips:     Stand or sit up slowly. This may help you keep your balance and prevent falls.      Use assistive devices as directed. Your healthcare provider may suggest that you use a cane or walker to help you keep your balance. You may need to have grab bars put in your bathroom near the toilet or in the shower.      Wear shoes that fit well and have soles that . Wear shoes both inside and outside. Use slippers with good . Do not wear shoes with high heels.      Wear a personal alarm. This is a device that allows you to call 911 if you fall and need help. Ask your healthcare provider for more information.      Stay active. Exercise can help strengthen your muscles and improve your balance. Your healthcare provider may recommend water aerobics or walking. He or she may also recommend physical therapy to improve your coordination. Never start an exercise program without talking to your healthcare provider first.       Manage your medical conditions. Keep all appointments with your healthcare providers. Visit your eye doctor as directed.    Home safety tips:     Add items to prevent falls in the bathroom. Put nonslip strips on your bath or shower floor to prevent you from slipping. Use a bath mat if you do not have carpet in the bathroom. This will prevent you from falling when you step out of the bath or shower. Use a shower seat so you do not need to stand while you shower. Sit on the toilet or a chair in your bathroom to dry yourself and put on clothing. This will prevent you from losing your balance from drying or dressing yourself while you are standing.       Keep paths clear. Remove books, shoes, and other objects from walkways and stairs. Place cords for telephones and lamps out of the way so that you do not need to walk over them. Tape them down if you cannot move them. Remove small rugs. If you cannot remove a rug, secure it with double-sided tape. This will prevent you from tripping.       Install bright lights in your home. Use night lights to help light paths to the bathroom or kitchen. Always turn on the light before you start walking.      Keep items you use often on shelves within reach. Do not use a step stool to help you reach an item.      Paint or place reflective tape on the edges of your stairs. This will help you see the stairs better.    Follow up with your healthcare provider as directed: Write down your questions so you remember to ask them during your visits.

## 2022-09-23 NOTE — ED PROVIDER NOTE - PROGRESS NOTE DETAILS
Fidencio Walsh MD:  Ambulating well, no point tenderness on snuffbox/thenar eminence, discussed return precautions and fall prevention

## 2022-09-23 NOTE — ED PROVIDER NOTE - CLINICAL SUMMARY MEDICAL DECISION MAKING FREE TEXT BOX
86 p/w fall x 15 steps, vitals stable, not on ac, +forehead contusion with extremity ttp. moving all extremities, no fnd. concern for fx/dislocation, head bleed, bony contusions. pending trauma labs and imaging of tender areas. will dispo pending workup.

## 2022-09-23 NOTE — ED ADULT NURSE NOTE - OBJECTIVE STATEMENT
Patient is a 86-year-old female, A&OX3, ambulatory Phx of COPD, smoker, osteoporosis, ankle fracture, HLD, acid reflux, radiculopathy srrives s/p fall [id number] steps while at home earlier today. Pt was getting ready to ho to her hair appointment when she slipped and fell. Contusion to forehead and endorsing R shoulder pain. Takes baby ASA daily. Respirations even and unlabored, chest rise equal b/l.  at bedside. MD Medeiros at bedside for eval. Safety maintained. Skin otherwise intact. Will continue to monitor.

## 2022-09-23 NOTE — ED PROVIDER NOTE - ATTENDING CONTRIBUTION TO CARE
I have personally performed a history and physical examination of the patient and discussed management with the resident as well as the patient.  I reviewed the resident's note and agree with the documented findings and plan of care.  I have authored and modified critical sections of the Provider Note, including but not limited to HPI, Physical Exam and MDM.    86 PMH of former smoker, COPD, pulm nodule, AS, GERD, gastric ulcer -s/p partial gastrectomy 1966, spinal stenosis s/p fusion 2017 p/w fall x earlier today. pt states she was coming down stairs, tripped and fell down 15 steps. denies any prodromal sxs prior to fall complaining of neck pain, forehead pain, R shoulder and left leg pain. Possible TBI vs fx vs contusion vs ptx. Declined pain control. Obtain cbc, cmp, tni, ekg, cxr, pelvis xray, etoh, ct head/c-spine/c/a/p. Allergy to IV contrast, trauma CT scans ordered without. Symptomatic control prn. Dispo pending results. Hemodynamically stable at this time.

## 2022-09-23 NOTE — ED ADULT NURSE NOTE - NSIMPLEMENTINTERV_GEN_ALL_ED
Implemented All Fall with Harm Risk Interventions:  [place] to call system. Call bell, personal items and telephone within reach. Instruct patient to call for assistance. Room bathroom lighting operational. Non-slip footwear when patient is off stretcher. Physically safe environment: no spills, clutter or unnecessary equipment. Stretcher in lowest position, wheels locked, appropriate side rails in place. Provide visual cue, wrist band, yellow gown, etc. Monitor gait and stability. Monitor for mental status changes and reorient to person, place, and time. Review medications for side effects contributing to fall risk. Reinforce activity limits and safety measures with patient and family. Provide visual clues: red socks.

## 2022-09-23 NOTE — ED PROVIDER NOTE - NS ED ROS FT
Constitutional: No fever, chills.  Eyes:  No visual changes  ENMT:  No neck pain  Cardiac:  No chest pain  Respiratory:  No cough, SOB  GI:  No nausea, vomiting, diarrhea, abdominal pain.  :  No dysuria, hematuria  MS:  +  Neuro:  No headache or lightheadedness  Skin:  No skin rash Constitutional: No fever, chills.  Eyes:  No visual changes  ENMT:  +neck pain  Cardiac:  No chest pain  Respiratory:  No cough, SOB  GI:  No nausea, vomiting, diarrhea, abdominal pain.  :  No dysuria, hematuria  MS:  +fall   Neuro:  No headache or lightheadedness  Skin:  No skin rash Constitutional: No fever, chills.  Eyes:  No visual changes  ENMT:  +neck pain  Cardiac:  No chest pain  Respiratory:  No cough, SOB  GI:  No nausea, vomiting, diarrhea, abdominal pain.  :  No dysuria, hematuria  MS:  +shoulder pain,  Neuro:  No headache or lightheadedness  Skin:  +L frontal hematoma

## 2022-09-23 NOTE — ED PROVIDER NOTE - NSICDXPASTMEDICALHX_GEN_ALL_CORE_FT
PAST MEDICAL HISTORY:  Acid reflux     Ankle fracture left    Aortic stenosis     Borderline diabetes     COPD with asthma not on any meds ,deneis any exacerbation    Former smoker     History of pulmonary embolism 2017 after  back surgery- treated with AC  for 1 year - Ct chest done as per surgeon    History of smoking     HLD (hyperlipidemia)     Osteoporosis     Other intervertebral disc displacement, lumbar region     Poor historian     Radiculopathy     Shingles outbreak in 2005 2 months after back surgery

## 2022-09-23 NOTE — ED ADULT TRIAGE NOTE - CHIEF COMPLAINT QUOTE
pt had an unwitnessed fall down approx 15 steps. denies LOC and blood thinner use. Hematoma with abrasion to left upper forehead. c/o right shoulder pain. bgl 160. denies visual changes, n/v

## 2022-09-23 NOTE — ED PROVIDER NOTE - OBJECTIVE STATEMENT
86 PMH of former smoker, COPD, pulm nodule, AS, GERD, gastric ulcer -s/p partial gastrectomy 1966, spinal stenosis s/p fusion 2017 p/w fall x earlier today. pt states she was coming down stairs, tripped and fell down 15 steps. denies any prodromal sxs prior to fall  complaining of neck pain, forehead pain, R shoulder and left leg pain. pain currently 6/10 throughout body. no cp, sob, abd pain, vomiting, numbness reported.   did not take any meds prior to fall 86 PMH of former smoker, COPD, pulm nodule, AS, GERD, gastric ulcer -s/p partial gastrectomy 1966, spinal stenosis s/p fusion 2017 p/w fall x earlier today. pt states she was coming down stairs, tripped and fell down 15 steps. denies any prodromal sxs prior to fall complaining of neck pain, forehead pain, R shoulder and left leg pain. pain currently 6/10 throughout body. no cp, sob, abd pain, vomiting, numbness reported. did not take any meds prior to fall. Denies LOC. No other current complaints at this time.

## 2022-09-23 NOTE — ED PROVIDER NOTE - PHYSICAL EXAMINATION
Constitutional: Well developed, well nourished. NAD  TRAUMA: ABC intact. GCS 15.   Head: +left forehead contusion with ecchymosis   Eyes: PERRL. EOMI. No Raccoon eyes.   ENT: No nasal discharge. No septal hematoma. No Silverman sign. Mucous membranes moist.  Neck: Supple. +pain with neck rotation. No midline tenderness, stepoffs.  Cardiovascular: Normal S1, S2. Regular rate and rhythm. No murmurs, rubs, or gallops.  Pulmonary: Normal respiratory rate and effort. Lungs clear to auscultation bilaterally. No wheezing, rales, or rhonchi.  CHEST: No chest wall tenderness, crepitus.  Abdominal: Soft. Nondistended. Nontender. No rebound, guarding, rigidity.  BACK: No saddle paresthesia.  Extremities. Pelvis stable. +left upper leg tenderness. +R shoulder ttp. 2+ rad and dp pulses. knee and elbow rom intact   Skin: No rashes, cyanosis, lacerations, abrasions.  Neuro: AAOx3. Sensation intact throughout. No focal neurological deficits.  Psych: Normal mood. Normal affect. Constitutional: Well developed, well nourished. NAD  TRAUMA: ABC intact. GCS 15.   Head: +left forehead contusion with ecchymosis and overlying hematoma  Eyes: PERRL. EOMI. No Raccoon eyes.   ENT: No nasal discharge. No septal hematoma. No Silverman sign. Mucous membranes moist.  Neck: Supple. +pain with neck rotation. No midline tenderness, stepoffs.  Cardiovascular: Normal S1, S2. Regular rate and rhythm. No murmurs, rubs, or gallops.  Pulmonary: Normal respiratory rate and effort. Lungs clear to auscultation bilaterally. No wheezing, rales, or rhonchi.  CHEST: No chest wall tenderness, crepitus.  Abdominal: Soft. Nondistended. Nontender. No rebound, guarding, rigidity.  BACK: No saddle paresthesia.  Extremities. Pelvis stable. FROM. +left upper leg tenderness. +R shoulder ttp. 2+ rad and dp pulses. knee and elbow rom intact   Skin: No rashes, cyanosis, lacerations, abrasions.  Neuro: AAOx3. Sensation intact throughout. No focal neurological deficits.  Psych: Normal mood. Normal affect.

## 2023-01-19 NOTE — ASU PATIENT PROFILE, ADULT - TEACHING/LEARNING RELIGIOUS CONSIDERATIONS
PAST MEDICAL HISTORY:  CVA (cerebrovascular accident)     Dementia     DM (diabetes mellitus)     
none

## 2023-12-07 ENCOUNTER — INPATIENT (INPATIENT)
Facility: HOSPITAL | Age: 88
LOS: 3 days | Discharge: HOME | End: 2023-12-11
Attending: STUDENT IN AN ORGANIZED HEALTH CARE EDUCATION/TRAINING PROGRAM | Admitting: STUDENT IN AN ORGANIZED HEALTH CARE EDUCATION/TRAINING PROGRAM
Payer: MEDICARE

## 2023-12-07 VITALS
OXYGEN SATURATION: 98 % | RESPIRATION RATE: 24 BRPM | TEMPERATURE: 98 F | SYSTOLIC BLOOD PRESSURE: 140 MMHG | HEART RATE: 85 BPM | DIASTOLIC BLOOD PRESSURE: 84 MMHG

## 2023-12-07 DIAGNOSIS — Z90.3 ACQUIRED ABSENCE OF STOMACH [PART OF]: Chronic | ICD-10-CM

## 2023-12-07 DIAGNOSIS — Z90.49 ACQUIRED ABSENCE OF OTHER SPECIFIED PARTS OF DIGESTIVE TRACT: Chronic | ICD-10-CM

## 2023-12-07 DIAGNOSIS — Z98.41 CATARACT EXTRACTION STATUS, RIGHT EYE: Chronic | ICD-10-CM

## 2023-12-07 DIAGNOSIS — Z98.890 OTHER SPECIFIED POSTPROCEDURAL STATES: Chronic | ICD-10-CM

## 2023-12-07 LAB
ALBUMIN SERPL ELPH-MCNC: 3.4 G/DL — SIGNIFICANT CHANGE UP (ref 3.3–5)
ALBUMIN SERPL ELPH-MCNC: 3.4 G/DL — SIGNIFICANT CHANGE UP (ref 3.3–5)
ALP SERPL-CCNC: 89 U/L — SIGNIFICANT CHANGE UP (ref 40–120)
ALP SERPL-CCNC: 89 U/L — SIGNIFICANT CHANGE UP (ref 40–120)
ALT FLD-CCNC: 51 U/L — HIGH (ref 4–33)
ALT FLD-CCNC: 51 U/L — HIGH (ref 4–33)
ANION GAP SERPL CALC-SCNC: 11 MMOL/L — SIGNIFICANT CHANGE UP (ref 7–14)
ANION GAP SERPL CALC-SCNC: 11 MMOL/L — SIGNIFICANT CHANGE UP (ref 7–14)
ANION GAP SERPL CALC-SCNC: 9 MMOL/L — SIGNIFICANT CHANGE UP (ref 7–14)
ANION GAP SERPL CALC-SCNC: 9 MMOL/L — SIGNIFICANT CHANGE UP (ref 7–14)
APTT BLD: 36.9 SEC — HIGH (ref 24.5–35.6)
APTT BLD: 36.9 SEC — HIGH (ref 24.5–35.6)
AST SERPL-CCNC: 47 U/L — HIGH (ref 4–32)
AST SERPL-CCNC: 47 U/L — HIGH (ref 4–32)
B PERT DNA SPEC QL NAA+PROBE: SIGNIFICANT CHANGE UP
B PERT DNA SPEC QL NAA+PROBE: SIGNIFICANT CHANGE UP
B PERT+PARAPERT DNA PNL SPEC NAA+PROBE: SIGNIFICANT CHANGE UP
B PERT+PARAPERT DNA PNL SPEC NAA+PROBE: SIGNIFICANT CHANGE UP
BASOPHILS # BLD AUTO: 0.05 K/UL — SIGNIFICANT CHANGE UP (ref 0–0.2)
BASOPHILS # BLD AUTO: 0.05 K/UL — SIGNIFICANT CHANGE UP (ref 0–0.2)
BASOPHILS NFR BLD AUTO: 0.5 % — SIGNIFICANT CHANGE UP (ref 0–2)
BASOPHILS NFR BLD AUTO: 0.5 % — SIGNIFICANT CHANGE UP (ref 0–2)
BILIRUB SERPL-MCNC: 0.8 MG/DL — SIGNIFICANT CHANGE UP (ref 0.2–1.2)
BILIRUB SERPL-MCNC: 0.8 MG/DL — SIGNIFICANT CHANGE UP (ref 0.2–1.2)
BLOOD GAS VENOUS COMPREHENSIVE RESULT: SIGNIFICANT CHANGE UP
BLOOD GAS VENOUS COMPREHENSIVE RESULT: SIGNIFICANT CHANGE UP
BORDETELLA PARAPERTUSSIS (RAPRVP): SIGNIFICANT CHANGE UP
BORDETELLA PARAPERTUSSIS (RAPRVP): SIGNIFICANT CHANGE UP
BUN SERPL-MCNC: 28 MG/DL — HIGH (ref 7–23)
BUN SERPL-MCNC: 28 MG/DL — HIGH (ref 7–23)
BUN SERPL-MCNC: 30 MG/DL — HIGH (ref 7–23)
BUN SERPL-MCNC: 30 MG/DL — HIGH (ref 7–23)
C PNEUM DNA SPEC QL NAA+PROBE: SIGNIFICANT CHANGE UP
C PNEUM DNA SPEC QL NAA+PROBE: SIGNIFICANT CHANGE UP
CALCIUM SERPL-MCNC: 9.1 MG/DL — SIGNIFICANT CHANGE UP (ref 8.4–10.5)
CALCIUM SERPL-MCNC: 9.1 MG/DL — SIGNIFICANT CHANGE UP (ref 8.4–10.5)
CALCIUM SERPL-MCNC: 9.3 MG/DL — SIGNIFICANT CHANGE UP (ref 8.4–10.5)
CALCIUM SERPL-MCNC: 9.3 MG/DL — SIGNIFICANT CHANGE UP (ref 8.4–10.5)
CHLORIDE SERPL-SCNC: 102 MMOL/L — SIGNIFICANT CHANGE UP (ref 98–107)
CHLORIDE SERPL-SCNC: 102 MMOL/L — SIGNIFICANT CHANGE UP (ref 98–107)
CHLORIDE SERPL-SCNC: 104 MMOL/L — SIGNIFICANT CHANGE UP (ref 98–107)
CHLORIDE SERPL-SCNC: 104 MMOL/L — SIGNIFICANT CHANGE UP (ref 98–107)
CO2 SERPL-SCNC: 23 MMOL/L — SIGNIFICANT CHANGE UP (ref 22–31)
CO2 SERPL-SCNC: 23 MMOL/L — SIGNIFICANT CHANGE UP (ref 22–31)
CO2 SERPL-SCNC: 26 MMOL/L — SIGNIFICANT CHANGE UP (ref 22–31)
CO2 SERPL-SCNC: 26 MMOL/L — SIGNIFICANT CHANGE UP (ref 22–31)
CREAT SERPL-MCNC: 0.86 MG/DL — SIGNIFICANT CHANGE UP (ref 0.5–1.3)
CREAT SERPL-MCNC: 0.86 MG/DL — SIGNIFICANT CHANGE UP (ref 0.5–1.3)
CREAT SERPL-MCNC: 0.89 MG/DL — SIGNIFICANT CHANGE UP (ref 0.5–1.3)
CREAT SERPL-MCNC: 0.89 MG/DL — SIGNIFICANT CHANGE UP (ref 0.5–1.3)
EGFR: 62 ML/MIN/1.73M2 — SIGNIFICANT CHANGE UP
EGFR: 62 ML/MIN/1.73M2 — SIGNIFICANT CHANGE UP
EGFR: 65 ML/MIN/1.73M2 — SIGNIFICANT CHANGE UP
EGFR: 65 ML/MIN/1.73M2 — SIGNIFICANT CHANGE UP
EOSINOPHIL # BLD AUTO: 0.1 K/UL — SIGNIFICANT CHANGE UP (ref 0–0.5)
EOSINOPHIL # BLD AUTO: 0.1 K/UL — SIGNIFICANT CHANGE UP (ref 0–0.5)
EOSINOPHIL NFR BLD AUTO: 1 % — SIGNIFICANT CHANGE UP (ref 0–6)
EOSINOPHIL NFR BLD AUTO: 1 % — SIGNIFICANT CHANGE UP (ref 0–6)
FLUAV SUBTYP SPEC NAA+PROBE: SIGNIFICANT CHANGE UP
FLUAV SUBTYP SPEC NAA+PROBE: SIGNIFICANT CHANGE UP
FLUBV RNA SPEC QL NAA+PROBE: SIGNIFICANT CHANGE UP
FLUBV RNA SPEC QL NAA+PROBE: SIGNIFICANT CHANGE UP
GLUCOSE SERPL-MCNC: 197 MG/DL — HIGH (ref 70–99)
GLUCOSE SERPL-MCNC: 197 MG/DL — HIGH (ref 70–99)
GLUCOSE SERPL-MCNC: 206 MG/DL — HIGH (ref 70–99)
GLUCOSE SERPL-MCNC: 206 MG/DL — HIGH (ref 70–99)
HADV DNA SPEC QL NAA+PROBE: SIGNIFICANT CHANGE UP
HADV DNA SPEC QL NAA+PROBE: SIGNIFICANT CHANGE UP
HCOV 229E RNA SPEC QL NAA+PROBE: SIGNIFICANT CHANGE UP
HCOV 229E RNA SPEC QL NAA+PROBE: SIGNIFICANT CHANGE UP
HCOV HKU1 RNA SPEC QL NAA+PROBE: SIGNIFICANT CHANGE UP
HCOV HKU1 RNA SPEC QL NAA+PROBE: SIGNIFICANT CHANGE UP
HCOV NL63 RNA SPEC QL NAA+PROBE: SIGNIFICANT CHANGE UP
HCOV NL63 RNA SPEC QL NAA+PROBE: SIGNIFICANT CHANGE UP
HCOV OC43 RNA SPEC QL NAA+PROBE: SIGNIFICANT CHANGE UP
HCOV OC43 RNA SPEC QL NAA+PROBE: SIGNIFICANT CHANGE UP
HCT VFR BLD CALC: 42.7 % — SIGNIFICANT CHANGE UP (ref 34.5–45)
HCT VFR BLD CALC: 42.7 % — SIGNIFICANT CHANGE UP (ref 34.5–45)
HGB BLD-MCNC: 13.8 G/DL — SIGNIFICANT CHANGE UP (ref 11.5–15.5)
HGB BLD-MCNC: 13.8 G/DL — SIGNIFICANT CHANGE UP (ref 11.5–15.5)
HMPV RNA SPEC QL NAA+PROBE: SIGNIFICANT CHANGE UP
HMPV RNA SPEC QL NAA+PROBE: SIGNIFICANT CHANGE UP
HPIV1 RNA SPEC QL NAA+PROBE: SIGNIFICANT CHANGE UP
HPIV1 RNA SPEC QL NAA+PROBE: SIGNIFICANT CHANGE UP
HPIV2 RNA SPEC QL NAA+PROBE: SIGNIFICANT CHANGE UP
HPIV2 RNA SPEC QL NAA+PROBE: SIGNIFICANT CHANGE UP
HPIV3 RNA SPEC QL NAA+PROBE: SIGNIFICANT CHANGE UP
HPIV3 RNA SPEC QL NAA+PROBE: SIGNIFICANT CHANGE UP
HPIV4 RNA SPEC QL NAA+PROBE: SIGNIFICANT CHANGE UP
HPIV4 RNA SPEC QL NAA+PROBE: SIGNIFICANT CHANGE UP
IANC: 8.03 K/UL — HIGH (ref 1.8–7.4)
IANC: 8.03 K/UL — HIGH (ref 1.8–7.4)
IMM GRANULOCYTES NFR BLD AUTO: 0.4 % — SIGNIFICANT CHANGE UP (ref 0–0.9)
IMM GRANULOCYTES NFR BLD AUTO: 0.4 % — SIGNIFICANT CHANGE UP (ref 0–0.9)
INR BLD: 1.06 RATIO — SIGNIFICANT CHANGE UP (ref 0.85–1.18)
INR BLD: 1.06 RATIO — SIGNIFICANT CHANGE UP (ref 0.85–1.18)
LYMPHOCYTES # BLD AUTO: 0.7 K/UL — LOW (ref 1–3.3)
LYMPHOCYTES # BLD AUTO: 0.7 K/UL — LOW (ref 1–3.3)
LYMPHOCYTES # BLD AUTO: 6.9 % — LOW (ref 13–44)
LYMPHOCYTES # BLD AUTO: 6.9 % — LOW (ref 13–44)
M PNEUMO DNA SPEC QL NAA+PROBE: SIGNIFICANT CHANGE UP
M PNEUMO DNA SPEC QL NAA+PROBE: SIGNIFICANT CHANGE UP
MCHC RBC-ENTMCNC: 29.8 PG — SIGNIFICANT CHANGE UP (ref 27–34)
MCHC RBC-ENTMCNC: 29.8 PG — SIGNIFICANT CHANGE UP (ref 27–34)
MCHC RBC-ENTMCNC: 32.3 GM/DL — SIGNIFICANT CHANGE UP (ref 32–36)
MCHC RBC-ENTMCNC: 32.3 GM/DL — SIGNIFICANT CHANGE UP (ref 32–36)
MCV RBC AUTO: 92.2 FL — SIGNIFICANT CHANGE UP (ref 80–100)
MCV RBC AUTO: 92.2 FL — SIGNIFICANT CHANGE UP (ref 80–100)
MONOCYTES # BLD AUTO: 1.21 K/UL — HIGH (ref 0–0.9)
MONOCYTES # BLD AUTO: 1.21 K/UL — HIGH (ref 0–0.9)
MONOCYTES NFR BLD AUTO: 11.9 % — SIGNIFICANT CHANGE UP (ref 2–14)
MONOCYTES NFR BLD AUTO: 11.9 % — SIGNIFICANT CHANGE UP (ref 2–14)
NEUTROPHILS # BLD AUTO: 8.03 K/UL — HIGH (ref 1.8–7.4)
NEUTROPHILS # BLD AUTO: 8.03 K/UL — HIGH (ref 1.8–7.4)
NEUTROPHILS NFR BLD AUTO: 79.3 % — HIGH (ref 43–77)
NEUTROPHILS NFR BLD AUTO: 79.3 % — HIGH (ref 43–77)
NRBC # BLD: 0 /100 WBCS — SIGNIFICANT CHANGE UP (ref 0–0)
NRBC # BLD: 0 /100 WBCS — SIGNIFICANT CHANGE UP (ref 0–0)
NRBC # FLD: 0 K/UL — SIGNIFICANT CHANGE UP (ref 0–0)
NRBC # FLD: 0 K/UL — SIGNIFICANT CHANGE UP (ref 0–0)
NT-PROBNP SERPL-SCNC: 4985 PG/ML — HIGH
NT-PROBNP SERPL-SCNC: 4985 PG/ML — HIGH
PLATELET # BLD AUTO: 222 K/UL — SIGNIFICANT CHANGE UP (ref 150–400)
PLATELET # BLD AUTO: 222 K/UL — SIGNIFICANT CHANGE UP (ref 150–400)
POTASSIUM SERPL-MCNC: 4.1 MMOL/L — SIGNIFICANT CHANGE UP (ref 3.5–5.3)
POTASSIUM SERPL-MCNC: 4.1 MMOL/L — SIGNIFICANT CHANGE UP (ref 3.5–5.3)
POTASSIUM SERPL-MCNC: 5.9 MMOL/L — HIGH (ref 3.5–5.3)
POTASSIUM SERPL-MCNC: 5.9 MMOL/L — HIGH (ref 3.5–5.3)
POTASSIUM SERPL-SCNC: 4.1 MMOL/L — SIGNIFICANT CHANGE UP (ref 3.5–5.3)
POTASSIUM SERPL-SCNC: 4.1 MMOL/L — SIGNIFICANT CHANGE UP (ref 3.5–5.3)
POTASSIUM SERPL-SCNC: 5.9 MMOL/L — HIGH (ref 3.5–5.3)
POTASSIUM SERPL-SCNC: 5.9 MMOL/L — HIGH (ref 3.5–5.3)
PROT SERPL-MCNC: 6.5 G/DL — SIGNIFICANT CHANGE UP (ref 6–8.3)
PROT SERPL-MCNC: 6.5 G/DL — SIGNIFICANT CHANGE UP (ref 6–8.3)
PROTHROM AB SERPL-ACNC: 12 SEC — SIGNIFICANT CHANGE UP (ref 9.5–13)
PROTHROM AB SERPL-ACNC: 12 SEC — SIGNIFICANT CHANGE UP (ref 9.5–13)
RAPID RVP RESULT: SIGNIFICANT CHANGE UP
RAPID RVP RESULT: SIGNIFICANT CHANGE UP
RBC # BLD: 4.63 M/UL — SIGNIFICANT CHANGE UP (ref 3.8–5.2)
RBC # BLD: 4.63 M/UL — SIGNIFICANT CHANGE UP (ref 3.8–5.2)
RBC # FLD: 13.3 % — SIGNIFICANT CHANGE UP (ref 10.3–14.5)
RBC # FLD: 13.3 % — SIGNIFICANT CHANGE UP (ref 10.3–14.5)
RSV RNA SPEC QL NAA+PROBE: SIGNIFICANT CHANGE UP
RSV RNA SPEC QL NAA+PROBE: SIGNIFICANT CHANGE UP
RV+EV RNA SPEC QL NAA+PROBE: SIGNIFICANT CHANGE UP
RV+EV RNA SPEC QL NAA+PROBE: SIGNIFICANT CHANGE UP
SARS-COV-2 RNA SPEC QL NAA+PROBE: SIGNIFICANT CHANGE UP
SARS-COV-2 RNA SPEC QL NAA+PROBE: SIGNIFICANT CHANGE UP
SODIUM SERPL-SCNC: 136 MMOL/L — SIGNIFICANT CHANGE UP (ref 135–145)
SODIUM SERPL-SCNC: 136 MMOL/L — SIGNIFICANT CHANGE UP (ref 135–145)
SODIUM SERPL-SCNC: 139 MMOL/L — SIGNIFICANT CHANGE UP (ref 135–145)
SODIUM SERPL-SCNC: 139 MMOL/L — SIGNIFICANT CHANGE UP (ref 135–145)
TROPONIN T, HIGH SENSITIVITY RESULT: 21 NG/L — SIGNIFICANT CHANGE UP
TROPONIN T, HIGH SENSITIVITY RESULT: 21 NG/L — SIGNIFICANT CHANGE UP
TROPONIN T, HIGH SENSITIVITY RESULT: 23 NG/L — SIGNIFICANT CHANGE UP
TROPONIN T, HIGH SENSITIVITY RESULT: 23 NG/L — SIGNIFICANT CHANGE UP
WBC # BLD: 10.13 K/UL — SIGNIFICANT CHANGE UP (ref 3.8–10.5)
WBC # BLD: 10.13 K/UL — SIGNIFICANT CHANGE UP (ref 3.8–10.5)
WBC # FLD AUTO: 10.13 K/UL — SIGNIFICANT CHANGE UP (ref 3.8–10.5)
WBC # FLD AUTO: 10.13 K/UL — SIGNIFICANT CHANGE UP (ref 3.8–10.5)

## 2023-12-07 PROCEDURE — 71045 X-RAY EXAM CHEST 1 VIEW: CPT | Mod: 26

## 2023-12-07 PROCEDURE — 71275 CT ANGIOGRAPHY CHEST: CPT | Mod: 26,MA

## 2023-12-07 PROCEDURE — 99285 EMERGENCY DEPT VISIT HI MDM: CPT

## 2023-12-07 RX ORDER — DIPHENHYDRAMINE HCL 50 MG
50 CAPSULE ORAL ONCE
Refills: 0 | Status: COMPLETED | OUTPATIENT
Start: 2023-12-07 | End: 2023-12-07

## 2023-12-07 RX ORDER — FUROSEMIDE 40 MG
40 TABLET ORAL ONCE
Refills: 0 | Status: COMPLETED | OUTPATIENT
Start: 2023-12-07 | End: 2023-12-07

## 2023-12-07 RX ADMIN — Medication 80 MILLIGRAM(S): at 19:35

## 2023-12-07 RX ADMIN — Medication 50 MILLIGRAM(S): at 22:36

## 2023-12-07 RX ADMIN — Medication 40 MILLIGRAM(S): at 18:13

## 2023-12-07 NOTE — ED ADULT NURSE REASSESSMENT NOTE - NS ED NURSE REASSESS COMMENT FT1
Report received from RM Buitrago. 3L NC noted at changes of shift, 99% O2 saturation. respirations even and unlabored. HOB elevated. No acute distress noted. Bed in lowest position, call bell in hand, wheels locked, safety maintained. Awaiting further orders.

## 2023-12-07 NOTE — ED PROVIDER NOTE - CLINICAL SUMMARY MEDICAL DECISION MAKING FREE TEXT BOX
88-year-old female past medical history of COPD, not on home O2, hypertension, aortic valve replacement not on anticoagulation at this point, here complaining of worsening shortness of breath x 3 weeks.  States she is now unable to ambulate due to significant dyspnea.  Has developed a cough for the last few days.  Denies fevers, chills, chest pain.  No history of similar symptoms in the past.  Patient does have a history of PE in the past status post surgery.  On exam patient appears tachypneic, pulse ox in the low 90s, improved to high 90s on 3 L nasal cannula.  Suspicion for pneumonia as patient has rales on the right side on exam, however will rule out PE with CT angio chest.  Patient admits to a contrast allergy, unsure if it is MRI or CT contrast.  Would like to be premedicated regardless.  Will obtain chest x-ray, labs, troponin, will require admission given hypoxia.

## 2023-12-07 NOTE — ED ADULT TRIAGE NOTE - CHIEF COMPLAINT QUOTE
Pt c/o worsening shortness of breath x 2 weeks. Hypoxic on RA SpO2 90%, placed on 6L NC. SpO2 improved to 98% on 6L NC. Received 1 nebulizer treatment in route. Denies chest pain, sick contacts. PHx DM2, heart valve replacement 2019, HTN

## 2023-12-07 NOTE — ED ADULT NURSE REASSESSMENT NOTE - NS ED NURSE REASSESS COMMENT FT1
break coverage rn. received report from RN. pt A&Ox4, vitally stable on 2L NC. 20G IV placed to R AC septic workup sent. Resp even unlabored on oxygen. Denies othercomplaints. awaiting scans at this time

## 2023-12-07 NOTE — ED PROVIDER NOTE - NS ED ATTENDING STATEMENT MOD
This was a shared visit with the JAYDEN. I reviewed and verified the documentation and independently performed the documented:

## 2023-12-07 NOTE — ED ADULT NURSE NOTE - NSFALLRISKINTERV_ED_ALL_ED
Assistance OOB with selected safe patient handling equipment if applicable/Communicate fall risk and risk factors to all staff, patient, and family/Provide visual cue: yellow wristband, yellow gown, etc/Reinforce activity limits and safety measures with patient and family/Call bell, personal items and telephone in reach/Instruct patient to call for assistance before getting out of bed/chair/stretcher/Non-slip footwear applied when patient is off stretcher/[place] to call system/Physically safe environment - no spills, clutter or unnecessary equipment/Purposeful Proactive Rounding/Room/bathroom lighting operational, light cord in reach Assistance OOB with selected safe patient handling equipment if applicable/Communicate fall risk and risk factors to all staff, patient, and family/Provide visual cue: yellow wristband, yellow gown, etc/Reinforce activity limits and safety measures with patient and family/Call bell, personal items and telephone in reach/Instruct patient to call for assistance before getting out of bed/chair/stretcher/Non-slip footwear applied when patient is off stretcher/[place] to call system/Physically safe environment - no spills, clutter or unnecessary equipment/Purposeful Proactive Rounding/Room/bathroom lighting operational, light cord in reach

## 2023-12-07 NOTE — ED ADULT NURSE NOTE - OBJECTIVE STATEMENT
Pt awake and alert, ambulatory with one assist Phx Valve replacement in 2019, COPD presenting to ED for worsening shortness of breath. Pt admits to trouble breathing starting 3 weeks ago worsening today. Pt states while on phone with daughter pt "couldn't catch my breath" Pt mildly tachypneic in ED 02 sat at 96% on RA, pt placed on 02 via NC for comfort as per MD order, Pt placed on monitor, NSR noted. Call bell in reach, IV placed, labs sent. Awaiting imaging and further evaluation.

## 2023-12-07 NOTE — ED PROVIDER NOTE - ATTENDING APP SHARED VISIT CONTRIBUTION OF CARE
88-year-old female past medical history COPD hypertension TAVR, mitral stenosis not repaired presents for worsening shortness of breath progressing over the past several weeks associated with cough.  She denies fever, chills, sick contacts.  Denies abdominal pain nausea vomiting.  Exam as above  Plan labs, chest x-ray, telemetry, supplemental O2, CT, reassess.

## 2023-12-08 DIAGNOSIS — K21.9 GASTRO-ESOPHAGEAL REFLUX DISEASE WITHOUT ESOPHAGITIS: ICD-10-CM

## 2023-12-08 DIAGNOSIS — I50.9 HEART FAILURE, UNSPECIFIED: ICD-10-CM

## 2023-12-08 DIAGNOSIS — Z86.711 PERSONAL HISTORY OF PULMONARY EMBOLISM: ICD-10-CM

## 2023-12-08 DIAGNOSIS — Z87.39 PERSONAL HISTORY OF OTHER DISEASES OF THE MUSCULOSKELETAL SYSTEM AND CONNECTIVE TISSUE: ICD-10-CM

## 2023-12-08 DIAGNOSIS — J44.9 CHRONIC OBSTRUCTIVE PULMONARY DISEASE, UNSPECIFIED: ICD-10-CM

## 2023-12-08 DIAGNOSIS — R73.01 IMPAIRED FASTING GLUCOSE: ICD-10-CM

## 2023-12-08 DIAGNOSIS — Z95.2 PRESENCE OF PROSTHETIC HEART VALVE: ICD-10-CM

## 2023-12-08 DIAGNOSIS — Z29.9 ENCOUNTER FOR PROPHYLACTIC MEASURES, UNSPECIFIED: ICD-10-CM

## 2023-12-08 LAB
A1C WITH ESTIMATED AVERAGE GLUCOSE RESULT: 7.3 % — HIGH (ref 4–5.6)
A1C WITH ESTIMATED AVERAGE GLUCOSE RESULT: 7.3 % — HIGH (ref 4–5.6)
ALBUMIN SERPL ELPH-MCNC: 3.7 G/DL — SIGNIFICANT CHANGE UP (ref 3.3–5)
ALBUMIN SERPL ELPH-MCNC: 3.7 G/DL — SIGNIFICANT CHANGE UP (ref 3.3–5)
ALP SERPL-CCNC: 90 U/L — SIGNIFICANT CHANGE UP (ref 40–120)
ALP SERPL-CCNC: 90 U/L — SIGNIFICANT CHANGE UP (ref 40–120)
ALT FLD-CCNC: 42 U/L — HIGH (ref 4–33)
ALT FLD-CCNC: 42 U/L — HIGH (ref 4–33)
ANION GAP SERPL CALC-SCNC: 12 MMOL/L — SIGNIFICANT CHANGE UP (ref 7–14)
ANION GAP SERPL CALC-SCNC: 12 MMOL/L — SIGNIFICANT CHANGE UP (ref 7–14)
AST SERPL-CCNC: 24 U/L — SIGNIFICANT CHANGE UP (ref 4–32)
AST SERPL-CCNC: 24 U/L — SIGNIFICANT CHANGE UP (ref 4–32)
BILIRUB SERPL-MCNC: 0.9 MG/DL — SIGNIFICANT CHANGE UP (ref 0.2–1.2)
BILIRUB SERPL-MCNC: 0.9 MG/DL — SIGNIFICANT CHANGE UP (ref 0.2–1.2)
BUN SERPL-MCNC: 29 MG/DL — HIGH (ref 7–23)
BUN SERPL-MCNC: 29 MG/DL — HIGH (ref 7–23)
CALCIUM SERPL-MCNC: 9.2 MG/DL — SIGNIFICANT CHANGE UP (ref 8.4–10.5)
CALCIUM SERPL-MCNC: 9.2 MG/DL — SIGNIFICANT CHANGE UP (ref 8.4–10.5)
CHLORIDE SERPL-SCNC: 103 MMOL/L — SIGNIFICANT CHANGE UP (ref 98–107)
CHLORIDE SERPL-SCNC: 103 MMOL/L — SIGNIFICANT CHANGE UP (ref 98–107)
CO2 SERPL-SCNC: 24 MMOL/L — SIGNIFICANT CHANGE UP (ref 22–31)
CO2 SERPL-SCNC: 24 MMOL/L — SIGNIFICANT CHANGE UP (ref 22–31)
CREAT SERPL-MCNC: 0.91 MG/DL — SIGNIFICANT CHANGE UP (ref 0.5–1.3)
CREAT SERPL-MCNC: 0.91 MG/DL — SIGNIFICANT CHANGE UP (ref 0.5–1.3)
EGFR: 61 ML/MIN/1.73M2 — SIGNIFICANT CHANGE UP
EGFR: 61 ML/MIN/1.73M2 — SIGNIFICANT CHANGE UP
ESTIMATED AVERAGE GLUCOSE: 163 — SIGNIFICANT CHANGE UP
ESTIMATED AVERAGE GLUCOSE: 163 — SIGNIFICANT CHANGE UP
GLUCOSE SERPL-MCNC: 287 MG/DL — HIGH (ref 70–99)
GLUCOSE SERPL-MCNC: 287 MG/DL — HIGH (ref 70–99)
HCT VFR BLD CALC: 42.2 % — SIGNIFICANT CHANGE UP (ref 34.5–45)
HCT VFR BLD CALC: 42.2 % — SIGNIFICANT CHANGE UP (ref 34.5–45)
HGB BLD-MCNC: 13.5 G/DL — SIGNIFICANT CHANGE UP (ref 11.5–15.5)
HGB BLD-MCNC: 13.5 G/DL — SIGNIFICANT CHANGE UP (ref 11.5–15.5)
MAGNESIUM SERPL-MCNC: 2 MG/DL — SIGNIFICANT CHANGE UP (ref 1.6–2.6)
MAGNESIUM SERPL-MCNC: 2 MG/DL — SIGNIFICANT CHANGE UP (ref 1.6–2.6)
MCHC RBC-ENTMCNC: 29.7 PG — SIGNIFICANT CHANGE UP (ref 27–34)
MCHC RBC-ENTMCNC: 29.7 PG — SIGNIFICANT CHANGE UP (ref 27–34)
MCHC RBC-ENTMCNC: 32 GM/DL — SIGNIFICANT CHANGE UP (ref 32–36)
MCHC RBC-ENTMCNC: 32 GM/DL — SIGNIFICANT CHANGE UP (ref 32–36)
MCV RBC AUTO: 93 FL — SIGNIFICANT CHANGE UP (ref 80–100)
MCV RBC AUTO: 93 FL — SIGNIFICANT CHANGE UP (ref 80–100)
NRBC # BLD: 0 /100 WBCS — SIGNIFICANT CHANGE UP (ref 0–0)
NRBC # BLD: 0 /100 WBCS — SIGNIFICANT CHANGE UP (ref 0–0)
NRBC # FLD: 0 K/UL — SIGNIFICANT CHANGE UP (ref 0–0)
NRBC # FLD: 0 K/UL — SIGNIFICANT CHANGE UP (ref 0–0)
PHOSPHATE SERPL-MCNC: 4.2 MG/DL — SIGNIFICANT CHANGE UP (ref 2.5–4.5)
PHOSPHATE SERPL-MCNC: 4.2 MG/DL — SIGNIFICANT CHANGE UP (ref 2.5–4.5)
PLATELET # BLD AUTO: 194 K/UL — SIGNIFICANT CHANGE UP (ref 150–400)
PLATELET # BLD AUTO: 194 K/UL — SIGNIFICANT CHANGE UP (ref 150–400)
POTASSIUM SERPL-MCNC: 4.7 MMOL/L — SIGNIFICANT CHANGE UP (ref 3.5–5.3)
POTASSIUM SERPL-MCNC: 4.7 MMOL/L — SIGNIFICANT CHANGE UP (ref 3.5–5.3)
POTASSIUM SERPL-SCNC: 4.7 MMOL/L — SIGNIFICANT CHANGE UP (ref 3.5–5.3)
POTASSIUM SERPL-SCNC: 4.7 MMOL/L — SIGNIFICANT CHANGE UP (ref 3.5–5.3)
PROT SERPL-MCNC: 6.5 G/DL — SIGNIFICANT CHANGE UP (ref 6–8.3)
PROT SERPL-MCNC: 6.5 G/DL — SIGNIFICANT CHANGE UP (ref 6–8.3)
RBC # BLD: 4.54 M/UL — SIGNIFICANT CHANGE UP (ref 3.8–5.2)
RBC # BLD: 4.54 M/UL — SIGNIFICANT CHANGE UP (ref 3.8–5.2)
RBC # FLD: 13.1 % — SIGNIFICANT CHANGE UP (ref 10.3–14.5)
RBC # FLD: 13.1 % — SIGNIFICANT CHANGE UP (ref 10.3–14.5)
SODIUM SERPL-SCNC: 139 MMOL/L — SIGNIFICANT CHANGE UP (ref 135–145)
SODIUM SERPL-SCNC: 139 MMOL/L — SIGNIFICANT CHANGE UP (ref 135–145)
WBC # BLD: 6.12 K/UL — SIGNIFICANT CHANGE UP (ref 3.8–10.5)
WBC # BLD: 6.12 K/UL — SIGNIFICANT CHANGE UP (ref 3.8–10.5)
WBC # FLD AUTO: 6.12 K/UL — SIGNIFICANT CHANGE UP (ref 3.8–10.5)
WBC # FLD AUTO: 6.12 K/UL — SIGNIFICANT CHANGE UP (ref 3.8–10.5)

## 2023-12-08 PROCEDURE — 99223 1ST HOSP IP/OBS HIGH 75: CPT

## 2023-12-08 PROCEDURE — 93010 ELECTROCARDIOGRAM REPORT: CPT

## 2023-12-08 PROCEDURE — 93306 TTE W/DOPPLER COMPLETE: CPT | Mod: 26

## 2023-12-08 PROCEDURE — 99497 ADVNCD CARE PLAN 30 MIN: CPT

## 2023-12-08 RX ORDER — INFLUENZA VIRUS VACCINE 15; 15; 15; 15 UG/.5ML; UG/.5ML; UG/.5ML; UG/.5ML
0.7 SUSPENSION INTRAMUSCULAR ONCE
Refills: 0 | Status: DISCONTINUED | OUTPATIENT
Start: 2023-12-08 | End: 2023-12-11

## 2023-12-08 RX ORDER — DAPAGLIFLOZIN 10 MG/1
1 TABLET, FILM COATED ORAL
Refills: 0 | DISCHARGE

## 2023-12-08 RX ORDER — ATORVASTATIN CALCIUM 80 MG/1
1 TABLET, FILM COATED ORAL
Refills: 0 | DISCHARGE

## 2023-12-08 RX ORDER — ASPIRIN/CALCIUM CARB/MAGNESIUM 324 MG
81 TABLET ORAL DAILY
Refills: 0 | Status: DISCONTINUED | OUTPATIENT
Start: 2023-12-08 | End: 2023-12-11

## 2023-12-08 RX ORDER — AMLODIPINE BESYLATE 2.5 MG/1
5 TABLET ORAL DAILY
Refills: 0 | Status: DISCONTINUED | OUTPATIENT
Start: 2023-12-08 | End: 2023-12-09

## 2023-12-08 RX ORDER — BUPROPION HYDROCHLORIDE 150 MG/1
150 TABLET, EXTENDED RELEASE ORAL DAILY
Refills: 0 | Status: DISCONTINUED | OUTPATIENT
Start: 2023-12-08 | End: 2023-12-08

## 2023-12-08 RX ORDER — GABAPENTIN 400 MG/1
1 CAPSULE ORAL
Qty: 0 | Refills: 0 | DISCHARGE

## 2023-12-08 RX ORDER — ACETAMINOPHEN 500 MG
650 TABLET ORAL EVERY 6 HOURS
Refills: 0 | Status: DISCONTINUED | OUTPATIENT
Start: 2023-12-08 | End: 2023-12-11

## 2023-12-08 RX ORDER — HEPARIN SODIUM 5000 [USP'U]/ML
5000 INJECTION INTRAVENOUS; SUBCUTANEOUS EVERY 12 HOURS
Refills: 0 | Status: DISCONTINUED | OUTPATIENT
Start: 2023-12-08 | End: 2023-12-09

## 2023-12-08 RX ORDER — ENOXAPARIN SODIUM 100 MG/ML
30 INJECTION SUBCUTANEOUS EVERY 24 HOURS
Refills: 0 | Status: DISCONTINUED | OUTPATIENT
Start: 2023-12-08 | End: 2023-12-08

## 2023-12-08 RX ORDER — BUPROPION HYDROCHLORIDE 150 MG/1
1 TABLET, EXTENDED RELEASE ORAL
Refills: 0 | DISCHARGE

## 2023-12-08 RX ORDER — BUPROPION HYDROCHLORIDE 150 MG/1
75 TABLET, EXTENDED RELEASE ORAL
Refills: 0 | Status: DISCONTINUED | OUTPATIENT
Start: 2023-12-08 | End: 2023-12-11

## 2023-12-08 RX ORDER — ASPIRIN/CALCIUM CARB/MAGNESIUM 324 MG
1 TABLET ORAL
Qty: 0 | Refills: 0 | DISCHARGE

## 2023-12-08 RX ORDER — GABAPENTIN 400 MG/1
600 CAPSULE ORAL
Refills: 0 | Status: DISCONTINUED | OUTPATIENT
Start: 2023-12-08 | End: 2023-12-09

## 2023-12-08 RX ORDER — CITALOPRAM 10 MG/1
1 TABLET, FILM COATED ORAL
Qty: 0 | Refills: 0 | DISCHARGE

## 2023-12-08 RX ORDER — ATORVASTATIN CALCIUM 80 MG/1
20 TABLET, FILM COATED ORAL AT BEDTIME
Refills: 0 | Status: DISCONTINUED | OUTPATIENT
Start: 2023-12-08 | End: 2023-12-11

## 2023-12-08 RX ORDER — ALBUTEROL 90 UG/1
2 AEROSOL, METERED ORAL EVERY 6 HOURS
Refills: 0 | Status: DISCONTINUED | OUTPATIENT
Start: 2023-12-08 | End: 2023-12-11

## 2023-12-08 RX ORDER — OMEPRAZOLE 10 MG/1
1 CAPSULE, DELAYED RELEASE ORAL
Qty: 0 | Refills: 0 | DISCHARGE

## 2023-12-08 RX ORDER — FUROSEMIDE 40 MG
40 TABLET ORAL DAILY
Refills: 0 | Status: DISCONTINUED | OUTPATIENT
Start: 2023-12-08 | End: 2023-12-08

## 2023-12-08 RX ORDER — TRAMADOL HYDROCHLORIDE 50 MG/1
50 TABLET ORAL DAILY
Refills: 0 | Status: DISCONTINUED | OUTPATIENT
Start: 2023-12-08 | End: 2023-12-11

## 2023-12-08 RX ORDER — LANOLIN ALCOHOL/MO/W.PET/CERES
3 CREAM (GRAM) TOPICAL AT BEDTIME
Refills: 0 | Status: DISCONTINUED | OUTPATIENT
Start: 2023-12-08 | End: 2023-12-11

## 2023-12-08 RX ORDER — METOPROLOL TARTRATE 50 MG
25 TABLET ORAL DAILY
Refills: 0 | Status: DISCONTINUED | OUTPATIENT
Start: 2023-12-08 | End: 2023-12-09

## 2023-12-08 RX ORDER — PANTOPRAZOLE SODIUM 20 MG/1
40 TABLET, DELAYED RELEASE ORAL
Refills: 0 | Status: DISCONTINUED | OUTPATIENT
Start: 2023-12-08 | End: 2023-12-11

## 2023-12-08 RX ORDER — FUROSEMIDE 40 MG
40 TABLET ORAL
Refills: 0 | Status: DISCONTINUED | OUTPATIENT
Start: 2023-12-08 | End: 2023-12-09

## 2023-12-08 RX ORDER — FERROUS SULFATE 325(65) MG
1 TABLET ORAL
Qty: 0 | Refills: 0 | DISCHARGE

## 2023-12-08 RX ORDER — ENOXAPARIN SODIUM 100 MG/ML
40 INJECTION SUBCUTANEOUS EVERY 24 HOURS
Refills: 0 | Status: DISCONTINUED | OUTPATIENT
Start: 2023-12-08 | End: 2023-12-08

## 2023-12-08 RX ADMIN — ATORVASTATIN CALCIUM 20 MILLIGRAM(S): 80 TABLET, FILM COATED ORAL at 22:22

## 2023-12-08 RX ADMIN — Medication 10 MILLIGRAM(S): at 11:12

## 2023-12-08 RX ADMIN — PANTOPRAZOLE SODIUM 40 MILLIGRAM(S): 20 TABLET, DELAYED RELEASE ORAL at 05:29

## 2023-12-08 RX ADMIN — BUPROPION HYDROCHLORIDE 75 MILLIGRAM(S): 150 TABLET, EXTENDED RELEASE ORAL at 11:13

## 2023-12-08 RX ADMIN — BUPROPION HYDROCHLORIDE 75 MILLIGRAM(S): 150 TABLET, EXTENDED RELEASE ORAL at 22:22

## 2023-12-08 RX ADMIN — Medication 25 MILLIGRAM(S): at 05:30

## 2023-12-08 RX ADMIN — GABAPENTIN 600 MILLIGRAM(S): 400 CAPSULE ORAL at 11:12

## 2023-12-08 RX ADMIN — Medication 81 MILLIGRAM(S): at 11:13

## 2023-12-08 RX ADMIN — AMLODIPINE BESYLATE 5 MILLIGRAM(S): 2.5 TABLET ORAL at 05:30

## 2023-12-08 RX ADMIN — Medication 40 MILLIGRAM(S): at 11:12

## 2023-12-08 RX ADMIN — GABAPENTIN 600 MILLIGRAM(S): 400 CAPSULE ORAL at 22:22

## 2023-12-08 NOTE — H&P ADULT - PROBLEM SELECTOR PLAN 1
No LE edema, weight gain but with SOB/BROCK, elevated probnp, mild crackles on exam, small bilateral pleural effusions on CT c/w ADHF. CTA neg for PE  Has hx TAVR and severe MR  -tele,   -lasix 40mg IV qd  -wean O2 as tolerated. She is not on chronic O2 therapy  -c/w metoprolol 25mg qd  -cardiology c/s in AM No significant LE edema but with SOB/BROCK, elevated probnp, mild crackles on exam, small bilateral pleural effusions on CT c/w ADHF. CTA neg for PE  Has hx TAVR and severe MR  -tele,   -echo ordered  -lasix 40mg IV qd  -wean O2 as tolerated. She is not on chronic O2 therapy  -c/w metoprolol 25mg qd  -cardiology c/s in AM

## 2023-12-08 NOTE — GOALS OF CARE CONVERSATION - ADVANCED CARE PLANNING - CONVERSATION DETAILS
Patient reports has no prior GOC/ACP states it is not something she has discussed with her family.  Educated on what it encompasses.  States does not think she would want "all that" including CPR and intubation but does not feel ready to make that decision on her own at this time, wants to discuss with her  Reinaldo.  States he can decide.  Advised that though Reinaldo can be her HCP per her wishes his decisions should be based on her wishes thus it would be important for her to discuss those wishes prior and not make him make decision without information.  Patient states she will talk with him further and also her son.

## 2023-12-08 NOTE — PROGRESS NOTE ADULT - PROBLEM SELECTOR PLAN 1
No significant LE edema but with SOB/BROCK, elevated probnp, mild crackles on exam, small bilateral pleural effusions.  CT neg for PE.   Has hx TAVR and severe MR.  2019 normal EF 70%--stage 2 diastolic HF.   - c/w tele  - c/w Lasix IV, 40 mg BID  - wean O2 as tolerated. She is not on chronic O2 therapy  - c/w metoprolol 25mg qd  - consider cardiology c/s vs close OP f/u on dc with OP cards No significant LE edema but with SOB/BROCK, elevated probnp, mild crackles on exam, small bilateral pleural effusions.  CT neg for PE.   Has hx TAVR and severe MR.  2019 normal EF 70%--stage 2 diastolic HF.   - c/w tele  - c/w Lasix IV, 40 mg BID  - wean O2 as tolerated, not on baseline home O2  - c/w metoprolol 25mg qd  - can likely f/u OP w/ her cards next week

## 2023-12-08 NOTE — PATIENT PROFILE ADULT - FALL HARM RISK - HARM RISK INTERVENTIONS
Assistance OOB with selected safe patient handling equipment/Communicate Risk of Fall with Harm to all staff/Discuss with provider need for PT consult/Monitor gait and stability/Provide patient with walking aids - walker, cane, crutches/Reinforce activity limits and safety measures with patient and family/Tailored Fall Risk Interventions/Visual Cue: Yellow wristband and red socks/Bed in lowest position, wheels locked, appropriate side rails in place/Call bell, personal items and telephone in reach/Instruct patient to call for assistance before getting out of bed or chair/Non-slip footwear when patient is out of bed/[place] to call system/Physically safe environment - no spills, clutter or unnecessary equipment/Purposeful Proactive Rounding/Room/bathroom lighting operational, light cord in reach Assistance OOB with selected safe patient handling equipment/Communicate Risk of Fall with Harm to all staff/Discuss with provider need for PT consult/Monitor gait and stability/Provide patient with walking aids - walker, cane, crutches/Reinforce activity limits and safety measures with patient and family/Tailored Fall Risk Interventions/Visual Cue: Yellow wristband and red socks/Bed in lowest position, wheels locked, appropriate side rails in place/Call bell, personal items and telephone in reach/Instruct patient to call for assistance before getting out of bed or chair/Non-slip footwear when patient is out of bed/[place] to call system/Physically safe environment - no spills, clutter or unnecessary equipment/Purposeful Proactive Rounding/Room/bathroom lighting operational, light cord in reach

## 2023-12-08 NOTE — PROGRESS NOTE ADULT - PROBLEM SELECTOR PLAN 5
Appears to have been provoked in setting of back surgery. No longer on AC. Completed AC in 2018  - CTPA neg for PE Appears to have been provoked in setting of back surgery. No longer on AC. Completed AC in 2018  - CTPA neg for PE (got premedication for contrast allergy)

## 2023-12-08 NOTE — H&P ADULT - HISTORY OF PRESENT ILLNESS
88F with PMHx COPD (no home O2), HTN, TAVR 2019, GERD, hx spinal stenosis s/p fusion 2017 c/b PE (completed AC) presenting with SOB x3 weeks. The patient notes worsening SOB, BROCK gradually over several weeks. She denies LE edema but notes some intermittent orthopnea. She also has some dizziness with ambulation at times but no syncope. She denies fevers, chills, cough, abdominal pain, vomiting, diarrhea, dysuria, hematuria. This does not feel like COPD exacerbation and she does not take inhalers daily.    In ED give lasix 40mg IV and noted to have some improvement. Noted to have transient hypoxia in ED to low 90s improved with 2L NC. Also received benadryl and methylprednisolone as pretreatment for CTA chest. CTA chest neg for PE    Home meds confirmed with  [phone]  Farxiga 5mg qd  Amlodipine 5mg qd  Metoprolol 25mg qd  Paroxetine 12.5mg qd  Tramadol 50mg? qd PRN (unsure dose)  Gabapentin 600mg BID  Bupropion 75mg BID  Omeprazole 20mg qd  Atorvastatin 20mg qd  ASA 81mg qd 88F with PMHx COPD (no home O2), HTN, TAVR 2019, GERD, hx spinal stenosis s/p fusion 2017 c/b PE (completed AC) presenting with SOB x3 weeks. The patient notes worsening SOB, BROCK gradually over several weeks. She denies LE edema but notes some intermittent orthopnea. She also has some dizziness with ambulation at times but no syncope. She denies fevers, chills, cough, abdominal pain, vomiting, diarrhea, dysuria, hematuria. This does not feel like COPD exacerbation and she does not take inhalers daily.    In ED give lasix 40mg IV and noted to have some improvement. Noted to have transient hypoxia in ED to low 90s improved with 2L NC. Also received benadryl and methylprednisolone as pretreatment for CTA chest. CTA chest neg for PE    Home meds confirmed with  [phone]  Farxiga 5mg qd  Amlodipine 5mg qd  Metoprolol 25mg qd  Paroxetine 12.5mg qd  Tramadol 50mg? qd PRN (unsure dose)  Gabapentin 600mg BID  Bupropion 75mg BID  Omeprazole 20mg qd  Atorvastatin 20mg qd  ASA 81mg qd

## 2023-12-08 NOTE — H&P ADULT - NSHPLABSRESULTS_GEN_ALL_CORE
Personally reviewed labs:                        13.8   10.13 )-----------( 222      ( 07 Dec 2023 16:49 )             42.7     12-07-23 @ 19:40    139  |  102  |  28<H>             --------------------------< 197<H>     4.1  |  26  | 0.86    eGFR AA: --  eGFR N-AA: --    Calcium: 9.1  Phosphorus: --  Magnesium: --    AST: --    ALT: --  AlkPhos: --  Protein: --  Albumin: --  TBili: --  D-Bili: --  12-07-23 @ 16:49    136  |  104  |  30<H>             --------------------------< 206<H>     5.9<H>  |  23  | 0.89    eGFR AA: --  eGFR N-AA: --    Calcium: 9.3  Phosphorus: --  Magnesium: --    AST: 47<H>    ALT: 51<H>  AlkPhos: 89  Protein: 6.5  Albumin: 3.4  TBili: 0.8  D-Bili: --    VBG - ( 07 Dec 2023 16:49 )  pH: 7.32  /  pCO2: 51    /  pO2: 25    / HCO3: 26    / Base Excess: -0.6  /  SvO2: 25.6  / Lactate: 1.2        Troponin 23--21    RADIOLOGY & ADDITIONAL TESTS:    EKG my independent interpretation: NSR @82bpm, no STD or DANITA    Imaging personally reviewed:  CTA chest:  IMPRESSION:    1. No acute pulmonary embolus.  2. Small bilateral pleural effusions.

## 2023-12-08 NOTE — PATIENT PROFILE ADULT - NSPRONUTRITIONRISK_GEN_A_NUR
MEDICARE WELLNESS VISIT NOTE      HISTORY OF PRESENT ILLNESS:   Verena Noland presents for her Subsequent Annual Medicare Wellness Visit.   She has no current complaints or concerns.      Patient Care Team:  Sherwin Barnett MD as PCP - General (Internal Medicine)  Dr. Albert as Ophthalmology  Dr. Sonny Valente as Dentist- General Practice  Dr. Chery as Gynecologist  Sr. Flynn as Podiatry        Patient Active Problem List    Diagnosis Date Noted   • Pure hypercholesterolemia 10/24/2016     Priority: Low   • Mild cognitive impairment 10/24/2016     Priority: Low     20 on SLUMS test 10/24/2016  4/2017: [facility] neurology letter indicates MCI.  5/2017:  [facility] Mental Status (UMS) Exam:   Total score 18.    Sees Dr. Sherwin Reid, Dementia Specialist with [facility], as advised by Dr. Johnson.     • Osteopenia 05/12/2016     Priority: Low     5/11/2016:  Bone Density:  Lumbar spine (-2.1),  Left femoral neck (-2.2),  Right femoral neck (-2.1)     • ASHD (arteriosclerotic heart disease) 04/03/2012     Priority: Low     CAC score diagnosis     • BENIGN HYP KID DIS,W/O CHR KID DIS 04/03/2012     Priority: Low   • IFG (impaired fasting glucose) 04/03/2012     Priority: Low   • Vitamin D deficiency 04/03/2012     Priority: Low         Past Medical History:   Diagnosis Date   • CAD (coronary artery disease)     EBCT diagnosis   • Cataract     no surgical intervention as of 4/2016.   • IFG (impaired fasting glucose)    • Other and unspecified hyperlipidemia    • Unspecified essential hypertension    • Vitamin D deficiency          Past Surgical History:   Procedure Laterality Date   • Carpal tunnel release Left 2005   • Carpal tunnel release Right 2005   • Colonoscopy diagnostic  06/12/2008    Colonoscopy, Dx.  Repeat 10 years.   • Dexa bone density axial skeleton  05/11/2016   • Hepatitis c antibody  12/14/2015    Negative   • Hpv  2007    negative 2007 and 2010   • Hysteroscopy,biopsy  04/2004   •  Hysteroscopy,biopsy  08/2006    [facility]   • Mammo screening bilateral  08/2015    normal.           Social History   Substance Use Topics   • Smoking status: Never Smoker   • Smokeless tobacco: Never Used   • Alcohol use 0.0 oz/week      Comment: Rare.  Once monthly.     Drug use:    Drug Use:    No              Family History   Problem Relation Age of Onset   • Heart disease Mother      CABG, carotid endarterectomy   • Cancer Mother      Lymphoma   • COPD Father    • Cancer, Prostate Father    • Cancer Father      Larynx   • Vascular Father      Carotid endarterectomy   • Dementia/Alzheimers Father 84     Vascular   • Stroke Father    • Cancer Brother      pancreatic       Current Outpatient Prescriptions   Medication Sig Dispense Refill   • spironolactone-hydrochlorothiazide (ALDACTAZIDE) 25-25 MG per tablet TAKE 1 TABLET BY MOUTH  DAILY 90 tablet 3   • rosuvastatin (CRESTOR) 5 MG tablet TAKE 1 TABLET BY MOUTH  DAILY 90 tablet 2   • memantine (NAMENDA) 10 MG tablet Take 10 mg by mouth nightly.     • donepezil (ARICEPT) 5 MG tablet Take 5 mg by mouth nightly.     • aspirin 81 MG tablet Take 81 mg by mouth daily.     • cholecalciferol (VITAMIN D3) 1000 UNITS tablet Take 2 tablets by mouth daily.     • Multiple Vitamins-Minerals CAPS Take 1 capsule by mouth daily.     • Coenzyme Q10 200 MG CAPS Take 1 capsule by mouth daily.       No current facility-administered medications for this visit.         Medicare Health Risk Assessment in electronic health record reviewed. The following items were identified and addressed:    No risks were identified  Vision and hearing screens documented:    Advance Directive: Patient has an Advance Directives document, which is on file  Cognitive Assessment: preexisting cognitive issues - stable    [employer] [facility] Mental Status ([facility]) Exam:    1.  What day of the week is it?  (1 point)   1  2.  What is the year?  (1 point)   1  3.  What state are we in?  (1 point)   1  4.   Remember these 5 objects.  I will ask you to what they are later:  Apple, pen, tie, house, car.  (No points yet).  5.  You have $100 and you go to the store and buy dozen apples for $3 and a tricycle for $20.   How much did you spend?  (1 point)   1   How much do you have left?  (2 points)   2  6.  Name as many animals as you can in one minute.   (no points for naming 0-4;  1 point for naming 5-9;  2 points for naming 10-14;  And 3 points for naming 15 or more).   1  7.  What were the 5 objects I asked you to remember?  (1 point for each object remembered.)   1  8.  I am going to say a series of numbers and I would like you to give them to me backwards.  For example, if I say 42, you would say 24.   87 (0 points)   0   649 (1 point)   1   8536 (1 point)   1  9.  (Draw [place])  This [place] represents a clock face.  Put the hour markers and the time at ten minutes to eleven o'clock.   (2 points for hour markers labeled correctly)   2   (2 points for correct time)   2  10.  (Show a triangle, square, and a rectangle)  Please place an  X in the triangle.  (1 point)   1  11.  Which of these objects is the largest?   (1 point)   1  12.  I am going to tell you a story.  Please listen carefully because afterward, I'm going to ask you some questions about it.   Moriah was a very successful .  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  She then met Jean, a devastatingly handsome man.  She  him and had 3 children.  They lived in [place].  She then stopped working and stayed at home to bring up her children.  When they were teenagers, she went back to work.  She and Jean lived happily ever after.   What was the female's name?  (2 points)   0   When did she go back to work?  (2 points)   0   What work did she do?  (2 points)   0   What state did she live in?  (2 points)   2    TOTAL POINTS:   18    SCORING:  High school education:  Normal 27-30;  Mild neurocognitive disorder, 21-26;  Dementia, 1-20.  Less than  high school education:  Normal, 25-30;  Mild neurocognitive disorder, 20-24;  Dementia, 1-19.        Needed Screening/Treatment:   None  Mammogram after August 30th, 2018  Needed follow up:  None    See orders.   See Patient Instructions section.   No Follow-up on file.     No indicators present

## 2023-12-08 NOTE — H&P ADULT - NSHPPHYSICALEXAM_GEN_ALL_CORE
PHYSICAL EXAM:  Vital Signs Last 24 Hrs  T(C): 36.3 (12-08-23 @ 01:26)  T(F): 97.4 (12-08-23 @ 01:26), Max: 99 (12-07-23 @ 16:34)  HR: 78 (12-08-23 @ 01:26) (76 - 85)  BP: 124/66 (12-08-23 @ 01:26)  BP(mean): --  RR: 17 (12-08-23 @ 01:26) (17 - 24)  SpO2: 99% (12-08-23 @ 01:26) (98% - 100%)  Wt(kg): --    Constitutional: NAD, awake and alert, well developed  EYES: EOMI, conjunctiva clear  ENT:  Normal Hearing, no tonsillar exudates   Neck: Soft and supple , no thyromegaly   Respiratory: mild crackles at bases, No wheezing or rhonchi, no tachypnea, no accessory muscle use  Cardiovascular: S1 and S2, regular rate and rhythm, systolic murmur, gallops or rubs, no JVD, no leg edema  Gastrointestinal: Bowel Sounds present, soft, nontender, nondistended, no guarding, no rebound  Extremities: No cyanosis or clubbing; warm to touch  Vascular: 2+ peripheral pulses lower ex  Neurological: No focal deficits, CN II-XII intact bilaterally, sensation to light touch intact in all extremities.   Musculoskeletal: 5/5 strength b/l upper and lower extremities; no joint swelling.  Skin: No rashes, no ulcerations

## 2023-12-08 NOTE — PROGRESS NOTE ADULT - SUBJECTIVE AND OBJECTIVE BOX
Patient is a 88y old  Female who presents with a chief complaint of SOB x3 weeks (08 Dec 2023 02:17)    SUBJECTIVE / OVERNIGHT EVENTS:    No CP, SOB, f/c/n/v  reports at baseline dose not use O2 at home, was a smoker 30 years ago, denies inhalers or having a pulmonologist, does not use diuretics at home, sees a cardiologist every 6m, reports she has another valve that needs replacign but "not yet"  states lives with  Reinaldo "he is worse of than me, hes 90"  states was having trouble breathing for some time but did not feel like coming in thus did not tell anyone    MEDICATIONS  (STANDING):  amLODIPine   Tablet 5 milliGRAM(s) Oral daily  aspirin enteric coated 81 milliGRAM(s) Oral daily  atorvastatin 20 milliGRAM(s) Oral at bedtime  buPROPion . 75 milliGRAM(s) Oral two times a day  enoxaparin Injectable 40 milliGRAM(s) SubCutaneous every 24 hours  furosemide   Injectable 40 milliGRAM(s) IV Push daily  gabapentin 600 milliGRAM(s) Oral two times a day  metoprolol succinate ER 25 milliGRAM(s) Oral daily  pantoprazole    Tablet 40 milliGRAM(s) Oral before breakfast  PARoxetine 10 milliGRAM(s) Oral daily    MEDICATIONS  (PRN):  acetaminophen     Tablet .. 650 milliGRAM(s) Oral every 6 hours PRN Temp greater or equal to 38C (100.4F), Mild Pain (1 - 3)  albuterol    90 MICROgram(s) HFA Inhaler 2 Puff(s) Inhalation every 6 hours PRN Shortness of Breath and/or Wheezing  melatonin 3 milliGRAM(s) Oral at bedtime PRN Insomnia  traMADol 50 milliGRAM(s) Oral daily PRN Severe Pain (7 - 10)    T(C): 36.5 (12-08-23 @ 10:54), Max: 37.2 (12-07-23 @ 16:34)  HR: 63 (12-08-23 @ 10:54) (63 - 85)  BP: 122/65 (12-08-23 @ 10:54) (116/88 - 142/82)  RR: 19 (12-08-23 @ 10:54) (17 - 24)  SpO2: 100% (12-08-23 @ 10:54) (98% - 100%)    CAPILLARY BLOOD GLUCOSE  POCT Blood Glucose.: 192 mg/dL (07 Dec 2023 15:24)    PHYSICAL EXAM:  GENERAL: NAD, thin on 3L, normal WOB, [place]   CHEST/LUNG: Clear to auscultation bilaterally; decreased   HEART: Regular rate and rhythm; No murmurs, rubs, or gallops  ABDOMEN: Soft, Nontender, Nondistended; Bowel sounds present  EXTREMITIES:   warm and well perfused, No clubbing, cyanosis, or edema  PSYCH: AAOx3   NEUROLOGY: non-focal  SKIN: No rashes or lesions    LABS:                        13.5   6.12  )-----------( 194      ( 08 Dec 2023 05:40 )             42.2     12-08    139  |  103  |  29<H>  ----------------------------<  287<H>  4.7   |  24  |  0.91    Ca    9.2      08 Dec 2023 05:40  Phos  4.2     12-08  Mg     2.00     12-08    TPro  6.5  /  Alb  3.7  /  TBili  0.9  /  DBili  x   /  AST  24  /  ALT  42<H>  /  AlkPhos  90  12-08    PT/INR - ( 07 Dec 2023 16:49 )   PT: 12.0 sec;   INR: 1.06 ratio    PTT - ( 07 Dec 2023 16:49 )  PTT:36.9 sec    Urinalysis Basic - ( 08 Dec 2023 05:40 )  Color: x / Appearance: x / SG: x / pH: x  Gluc: 287 mg/dL / Ketone: x  / Bili: x / Urobili: x   Blood: x / Protein: x / Nitrite: x   Leuk Esterase: x / RBC: x / WBC x   Sq Epi: x / Non Sq Epi: x / Bacteria: x    Microbiology:   VBG 12-07 @ 16:49  pH: 7.32/pCO2: 51/pO2: 25/HCO3: 26/lactate: 1.2    TTE 2019  Conclusions:  1. Mitral annular calcification and calcified mitral  leaflets with decreased diastolic opening. Moderate mitral  regurgitation.  2. Transcatheter aortic valve replacement. The valve is  well seated.  Mild  paravalvular aortic regurgitation.  No  aortic valvular regurgitation seen.  3. Normal left ventricular systolic function. No segmental  wall motion abnormalities.  4. Moderate diastolic dysfunction (Stage II).  5. Normal right ventricular size and function.  6. Normal pericardium with no pericardial effusion.  *** Compared with echocardiogram of 6/6/2019, no  significant changes noted.    Consultant(s) Notes Reviewed:    Care Discussed with Consultants/Other Providers:   Patient is a 88y old  Female who presents with a chief complaint of SOB x3 weeks (08 Dec 2023 02:17)    SUBJECTIVE / OVERNIGHT EVENTS:    No CP, SOB, f/c/n/v  reports at baseline dose not use O2 at home, was a smoker 30 years ago, denies inhalers or having a pulmonologist, does not use diuretics at home, sees a cardiologist every 6m, reports she has another valve that needs replacign but "not yet"  states lives with  Reinaldo "he is worse of than me, hes 90"  states was having trouble breathing for some time but did not feel like coming in thus did not tell anyone    MEDICATIONS  (STANDING):  amLODIPine   Tablet 5 milliGRAM(s) Oral daily  aspirin enteric coated 81 milliGRAM(s) Oral daily  atorvastatin 20 milliGRAM(s) Oral at bedtime  buPROPion . 75 milliGRAM(s) Oral two times a day  enoxaparin Injectable 40 milliGRAM(s) SubCutaneous every 24 hours  furosemide   Injectable 40 milliGRAM(s) IV Push daily  gabapentin 600 milliGRAM(s) Oral two times a day  metoprolol succinate ER 25 milliGRAM(s) Oral daily  pantoprazole    Tablet 40 milliGRAM(s) Oral before breakfast  PARoxetine 10 milliGRAM(s) Oral daily    MEDICATIONS  (PRN):  acetaminophen     Tablet .. 650 milliGRAM(s) Oral every 6 hours PRN Temp greater or equal to 38C (100.4F), Mild Pain (1 - 3)  albuterol    90 MICROgram(s) HFA Inhaler 2 Puff(s) Inhalation every 6 hours PRN Shortness of Breath and/or Wheezing  melatonin 3 milliGRAM(s) Oral at bedtime PRN Insomnia  traMADol 50 milliGRAM(s) Oral daily PRN Severe Pain (7 - 10)    T(C): 36.5 (12-08-23 @ 10:54), Max: 37.2 (12-07-23 @ 16:34)  HR: 63 (12-08-23 @ 10:54) (63 - 85)  BP: 122/65 (12-08-23 @ 10:54) (116/88 - 142/82)  RR: 19 (12-08-23 @ 10:54) (17 - 24)  SpO2: 100% (12-08-23 @ 10:54) (98% - 100%)    CAPILLARY BLOOD GLUCOSE  POCT Blood Glucose.: 192 mg/dL (07 Dec 2023 15:24)    PHYSICAL EXAM:  GENERAL: NAD, thin on 3L, normal WOB, [place]   CHEST/LUNG: Clear to auscultation bilaterally; decreased   HEART: Regular rate and rhythm; No murmurs, rubs, or gallops  ABDOMEN: Soft, Nontender, Nondistended; Bowel sounds present  EXTREMITIES:   warm and well perfused, No clubbing, cyanosis, or edema  PSYCH: AAOx3   NEUROLOGY: non-focal  SKIN: No rashes or lesions    LABS:                        13.5   6.12  )-----------( 194      ( 08 Dec 2023 05:40 )             42.2     12-08    139  |  103  |  29<H>  ----------------------------<  287<H>  4.7   |  24  |  0.91    Ca    9.2      08 Dec 2023 05:40  Phos  4.2     12-08  Mg     2.00     12-08    TPro  6.5  /  Alb  3.7  /  TBili  0.9  /  DBili  x   /  AST  24  /  ALT  42<H>  /  AlkPhos  90  12-08    PT/INR - ( 07 Dec 2023 16:49 )   PT: 12.0 sec;   INR: 1.06 ratio    PTT - ( 07 Dec 2023 16:49 )  PTT:36.9 sec    Urinalysis Basic - ( 08 Dec 2023 05:40 )  Color: x / Appearance: x / SG: x / pH: x  Gluc: 287 mg/dL / Ketone: x  / Bili: x / Urobili: x   Blood: x / Protein: x / Nitrite: x   Leuk Esterase: x / RBC: x / WBC x   Sq Epi: x / Non Sq Epi: x / Bacteria: x    Microbiology:   VBG 12-07 @ 16:49  pH: 7.32/pCO2: 51/pO2: 25/HCO3: 26/lactate: 1.2    TTE 2019  Conclusions:  1. Mitral annular calcification and calcified mitral  leaflets with decreased diastolic opening. Moderate mitral  regurgitation.  2. Transcatheter aortic valve replacement. The valve is  well seated.  Mild  paravalvular aortic regurgitation.  No  aortic valvular regurgitation seen.  3. Normal left ventricular systolic function. No segmental  wall motion abnormalities.  4. Moderate diastolic dysfunction (Stage II).  5. Normal right ventricular size and function.  6. Normal pericardium with no pericardial effusion.  *** Compared with echocardiogram of 6/6/2019, no  significant changes noted.    Consultant(s) Notes Reviewed:    Care Discussed with Consultants/Other Providers:

## 2023-12-08 NOTE — H&P ADULT - NSHPREVIEWOFSYSTEMS_GEN_ALL_CORE
ROS:    Constitutional: [ ] fevers [ ] chills  HEENT: [ ] postnasal drip [ ] nasal congestion  CV: [ ] chest pain [ ] orthopnea [ ] palpitations [ ] murmur  Resp: [ ] cough [x ] shortness of breath [x ] dyspnea [ ] wheezing  GI: [ ] nausea [ ] vomiting [ ] diarrhea [ ] constipation [ ] abd pain   : [ ] dysuria  [ ] increased urinary frequency  Musculoskeletal: [ ] back pain [ ] myalgias [ ] arthralgias [ ] fracture  Skin: [ ] rash [ ] itch  Neurological: [ ] headache [ ] dizziness [ ] syncope  Endocrine: [ ] diabetes [ ] thyroid problem  Hematologic/Lymphatic: [ ] anemia [ ] bleeding problem  [x ] All other systems negative

## 2023-12-08 NOTE — PHARMACOTHERAPY INTERVENTION NOTE - COMMENTS
Medication list in Outpatient Medication Review (OM[facility]) was verified with [facility].   [facility] ref# [id number]:  - Tramadol 50mg prescription filled 7/25/23 x #270 tablets (90 day supply) - 1 tab TID PRN  Medication list in Outpatient Medication Review (OMR) was verified with [facility].   [facility] ref# [id number]:  - Tramadol 50mg prescription filled 7/25/23 x #270 tablets (90 day supply) - 1 tab TID PRN

## 2023-12-08 NOTE — H&P ADULT - ASSESSMENT
88F with PMHx COPD (no home O2), HTN, TAVR 2019, GERD, hx spinal stenosis s/p fusion 2017 c/b PE (completed AC) presenting with SOB x3 weeks. Admitted with [facility]

## 2023-12-08 NOTE — H&P ADULT - PROBLEM SELECTOR PLAN 2
No wheezing on exam, no cough. Not consistent with COPD exacerbation. Not on inhalers at home  -alb PRN

## 2023-12-08 NOTE — H&P ADULT - PROBLEM/PLAN-2
Patient was educated on discharge instructions.  Patient was informed about diagnosis, symptom management, risks, and home care instructions.  Patient verbalized understanding and signed discharge instructions.  Copy of discharge instructions in chart.  Patient wheeled to lobby to go home with daughter. Daughter Shelby given discharge instructions. Patient has personal belongings and PIV removed, tip intact.      DISPLAY PLAN FREE TEXT

## 2023-12-08 NOTE — CHART NOTE - NSCHARTNOTEFT_GEN_A_CORE
Follows with Kendall Perdue MD [facility] [address], [address] [place], [place] [id number] [phone].    Called today to update on admission and attempt to make OP f/u as may be dc over weekend, advised cannot make appt for patient that are not already discharged, advised pt may be dc over weekend and thus we will be unable to call to schedule appt, asked to speak to cards office directly. Spent 13 min on phone with call center who was not able to reach the office states they were sent to .  States they will escalate to manager and call me back. Follows with Kendall Perdue MD [facility] [address], [address] [place], [place] [phone].    Called today to update on admission and attempt to make OP f/u as may be dc over weekend, advised cannot make appt for patient that are not already discharged, advised pt may be dc over weekend and thus we will be unable to call to schedule appt, asked to speak to cards office directly. Spent 13 min on phone with call center who was not able to reach the office states they were sent to .  States they will escalate to manager and call me back.

## 2023-12-08 NOTE — CHART NOTE - NSCHARTNOTEFT_GEN_A_CORE
I have personally reviewed this patient's ISTOP, reference #[id number]      A	N	O	07/24/2023	07/25/2023	tramadol hcl 50 mg tablet	270	90	Tasha Rivera	[id number]	Medicare	[facility]  A	N	O	04/10/2023	04/16/2023	tramadol hcl 50 mg tablet	270	90	Tasha Rivera	[id number]	Medicare	[facility]  A	N	O	03/16/2023	03/22/2023	tramadol hcl 50 mg tablet	21	7	Tasha Rivera	[id number]	Medicare	[facility] I have personally reviewed this patient's ISTOP, reference #[id number]      A	N	O	07/24/2023	07/25/2023	tramadol hcl 50 mg tablet	270	90	Tasha Rivera	[id number]	Medicare	[facility]  A	N	O	04/10/2023	04/16/2023	tramadol hcl 50 mg tablet	270	90	Tasha Rivera	[id number]	Medicare	[facility]  A	N	O	03/16/2023	03/22/2023	tramadol hcl 50 mg tablet	21	7	Tasha Rivera	[id number]	Medicare	[facility]

## 2023-12-09 DIAGNOSIS — I48.91 UNSPECIFIED ATRIAL FIBRILLATION: ICD-10-CM

## 2023-12-09 LAB
ANION GAP SERPL CALC-SCNC: 14 MMOL/L — SIGNIFICANT CHANGE UP (ref 7–14)
APTT BLD: 33 SEC — SIGNIFICANT CHANGE UP (ref 24.5–35.6)
APTT BLD: 33 SEC — SIGNIFICANT CHANGE UP (ref 24.5–35.6)
APTT BLD: 53.2 SEC — HIGH (ref 24.5–35.6)
APTT BLD: 53.2 SEC — HIGH (ref 24.5–35.6)
APTT BLD: 96.5 SEC — HIGH (ref 24.5–35.6)
APTT BLD: 96.5 SEC — HIGH (ref 24.5–35.6)
BUN SERPL-MCNC: 46 MG/DL — HIGH (ref 7–23)
CALCIUM SERPL-MCNC: 8.8 MG/DL — SIGNIFICANT CHANGE UP (ref 8.4–10.5)
CALCIUM SERPL-MCNC: 8.8 MG/DL — SIGNIFICANT CHANGE UP (ref 8.4–10.5)
CALCIUM SERPL-MCNC: 9.3 MG/DL — SIGNIFICANT CHANGE UP (ref 8.4–10.5)
CALCIUM SERPL-MCNC: 9.3 MG/DL — SIGNIFICANT CHANGE UP (ref 8.4–10.5)
CHLORIDE SERPL-SCNC: 101 MMOL/L — SIGNIFICANT CHANGE UP (ref 98–107)
CO2 SERPL-SCNC: 23 MMOL/L — SIGNIFICANT CHANGE UP (ref 22–31)
CO2 SERPL-SCNC: 23 MMOL/L — SIGNIFICANT CHANGE UP (ref 22–31)
CO2 SERPL-SCNC: 24 MMOL/L — SIGNIFICANT CHANGE UP (ref 22–31)
CO2 SERPL-SCNC: 24 MMOL/L — SIGNIFICANT CHANGE UP (ref 22–31)
CREAT SERPL-MCNC: 1.08 MG/DL — SIGNIFICANT CHANGE UP (ref 0.5–1.3)
CREAT SERPL-MCNC: 1.08 MG/DL — SIGNIFICANT CHANGE UP (ref 0.5–1.3)
CREAT SERPL-MCNC: 1.11 MG/DL — SIGNIFICANT CHANGE UP (ref 0.5–1.3)
CREAT SERPL-MCNC: 1.11 MG/DL — SIGNIFICANT CHANGE UP (ref 0.5–1.3)
EGFR: 48 ML/MIN/1.73M2 — LOW
EGFR: 48 ML/MIN/1.73M2 — LOW
EGFR: 49 ML/MIN/1.73M2 — LOW
EGFR: 49 ML/MIN/1.73M2 — LOW
GLUCOSE BLDC GLUCOMTR-MCNC: 119 MG/DL — HIGH (ref 70–99)
GLUCOSE BLDC GLUCOMTR-MCNC: 119 MG/DL — HIGH (ref 70–99)
GLUCOSE BLDC GLUCOMTR-MCNC: 254 MG/DL — HIGH (ref 70–99)
GLUCOSE BLDC GLUCOMTR-MCNC: 254 MG/DL — HIGH (ref 70–99)
GLUCOSE BLDC GLUCOMTR-MCNC: 67 MG/DL — LOW (ref 70–99)
GLUCOSE BLDC GLUCOMTR-MCNC: 67 MG/DL — LOW (ref 70–99)
GLUCOSE BLDC GLUCOMTR-MCNC: 70 MG/DL — SIGNIFICANT CHANGE UP (ref 70–99)
GLUCOSE BLDC GLUCOMTR-MCNC: 70 MG/DL — SIGNIFICANT CHANGE UP (ref 70–99)
GLUCOSE SERPL-MCNC: 215 MG/DL — HIGH (ref 70–99)
GLUCOSE SERPL-MCNC: 215 MG/DL — HIGH (ref 70–99)
GLUCOSE SERPL-MCNC: 240 MG/DL — HIGH (ref 70–99)
GLUCOSE SERPL-MCNC: 240 MG/DL — HIGH (ref 70–99)
HCT VFR BLD CALC: 43.2 % — SIGNIFICANT CHANGE UP (ref 34.5–45)
HCT VFR BLD CALC: 43.2 % — SIGNIFICANT CHANGE UP (ref 34.5–45)
HCT VFR BLD CALC: 45.5 % — HIGH (ref 34.5–45)
HCT VFR BLD CALC: 45.5 % — HIGH (ref 34.5–45)
HGB BLD-MCNC: 14 G/DL — SIGNIFICANT CHANGE UP (ref 11.5–15.5)
HGB BLD-MCNC: 14 G/DL — SIGNIFICANT CHANGE UP (ref 11.5–15.5)
HGB BLD-MCNC: 14.8 G/DL — SIGNIFICANT CHANGE UP (ref 11.5–15.5)
HGB BLD-MCNC: 14.8 G/DL — SIGNIFICANT CHANGE UP (ref 11.5–15.5)
MAGNESIUM SERPL-MCNC: 2.3 MG/DL — SIGNIFICANT CHANGE UP (ref 1.6–2.6)
MCHC RBC-ENTMCNC: 29.7 PG — SIGNIFICANT CHANGE UP (ref 27–34)
MCHC RBC-ENTMCNC: 29.7 PG — SIGNIFICANT CHANGE UP (ref 27–34)
MCHC RBC-ENTMCNC: 29.8 PG — SIGNIFICANT CHANGE UP (ref 27–34)
MCHC RBC-ENTMCNC: 29.8 PG — SIGNIFICANT CHANGE UP (ref 27–34)
MCHC RBC-ENTMCNC: 32.4 GM/DL — SIGNIFICANT CHANGE UP (ref 32–36)
MCHC RBC-ENTMCNC: 32.4 GM/DL — SIGNIFICANT CHANGE UP (ref 32–36)
MCHC RBC-ENTMCNC: 32.5 GM/DL — SIGNIFICANT CHANGE UP (ref 32–36)
MCHC RBC-ENTMCNC: 32.5 GM/DL — SIGNIFICANT CHANGE UP (ref 32–36)
MCV RBC AUTO: 91.5 FL — SIGNIFICANT CHANGE UP (ref 80–100)
MCV RBC AUTO: 91.5 FL — SIGNIFICANT CHANGE UP (ref 80–100)
MCV RBC AUTO: 91.7 FL — SIGNIFICANT CHANGE UP (ref 80–100)
MCV RBC AUTO: 91.7 FL — SIGNIFICANT CHANGE UP (ref 80–100)
NRBC # BLD: 0 /100 WBCS — SIGNIFICANT CHANGE UP (ref 0–0)
NRBC # FLD: 0 K/UL — SIGNIFICANT CHANGE UP (ref 0–0)
NT-PROBNP SERPL-SCNC: 2722 PG/ML — HIGH
NT-PROBNP SERPL-SCNC: 2722 PG/ML — HIGH
PHOSPHATE SERPL-MCNC: 3.1 MG/DL — SIGNIFICANT CHANGE UP (ref 2.5–4.5)
PLATELET # BLD AUTO: 229 K/UL — SIGNIFICANT CHANGE UP (ref 150–400)
PLATELET # BLD AUTO: 229 K/UL — SIGNIFICANT CHANGE UP (ref 150–400)
PLATELET # BLD AUTO: 245 K/UL — SIGNIFICANT CHANGE UP (ref 150–400)
PLATELET # BLD AUTO: 245 K/UL — SIGNIFICANT CHANGE UP (ref 150–400)
POTASSIUM SERPL-MCNC: 4.1 MMOL/L — SIGNIFICANT CHANGE UP (ref 3.5–5.3)
POTASSIUM SERPL-MCNC: 4.1 MMOL/L — SIGNIFICANT CHANGE UP (ref 3.5–5.3)
POTASSIUM SERPL-MCNC: 4.5 MMOL/L — SIGNIFICANT CHANGE UP (ref 3.5–5.3)
POTASSIUM SERPL-MCNC: 4.5 MMOL/L — SIGNIFICANT CHANGE UP (ref 3.5–5.3)
POTASSIUM SERPL-SCNC: 4.1 MMOL/L — SIGNIFICANT CHANGE UP (ref 3.5–5.3)
POTASSIUM SERPL-SCNC: 4.1 MMOL/L — SIGNIFICANT CHANGE UP (ref 3.5–5.3)
POTASSIUM SERPL-SCNC: 4.5 MMOL/L — SIGNIFICANT CHANGE UP (ref 3.5–5.3)
POTASSIUM SERPL-SCNC: 4.5 MMOL/L — SIGNIFICANT CHANGE UP (ref 3.5–5.3)
RBC # BLD: 4.72 M/UL — SIGNIFICANT CHANGE UP (ref 3.8–5.2)
RBC # BLD: 4.72 M/UL — SIGNIFICANT CHANGE UP (ref 3.8–5.2)
RBC # BLD: 4.96 M/UL — SIGNIFICANT CHANGE UP (ref 3.8–5.2)
RBC # BLD: 4.96 M/UL — SIGNIFICANT CHANGE UP (ref 3.8–5.2)
RBC # FLD: 13.2 % — SIGNIFICANT CHANGE UP (ref 10.3–14.5)
RBC # FLD: 13.2 % — SIGNIFICANT CHANGE UP (ref 10.3–14.5)
RBC # FLD: 13.3 % — SIGNIFICANT CHANGE UP (ref 10.3–14.5)
RBC # FLD: 13.3 % — SIGNIFICANT CHANGE UP (ref 10.3–14.5)
SODIUM SERPL-SCNC: 138 MMOL/L — SIGNIFICANT CHANGE UP (ref 135–145)
SODIUM SERPL-SCNC: 138 MMOL/L — SIGNIFICANT CHANGE UP (ref 135–145)
SODIUM SERPL-SCNC: 139 MMOL/L — SIGNIFICANT CHANGE UP (ref 135–145)
SODIUM SERPL-SCNC: 139 MMOL/L — SIGNIFICANT CHANGE UP (ref 135–145)
TSH SERPL-MCNC: 2.19 UIU/ML — SIGNIFICANT CHANGE UP (ref 0.27–4.2)
TSH SERPL-MCNC: 2.19 UIU/ML — SIGNIFICANT CHANGE UP (ref 0.27–4.2)
WBC # BLD: 11.9 K/UL — HIGH (ref 3.8–10.5)
WBC # BLD: 11.9 K/UL — HIGH (ref 3.8–10.5)
WBC # BLD: 14.07 K/UL — HIGH (ref 3.8–10.5)
WBC # BLD: 14.07 K/UL — HIGH (ref 3.8–10.5)
WBC # FLD AUTO: 11.9 K/UL — HIGH (ref 3.8–10.5)
WBC # FLD AUTO: 11.9 K/UL — HIGH (ref 3.8–10.5)
WBC # FLD AUTO: 14.07 K/UL — HIGH (ref 3.8–10.5)
WBC # FLD AUTO: 14.07 K/UL — HIGH (ref 3.8–10.5)

## 2023-12-09 PROCEDURE — 99223 1ST HOSP IP/OBS HIGH 75: CPT

## 2023-12-09 PROCEDURE — 99233 SBSQ HOSP IP/OBS HIGH 50: CPT

## 2023-12-09 RX ORDER — HEPARIN SODIUM 5000 [USP'U]/ML
4000 INJECTION INTRAVENOUS; SUBCUTANEOUS EVERY 6 HOURS
Refills: 0 | Status: DISCONTINUED | OUTPATIENT
Start: 2023-12-09 | End: 2023-12-10

## 2023-12-09 RX ORDER — SODIUM CHLORIDE 9 MG/ML
250 INJECTION INTRAMUSCULAR; INTRAVENOUS; SUBCUTANEOUS ONCE
Refills: 0 | Status: COMPLETED | OUTPATIENT
Start: 2023-12-09 | End: 2023-12-09

## 2023-12-09 RX ORDER — HEPARIN SODIUM 5000 [USP'U]/ML
INJECTION INTRAVENOUS; SUBCUTANEOUS
Qty: 25000 | Refills: 0 | Status: DISCONTINUED | OUTPATIENT
Start: 2023-12-09 | End: 2023-12-10

## 2023-12-09 RX ORDER — SODIUM CHLORIDE 9 MG/ML
1000 INJECTION, SOLUTION INTRAVENOUS
Refills: 0 | Status: DISCONTINUED | OUTPATIENT
Start: 2023-12-09 | End: 2023-12-11

## 2023-12-09 RX ORDER — METOPROLOL TARTRATE 50 MG
25 TABLET ORAL ONCE
Refills: 0 | Status: COMPLETED | OUTPATIENT
Start: 2023-12-09 | End: 2023-12-09

## 2023-12-09 RX ORDER — INSULIN LISPRO 100/ML
VIAL (ML) SUBCUTANEOUS
Refills: 0 | Status: DISCONTINUED | OUTPATIENT
Start: 2023-12-09 | End: 2023-12-11

## 2023-12-09 RX ORDER — DEXTROSE 50 % IN WATER 50 %
12.5 SYRINGE (ML) INTRAVENOUS ONCE
Refills: 0 | Status: DISCONTINUED | OUTPATIENT
Start: 2023-12-09 | End: 2023-12-11

## 2023-12-09 RX ORDER — DEXTROSE 50 % IN WATER 50 %
15 SYRINGE (ML) INTRAVENOUS ONCE
Refills: 0 | Status: DISCONTINUED | OUTPATIENT
Start: 2023-12-09 | End: 2023-12-11

## 2023-12-09 RX ORDER — HEPARIN SODIUM 5000 [USP'U]/ML
2000 INJECTION INTRAVENOUS; SUBCUTANEOUS EVERY 6 HOURS
Refills: 0 | Status: DISCONTINUED | OUTPATIENT
Start: 2023-12-09 | End: 2023-12-10

## 2023-12-09 RX ORDER — METOPROLOL TARTRATE 50 MG
5 TABLET ORAL ONCE
Refills: 0 | Status: COMPLETED | OUTPATIENT
Start: 2023-12-09 | End: 2023-12-09

## 2023-12-09 RX ORDER — DEXTROSE 50 % IN WATER 50 %
25 SYRINGE (ML) INTRAVENOUS ONCE
Refills: 0 | Status: DISCONTINUED | OUTPATIENT
Start: 2023-12-09 | End: 2023-12-11

## 2023-12-09 RX ORDER — METOPROLOL TARTRATE 50 MG
25 TABLET ORAL
Refills: 0 | Status: DISCONTINUED | OUTPATIENT
Start: 2023-12-09 | End: 2023-12-09

## 2023-12-09 RX ORDER — DILTIAZEM HCL 120 MG
10 CAPSULE, EXT RELEASE 24 HR ORAL ONCE
Refills: 0 | Status: COMPLETED | OUTPATIENT
Start: 2023-12-09 | End: 2023-12-09

## 2023-12-09 RX ORDER — INSULIN GLARGINE 100 [IU]/ML
10 INJECTION, SOLUTION SUBCUTANEOUS AT BEDTIME
Refills: 0 | Status: DISCONTINUED | OUTPATIENT
Start: 2023-12-09 | End: 2023-12-11

## 2023-12-09 RX ORDER — GLUCAGON INJECTION, SOLUTION 0.5 MG/.1ML
1 INJECTION, SOLUTION SUBCUTANEOUS ONCE
Refills: 0 | Status: DISCONTINUED | OUTPATIENT
Start: 2023-12-09 | End: 2023-12-11

## 2023-12-09 RX ORDER — DIGOXIN 250 MCG
250 TABLET ORAL EVERY 6 HOURS
Refills: 0 | Status: COMPLETED | OUTPATIENT
Start: 2023-12-09 | End: 2023-12-10

## 2023-12-09 RX ORDER — METOPROLOL TARTRATE 50 MG
50 TABLET ORAL EVERY 12 HOURS
Refills: 0 | Status: DISCONTINUED | OUTPATIENT
Start: 2023-12-09 | End: 2023-12-11

## 2023-12-09 RX ORDER — DIGOXIN 250 MCG
500 TABLET ORAL ONCE
Refills: 0 | Status: COMPLETED | OUTPATIENT
Start: 2023-12-09 | End: 2023-12-09

## 2023-12-09 RX ORDER — MIDODRINE HYDROCHLORIDE 2.5 MG/1
10 TABLET ORAL THREE TIMES A DAY
Refills: 0 | Status: DISCONTINUED | OUTPATIENT
Start: 2023-12-09 | End: 2023-12-09

## 2023-12-09 RX ORDER — GABAPENTIN 400 MG/1
400 CAPSULE ORAL
Refills: 0 | Status: DISCONTINUED | OUTPATIENT
Start: 2023-12-10 | End: 2023-12-11

## 2023-12-09 RX ADMIN — Medication 500 MICROGRAM(S): at 11:20

## 2023-12-09 RX ADMIN — HEPARIN SODIUM 1000 UNIT(S)/HR: 5000 INJECTION INTRAVENOUS; SUBCUTANEOUS at 18:49

## 2023-12-09 RX ADMIN — Medication 5 MILLIGRAM(S): at 00:16

## 2023-12-09 RX ADMIN — BUPROPION HYDROCHLORIDE 75 MILLIGRAM(S): 150 TABLET, EXTENDED RELEASE ORAL at 06:16

## 2023-12-09 RX ADMIN — SODIUM CHLORIDE 500 MILLILITER(S): 9 INJECTION INTRAMUSCULAR; INTRAVENOUS; SUBCUTANEOUS at 04:46

## 2023-12-09 RX ADMIN — HEPARIN SODIUM 1000 UNIT(S)/HR: 5000 INJECTION INTRAVENOUS; SUBCUTANEOUS at 19:40

## 2023-12-09 RX ADMIN — Medication 10 MILLIGRAM(S): at 11:03

## 2023-12-09 RX ADMIN — PANTOPRAZOLE SODIUM 40 MILLIGRAM(S): 20 TABLET, DELAYED RELEASE ORAL at 06:16

## 2023-12-09 RX ADMIN — GABAPENTIN 600 MILLIGRAM(S): 400 CAPSULE ORAL at 06:16

## 2023-12-09 RX ADMIN — Medication 250 MICROGRAM(S): at 17:56

## 2023-12-09 RX ADMIN — HEPARIN SODIUM 900 UNIT(S)/HR: 5000 INJECTION INTRAVENOUS; SUBCUTANEOUS at 02:32

## 2023-12-09 RX ADMIN — Medication 25 MILLIGRAM(S): at 06:39

## 2023-12-09 RX ADMIN — HEPARIN SODIUM 1000 UNIT(S)/HR: 5000 INJECTION INTRAVENOUS; SUBCUTANEOUS at 20:32

## 2023-12-09 RX ADMIN — Medication 81 MILLIGRAM(S): at 11:03

## 2023-12-09 RX ADMIN — Medication 50 MILLIGRAM(S): at 17:55

## 2023-12-09 RX ADMIN — ATORVASTATIN CALCIUM 20 MILLIGRAM(S): 80 TABLET, FILM COATED ORAL at 21:56

## 2023-12-09 RX ADMIN — INSULIN GLARGINE 10 UNIT(S): 100 INJECTION, SOLUTION SUBCUTANEOUS at 23:15

## 2023-12-09 RX ADMIN — HEPARIN SODIUM 1000 UNIT(S)/HR: 5000 INJECTION INTRAVENOUS; SUBCUTANEOUS at 19:29

## 2023-12-09 RX ADMIN — HEPARIN SODIUM 1000 UNIT(S)/HR: 5000 INJECTION INTRAVENOUS; SUBCUTANEOUS at 11:16

## 2023-12-09 RX ADMIN — Medication 5 MILLIGRAM(S): at 01:03

## 2023-12-09 RX ADMIN — Medication 3: at 18:23

## 2023-12-09 RX ADMIN — BUPROPION HYDROCHLORIDE 75 MILLIGRAM(S): 150 TABLET, EXTENDED RELEASE ORAL at 17:56

## 2023-12-09 RX ADMIN — Medication 10 MILLIGRAM(S): at 02:08

## 2023-12-09 RX ADMIN — HEPARIN SODIUM 900 UNIT(S)/HR: 5000 INJECTION INTRAVENOUS; SUBCUTANEOUS at 07:15

## 2023-12-09 RX ADMIN — Medication 5 MILLIGRAM(S): at 11:03

## 2023-12-09 RX ADMIN — Medication 25 MILLIGRAM(S): at 09:28

## 2023-12-09 NOTE — PROVIDER CONTACT NOTE (OTHER) - ACTION/TREATMENT ORDERED:
ACP notified  another stat dose of lopressor given
ACP notified  another cardizem given as per orders
ACP notified  stat lopressor ordered and given  stat EKG done  stat labs sent

## 2023-12-09 NOTE — CHART NOTE - NSCHARTNOTEFT_GEN_A_CORE
Medicine Subsequent Hospital Care Note- ACP  CC: tachycardia   HPI/Subjective: Notified by RN that patient tachycardic to 140s on tele monitor. Tele strip reviewed, patient's rhythm changed to afib with RVR heart rate ranging 140- 160s around 2340. Patient seen and assessed at bedside. Patient denies chest pain, palpitation, dizziness, shortness of breath and dizziness.     T(C):   T(F):   HR:   BP:   RR:   SpO2:     Telemetry/Alarms: Afib with RVR   General: No distress   Neurology: Awake and oriented x3 , follow commands   Respiratory: Clear on auscultation  CV:  S1 S2 irregular   Abdominal: Soft, NT, ND +BS,   Extremities: No edema, + peripheral pulses  Psych: Oriented x 3, normal affect  Incisions:   Tubes:  Relevant labs, radiology and Medications reviewed                        13.5   6.12  )-----------( 194      ( 08 Dec 2023 05:40 )             42.2     12-08    139  |  103  |  29<H>  ----------------------------<  287<H>  4.7   |  24  |  0.91    Ca    9.2      08 Dec 2023 05:40  Phos  4.2     12-08  Mg     2.00     12-08    TPro  6.5  /  Alb  3.7  /  TBili  0.9  /  DBili  x   /  AST  24  /  ALT  42<H>  /  AlkPhos  90  12-08    PT/INR - ( 07 Dec 2023 16:49 )   PT: 12.0 sec;   INR: 1.06 ratio         PTT - ( 07 Dec 2023 16:49 )  PTT:36.9 sec  MEDICATIONS  (STANDING):  amLODIPine   Tablet 5 milliGRAM(s) Oral daily  aspirin enteric coated 81 milliGRAM(s) Oral daily  atorvastatin 20 milliGRAM(s) Oral at bedtime  buPROPion . 75 milliGRAM(s) Oral two times a day  furosemide   Injectable 40 milliGRAM(s) IV Push two times a day  gabapentin 600 milliGRAM(s) Oral two times a day  heparin   Injectable 5000 Unit(s) SubCutaneous every 12 hours  influenza  Vaccine (HIGH DOSE) 0.7 milliLiter(s) IntraMuscular once  metoprolol succinate ER 25 milliGRAM(s) Oral daily  metoprolol tartrate Injectable 5 milliGRAM(s) IV Push once  pantoprazole    Tablet 40 milliGRAM(s) Oral before breakfast  PARoxetine 10 milliGRAM(s) Oral daily    MEDICATIONS  (PRN):  acetaminophen     Tablet .. 650 milliGRAM(s) Oral every 6 hours PRN Temp greater or equal to 38C (100.4F), Mild Pain (1 - 3)  albuterol    90 MICROgram(s) HFA Inhaler 2 Puff(s) Inhalation every 6 hours PRN Shortness of Breath and/or Wheezing  melatonin 3 milliGRAM(s) Oral at bedtime PRN Insomnia  traMADol 50 milliGRAM(s) Oral daily PRN Severe Pain (7 - 10)    88F with PMHx COPD (no home O2), HTN, TAVR 2019, GERD, hx spinal stenosis s/p fusion 2017 c/b PE (completed AC) presenting with SOB x3 weeks. Admitted with ADHF, Now with new onset Afib with RVR     New onset Afib with RVR:   EKG : Afib with RVR heart rate at 155 bmp  Patient is on Toprol Xl 25 mg po daily ( last dose on 12/8/23 at 0500)   Lopressor 5 mg IV x1   Stat BMP, TSH, PTT  Chadsvasc score -5   Will start heparin drip pending PTT result , risks and benefits explainer to patient who  verbalized understanding  Case discussed with [facility] Stan Call. Medicine Subsequent Hospital Care Note- ACP  CC: tachycardia   HPI/Subjective: Notified by RN that patient tachycardic to 140s on tele monitor. Tele strip reviewed, patient's rhythm changed to afib with RVR heart rate ranging 140- 160s around 2340. Patient seen and assessed at bedside. Patient denies chest pain, palpitation, dizziness, shortness of breath and dizziness.     T(C):   T(F):   HR:   BP:   RR:   SpO2:     Telemetry/Alarms: Afib with RVR   General: No distress   Neurology: Awake and oriented x3 , follow commands   Respiratory: Clear on auscultation  CV:  S1 S2 irregular   Abdominal: Soft, NT, ND +BS,   Extremities: No edema, + peripheral pulses  Psych: Oriented x 3, normal affect  Incisions:   Tubes:  Relevant labs, radiology and Medications reviewed                        13.5   6.12  )-----------( 194      ( 08 Dec 2023 05:40 )             42.2     12-08    139  |  103  |  29<H>  ----------------------------<  287<H>  4.7   |  24  |  0.91    Ca    9.2      08 Dec 2023 05:40  Phos  4.2     12-08  Mg     2.00     12-08    TPro  6.5  /  Alb  3.7  /  TBili  0.9  /  DBili  x   /  AST  24  /  ALT  42<H>  /  AlkPhos  90  12-08    PT/INR - ( 07 Dec 2023 16:49 )   PT: 12.0 sec;   INR: 1.06 ratio         PTT - ( 07 Dec 2023 16:49 )  PTT:36.9 sec  MEDICATIONS  (STANDING):  amLODIPine   Tablet 5 milliGRAM(s) Oral daily  aspirin enteric coated 81 milliGRAM(s) Oral daily  atorvastatin 20 milliGRAM(s) Oral at bedtime  buPROPion . 75 milliGRAM(s) Oral two times a day  furosemide   Injectable 40 milliGRAM(s) IV Push two times a day  gabapentin 600 milliGRAM(s) Oral two times a day  heparin   Injectable 5000 Unit(s) SubCutaneous every 12 hours  influenza  Vaccine (HIGH DOSE) 0.7 milliLiter(s) IntraMuscular once  metoprolol succinate ER 25 milliGRAM(s) Oral daily  metoprolol tartrate Injectable 5 milliGRAM(s) IV Push once  pantoprazole    Tablet 40 milliGRAM(s) Oral before breakfast  PARoxetine 10 milliGRAM(s) Oral daily    MEDICATIONS  (PRN):  acetaminophen     Tablet .. 650 milliGRAM(s) Oral every 6 hours PRN Temp greater or equal to 38C (100.4F), Mild Pain (1 - 3)  albuterol    90 MICROgram(s) HFA Inhaler 2 Puff(s) Inhalation every 6 hours PRN Shortness of Breath and/or Wheezing  melatonin 3 milliGRAM(s) Oral at bedtime PRN Insomnia  traMADol 50 milliGRAM(s) Oral daily PRN Severe Pain (7 - 10)    88F with PMHx COPD (no home O2), HTN, TAVR 2019, GERD, hx spinal stenosis s/p fusion 2017 c/b PE (completed AC) presenting with SOB x3 weeks. Admitted with ADHF, Now with new onset Afib with RVR     New onset Afib with RVR:   EKG : Afib with RVR heart rate at 155 bmp  Patient is on Toprol Xl 25 mg po daily ( last dose on 12/8/23 at 0500)   Lopressor 5 mg IV x1   Stat BMP, TSH, PTT  Chadsvasc score -5   Will start heparin drip pending PTT result , risks and benefits explainer to patient who  verbalized understanding  Case discussed with [facility] Stan Call. Medicine Subsequent Hospital Care Note- ACP  CC: tachycardia   HPI/Subjective: Notified by RN that patient tachycardic to 140s on tele monitor. Tele strip reviewed, patient's rhythm changed to afib with RVR heart rate ranging 140- 160s around 2340. Patient seen and assessed at bedside. Patient denies chest pain, palpitation, dizziness, and  shortness of breath.     T(F): 98.2  HR: 139  BP: 123/81  RR: 18  SpO2: 95%     Telemetry/Alarms: Afib with RVR   General: No distress   Neurology: Awake and oriented x3 , follow commands   Respiratory: Clear on auscultation  CV:  S1 S2 irregular   Abdominal: Soft, NT, ND +BS,   Extremities: No edema, + peripheral pulses  Psych: Oriented x 3, normal affect  Incisions:   Tubes:  Relevant labs, radiology and Medications reviewed                        13.5   6.12  )-----------( 194      ( 08 Dec 2023 05:40 )             42.2     12-08    139  |  103  |  29<H>  ----------------------------<  287<H>  4.7   |  24  |  0.91    Ca    9.2      08 Dec 2023 05:40  Phos  4.2     12-08  Mg     2.00     12-08    TPro  6.5  /  Alb  3.7  /  TBili  0.9  /  DBili  x   /  AST  24  /  ALT  42<H>  /  AlkPhos  90  12-08    PT/INR - ( 07 Dec 2023 16:49 )   PT: 12.0 sec;   INR: 1.06 ratio         PTT - ( 07 Dec 2023 16:49 )  PTT:36.9 sec  MEDICATIONS  (STANDING):  amLODIPine   Tablet 5 milliGRAM(s) Oral daily  aspirin enteric coated 81 milliGRAM(s) Oral daily  atorvastatin 20 milliGRAM(s) Oral at bedtime  buPROPion . 75 milliGRAM(s) Oral two times a day  furosemide   Injectable 40 milliGRAM(s) IV Push two times a day  gabapentin 600 milliGRAM(s) Oral two times a day  heparin   Injectable 5000 Unit(s) SubCutaneous every 12 hours  influenza  Vaccine (HIGH DOSE) 0.7 milliLiter(s) IntraMuscular once  metoprolol succinate ER 25 milliGRAM(s) Oral daily  metoprolol tartrate Injectable 5 milliGRAM(s) IV Push once  pantoprazole    Tablet 40 milliGRAM(s) Oral before breakfast  PARoxetine 10 milliGRAM(s) Oral daily    MEDICATIONS  (PRN):  acetaminophen     Tablet .. 650 milliGRAM(s) Oral every 6 hours PRN Temp greater or equal to 38C (100.4F), Mild Pain (1 - 3)  albuterol    90 MICROgram(s) HFA Inhaler 2 Puff(s) Inhalation every 6 hours PRN Shortness of Breath and/or Wheezing  melatonin 3 milliGRAM(s) Oral at bedtime PRN Insomnia  traMADol 50 milliGRAM(s) Oral daily PRN Severe Pain (7 - 10)    88F with PMHx COPD (no home O2), HTN, TAVR 2019, GERD, hx spinal stenosis s/p fusion 2017 c/b PE (completed AC) presenting with SOB x3 weeks. Admitted with ADHF, Now with new onset Afib with RVR     New onset Afib with RVR:   EKG : Afib with RVR heart rate at 155 bmp  Patient is on Toprol Xl 25 mg po daily ( last dose on 12/8/23 at 0500)   Lopressor 5 mg IV x1   Stat BMP, TSH, PTT  Chadsvasc score -5   Will start heparin drip pending PTT result , risks and benefits explainer to patient who  verbalized understanding  Case discussed with [facility] Dr: Oneyda. Medicine Subsequent Hospital Care Note- ACP  CC: tachycardia   HPI/Subjective: Notified by RN that patient tachycardic to 140s on tele monitor. Tele strip reviewed, patient's rhythm changed to afib with RVR heart rate ranging 140- 160s around 2340. Patient seen and assessed at bedside. Patient denies chest pain, palpitation, dizziness, and  shortness of breath.     T(F): 98.2  HR: 139  BP: 123/81  RR: 18  SpO2: 95%     Telemetry/Alarms: Afib with RVR   General: No distress   Neurology: Awake and oriented x3 , follow commands   Respiratory: Clear on auscultation  CV:  S1 S2 irregular   Abdominal: Soft, NT, ND +BS,   Extremities: No edema, + peripheral pulses  Psych: Oriented x 3, normal affect  Incisions:   Tubes:  Relevant labs, radiology and Medications reviewed                        13.5   6.12  )-----------( 194      ( 08 Dec 2023 05:40 )             42.2     12-08    139  |  103  |  29<H>  ----------------------------<  287<H>  4.7   |  24  |  0.91    Ca    9.2      08 Dec 2023 05:40  Phos  4.2     12-08  Mg     2.00     12-08    TPro  6.5  /  Alb  3.7  /  TBili  0.9  /  DBili  x   /  AST  24  /  ALT  42<H>  /  AlkPhos  90  12-08    PT/INR - ( 07 Dec 2023 16:49 )   PT: 12.0 sec;   INR: 1.06 ratio         PTT - ( 07 Dec 2023 16:49 )  PTT:36.9 sec  MEDICATIONS  (STANDING):  amLODIPine   Tablet 5 milliGRAM(s) Oral daily  aspirin enteric coated 81 milliGRAM(s) Oral daily  atorvastatin 20 milliGRAM(s) Oral at bedtime  buPROPion . 75 milliGRAM(s) Oral two times a day  furosemide   Injectable 40 milliGRAM(s) IV Push two times a day  gabapentin 600 milliGRAM(s) Oral two times a day  heparin   Injectable 5000 Unit(s) SubCutaneous every 12 hours  influenza  Vaccine (HIGH DOSE) 0.7 milliLiter(s) IntraMuscular once  metoprolol succinate ER 25 milliGRAM(s) Oral daily  metoprolol tartrate Injectable 5 milliGRAM(s) IV Push once  pantoprazole    Tablet 40 milliGRAM(s) Oral before breakfast  PARoxetine 10 milliGRAM(s) Oral daily    MEDICATIONS  (PRN):  acetaminophen     Tablet .. 650 milliGRAM(s) Oral every 6 hours PRN Temp greater or equal to 38C (100.4F), Mild Pain (1 - 3)  albuterol    90 MICROgram(s) HFA Inhaler 2 Puff(s) Inhalation every 6 hours PRN Shortness of Breath and/or Wheezing  melatonin 3 milliGRAM(s) Oral at bedtime PRN Insomnia  traMADol 50 milliGRAM(s) Oral daily PRN Severe Pain (7 - 10)    88F with PMHx COPD (no home O2), HTN, TAVR 2019, GERD, hx spinal stenosis s/p fusion 2017 c/b PE (completed AC) presenting with SOB x3 weeks. Admitted with ADHF, Now with new onset Afib with RVR     New onset Afib with RVR:   EKG : Afib with RVR heart rate at 155 bmp  Patient is on Toprol Xl 25 mg po daily ( last dose on 12/8/23 at 0500)   Lopressor 5 mg IV x1   Stat BMP, TSH, PTT  Chadsvasc score -5   Will start heparin drip pending PTT result , risks and benefits explainer to patient who  verbalized understanding  Case discussed with [facility] Dr: Oneyda. Medicine Subsequent Hospital Care Note- ACP  CC: tachycardia   HPI/Subjective: Notified by RN that patient tachycardic to 140s on tele monitor. Tele strip reviewed, patient's rhythm changed to afib with RVR heart rate ranging 140- 160s around 2340. Patient seen and assessed at bedside. Patient denies chest pain, palpitation, dizziness, and  shortness of breath.     T(F): 98.2  HR: 139  BP: 123/81  RR: 18  SpO2: 95%     Telemetry/Alarms: Afib with RVR   General: No distress   Neurology: Awake and oriented x3 , follow commands   Respiratory: Clear on auscultation  CV:  S1 S2 irregular   Abdominal: Soft, NT, ND +BS,   Extremities: No edema, + peripheral pulses  Psych: Oriented x 3, normal affect  Incisions:   Tubes:  Relevant labs, radiology and Medications reviewed                        13.5   6.12  )-----------( 194      ( 08 Dec 2023 05:40 )             42.2     12-08    139  |  103  |  29<H>  ----------------------------<  287<H>  4.7   |  24  |  0.91    Ca    9.2      08 Dec 2023 05:40  Phos  4.2     12-08  Mg     2.00     12-08    TPro  6.5  /  Alb  3.7  /  TBili  0.9  /  DBili  x   /  AST  24  /  ALT  42<H>  /  AlkPhos  90  12-08    PT/INR - ( 07 Dec 2023 16:49 )   PT: 12.0 sec;   INR: 1.06 ratio         PTT - ( 07 Dec 2023 16:49 )  PTT:36.9 sec  MEDICATIONS  (STANDING):  amLODIPine   Tablet 5 milliGRAM(s) Oral daily  aspirin enteric coated 81 milliGRAM(s) Oral daily  atorvastatin 20 milliGRAM(s) Oral at bedtime  buPROPion . 75 milliGRAM(s) Oral two times a day  furosemide   Injectable 40 milliGRAM(s) IV Push two times a day  gabapentin 600 milliGRAM(s) Oral two times a day  heparin   Injectable 5000 Unit(s) SubCutaneous every 12 hours  influenza  Vaccine (HIGH DOSE) 0.7 milliLiter(s) IntraMuscular once  metoprolol succinate ER 25 milliGRAM(s) Oral daily  metoprolol tartrate Injectable 5 milliGRAM(s) IV Push once  pantoprazole    Tablet 40 milliGRAM(s) Oral before breakfast  PARoxetine 10 milliGRAM(s) Oral daily    MEDICATIONS  (PRN):  acetaminophen     Tablet .. 650 milliGRAM(s) Oral every 6 hours PRN Temp greater or equal to 38C (100.4F), Mild Pain (1 - 3)  albuterol    90 MICROgram(s) HFA Inhaler 2 Puff(s) Inhalation every 6 hours PRN Shortness of Breath and/or Wheezing  melatonin 3 milliGRAM(s) Oral at bedtime PRN Insomnia  traMADol 50 milliGRAM(s) Oral daily PRN Severe Pain (7 - 10)    88F with PMHx COPD (no home O2), HTN, TAVR 2019, GERD, hx spinal stenosis s/p fusion 2017 c/b PE (completed AC) presenting with SOB x3 weeks. Admitted with ADHF, Now with new onset Afib with RVR     New onset Afib with RVR:   EKG : Afib with RVR heart rate at 155 bmp  Patient is on Toprol Xl 25 mg po daily ( last dose on 12/8/23 at 0500)   Lopressor 5 mg IV x1   Stat BMP, TSH, PTT  Chadsvasc score -5   Will start heparin drip pending PTT result , risks and benefits explainer to patient who  verbalized understanding  Case discussed with [facility] Dr: Oneyda who agrees with the plan.    Addendum: 0216 :Heart rate noted to be ranging from 137 -150s on tele post administration of Lopressor 5 mg IV . Additional lopressor 5 mg IV x1 given. Patient's heart rate still not controlled despite receiving 2 nd dose of IV lopressor. Tel reviewed heart rate ranging form 130- 150. patient seen and assessed at bedside. Patient is asymptomatic.   Cardiology consult called. Spoke to Cardiology Ney SALINAS who recommend to administer Cardizem 10 mg Iv x1. Primary RN informed. Medicine Subsequent Hospital Care Note- ACP  CC: tachycardia   HPI/Subjective: Notified by RN that patient tachycardic to 140s on tele monitor. Tele strip reviewed, patient's rhythm changed to afib with RVR heart rate ranging 140- 160s around 2340. Patient seen and assessed at bedside. Patient denies chest pain, palpitation, dizziness, and  shortness of breath.     T(F): 98.2  HR: 139  BP: 123/81  RR: 18  SpO2: 95%     Telemetry/Alarms: Afib with RVR   General: No distress   Neurology: Awake and oriented x3 , follow commands   Respiratory: Clear on auscultation  CV:  S1 S2 irregular   Abdominal: Soft, NT, ND +BS,   Extremities: No edema, + peripheral pulses  Psych: Oriented x 3, normal affect  Incisions:   Tubes:  Relevant labs, radiology and Medications reviewed                        13.5   6.12  )-----------( 194      ( 08 Dec 2023 05:40 )             42.2     12-08    139  |  103  |  29<H>  ----------------------------<  287<H>  4.7   |  24  |  0.91    Ca    9.2      08 Dec 2023 05:40  Phos  4.2     12-08  Mg     2.00     12-08    TPro  6.5  /  Alb  3.7  /  TBili  0.9  /  DBili  x   /  AST  24  /  ALT  42<H>  /  AlkPhos  90  12-08    PT/INR - ( 07 Dec 2023 16:49 )   PT: 12.0 sec;   INR: 1.06 ratio         PTT - ( 07 Dec 2023 16:49 )  PTT:36.9 sec  MEDICATIONS  (STANDING):  amLODIPine   Tablet 5 milliGRAM(s) Oral daily  aspirin enteric coated 81 milliGRAM(s) Oral daily  atorvastatin 20 milliGRAM(s) Oral at bedtime  buPROPion . 75 milliGRAM(s) Oral two times a day  furosemide   Injectable 40 milliGRAM(s) IV Push two times a day  gabapentin 600 milliGRAM(s) Oral two times a day  heparin   Injectable 5000 Unit(s) SubCutaneous every 12 hours  influenza  Vaccine (HIGH DOSE) 0.7 milliLiter(s) IntraMuscular once  metoprolol succinate ER 25 milliGRAM(s) Oral daily  metoprolol tartrate Injectable 5 milliGRAM(s) IV Push once  pantoprazole    Tablet 40 milliGRAM(s) Oral before breakfast  PARoxetine 10 milliGRAM(s) Oral daily    MEDICATIONS  (PRN):  acetaminophen     Tablet .. 650 milliGRAM(s) Oral every 6 hours PRN Temp greater or equal to 38C (100.4F), Mild Pain (1 - 3)  albuterol    90 MICROgram(s) HFA Inhaler 2 Puff(s) Inhalation every 6 hours PRN Shortness of Breath and/or Wheezing  melatonin 3 milliGRAM(s) Oral at bedtime PRN Insomnia  traMADol 50 milliGRAM(s) Oral daily PRN Severe Pain (7 - 10)    88F with PMHx COPD (no home O2), HTN, TAVR 2019, GERD, hx spinal stenosis s/p fusion 2017 c/b PE (completed AC) presenting with SOB x3 weeks. Admitted with ADHF, Now with new onset Afib with RVR     New onset Afib with RVR:   EKG : Afib with RVR heart rate at 155 bmp  Patient is on Toprol Xl 25 mg po daily ( last dose on 12/8/23 at 0500)   Lopressor 5 mg IV x1   Stat BMP, TSH, PTT  Chadsvasc score -5   Will start heparin drip pending PTT result , risks and benefits explainer to patient who  verbalized understanding  Case discussed with [facility] Dr: Oneyda who agrees with the plan.    Addendum: 0216 :Heart rate noted to be ranging from 137 -150s on tele post administration of Lopressor 5 mg IV . Additional lopressor 5 mg IV x1 given. Patient's heart rate still not controlled despite receiving 2 nd dose of IV lopressor. Tel reviewed heart rate ranging form 130- 150. patient seen and assessed at bedside. Patient is asymptomatic.   Cardiology consult called. Spoke to Cardiology Ney SALINAS who recommend to administer Cardizem 10 mg Iv x1. Primary RN informed. Medicine Subsequent Hospital Care Note- ACP  CC: tachycardia   HPI/Subjective: Notified by RN that patient tachycardic to 140s on tele monitor. Tele strip reviewed, patient's rhythm changed to afib with RVR heart rate ranging 140- 160s around 2340. Patient seen and assessed at bedside. Patient denies chest pain, palpitation, dizziness, and  shortness of breath.     T(F): 98.2  HR: 139  BP: 123/81  RR: 18  SpO2: 95%     Telemetry/Alarms: Afib with RVR   General: No distress   Neurology: Awake and oriented x3 , follow commands   Respiratory: Clear on auscultation  CV:  S1 S2 irregular   Abdominal: Soft, NT, ND +BS,   Extremities: No edema, + peripheral pulses  Psych: Oriented x 3, normal affect  Incisions:   Tubes:  Relevant labs, radiology and Medications reviewed                        13.5   6.12  )-----------( 194      ( 08 Dec 2023 05:40 )             42.2     12-08    139  |  103  |  29<H>  ----------------------------<  287<H>  4.7   |  24  |  0.91    Ca    9.2      08 Dec 2023 05:40  Phos  4.2     12-08  Mg     2.00     12-08    TPro  6.5  /  Alb  3.7  /  TBili  0.9  /  DBili  x   /  AST  24  /  ALT  42<H>  /  AlkPhos  90  12-08    PT/INR - ( 07 Dec 2023 16:49 )   PT: 12.0 sec;   INR: 1.06 ratio         PTT - ( 07 Dec 2023 16:49 )  PTT:36.9 sec  MEDICATIONS  (STANDING):  amLODIPine   Tablet 5 milliGRAM(s) Oral daily  aspirin enteric coated 81 milliGRAM(s) Oral daily  atorvastatin 20 milliGRAM(s) Oral at bedtime  buPROPion . 75 milliGRAM(s) Oral two times a day  furosemide   Injectable 40 milliGRAM(s) IV Push two times a day  gabapentin 600 milliGRAM(s) Oral two times a day  heparin   Injectable 5000 Unit(s) SubCutaneous every 12 hours  influenza  Vaccine (HIGH DOSE) 0.7 milliLiter(s) IntraMuscular once  metoprolol succinate ER 25 milliGRAM(s) Oral daily  metoprolol tartrate Injectable 5 milliGRAM(s) IV Push once  pantoprazole    Tablet 40 milliGRAM(s) Oral before breakfast  PARoxetine 10 milliGRAM(s) Oral daily    MEDICATIONS  (PRN):  acetaminophen     Tablet .. 650 milliGRAM(s) Oral every 6 hours PRN Temp greater or equal to 38C (100.4F), Mild Pain (1 - 3)  albuterol    90 MICROgram(s) HFA Inhaler 2 Puff(s) Inhalation every 6 hours PRN Shortness of Breath and/or Wheezing  melatonin 3 milliGRAM(s) Oral at bedtime PRN Insomnia  traMADol 50 milliGRAM(s) Oral daily PRN Severe Pain (7 - 10)    88F with PMHx COPD (no home O2), HTN, TAVR 2019, GERD, hx spinal stenosis s/p fusion 2017 c/b PE (completed AC) presenting with SOB x3 weeks. Admitted with ADHF, Now with new onset Afib with RVR     New onset Afib with RVR:   EKG : Afib with RVR heart rate at 155 bmp  Patient is on Toprol Xl 25 mg po daily ( last dose on 12/8/23 at 0500)   Lopressor 5 mg IV x1   Stat BMP, TSH, PTT  Chadsvasc score -5   Will start heparin drip pending PTT result , risks and benefits explainer to patient who  verbalized understanding  Will monitor patient on Telemetry.  Case discussed with [facility] Dr: Oneyda who agrees with the plan.    Addendum: 0216 :Heart rate noted to be ranging from 137 -150s on tele post administration of Lopressor 5 mg IV . Additional lopressor 5 mg IV x1 given. Patient's heart rate still not controlled despite receiving 2 nd dose of IV lopressor. Tel reviewed heart rate ranging form 130- 150. patient seen and assessed at bedside. Patient is asymptomatic.   Cardiology consult called. Spoke to Cardiology Ney SALINAS who recommend to administer Cardizem 10 mg Iv x1. Primary RN informed.    Addendum: Reviewed tele post administration of Cardizem IV push, heart rate noted to be 120s -140s.   Toprol Xl discontinued and Lopressor 25 mg po BID ordered as recommended by cardiology.    Reviewed labs.  CBC with Leukocytosis. However, patient is afebrile. TSH within normal range. Medicine Subsequent Hospital Care Note- ACP  CC: tachycardia   HPI/Subjective: Notified by RN that patient tachycardic to 140s on tele monitor. Tele strip reviewed, patient's rhythm changed to afib with RVR heart rate ranging 140- 160s around 2340. Patient seen and assessed at bedside. Patient denies chest pain, palpitation, dizziness, and  shortness of breath.     T(F): 98.2  HR: 139  BP: 123/81  RR: 18  SpO2: 95%     Telemetry/Alarms: Afib with RVR   General: No distress   Neurology: Awake and oriented x3 , follow commands   Respiratory: Clear on auscultation  CV:  S1 S2 irregular   Abdominal: Soft, NT, ND +BS,   Extremities: No edema, + peripheral pulses  Psych: Oriented x 3, normal affect  Incisions:   Tubes:  Relevant labs, radiology and Medications reviewed                        13.5   6.12  )-----------( 194      ( 08 Dec 2023 05:40 )             42.2     12-08    139  |  103  |  29<H>  ----------------------------<  287<H>  4.7   |  24  |  0.91    Ca    9.2      08 Dec 2023 05:40  Phos  4.2     12-08  Mg     2.00     12-08    TPro  6.5  /  Alb  3.7  /  TBili  0.9  /  DBili  x   /  AST  24  /  ALT  42<H>  /  AlkPhos  90  12-08    PT/INR - ( 07 Dec 2023 16:49 )   PT: 12.0 sec;   INR: 1.06 ratio         PTT - ( 07 Dec 2023 16:49 )  PTT:36.9 sec  MEDICATIONS  (STANDING):  amLODIPine   Tablet 5 milliGRAM(s) Oral daily  aspirin enteric coated 81 milliGRAM(s) Oral daily  atorvastatin 20 milliGRAM(s) Oral at bedtime  buPROPion . 75 milliGRAM(s) Oral two times a day  furosemide   Injectable 40 milliGRAM(s) IV Push two times a day  gabapentin 600 milliGRAM(s) Oral two times a day  heparin   Injectable 5000 Unit(s) SubCutaneous every 12 hours  influenza  Vaccine (HIGH DOSE) 0.7 milliLiter(s) IntraMuscular once  metoprolol succinate ER 25 milliGRAM(s) Oral daily  metoprolol tartrate Injectable 5 milliGRAM(s) IV Push once  pantoprazole    Tablet 40 milliGRAM(s) Oral before breakfast  PARoxetine 10 milliGRAM(s) Oral daily    MEDICATIONS  (PRN):  acetaminophen     Tablet .. 650 milliGRAM(s) Oral every 6 hours PRN Temp greater or equal to 38C (100.4F), Mild Pain (1 - 3)  albuterol    90 MICROgram(s) HFA Inhaler 2 Puff(s) Inhalation every 6 hours PRN Shortness of Breath and/or Wheezing  melatonin 3 milliGRAM(s) Oral at bedtime PRN Insomnia  traMADol 50 milliGRAM(s) Oral daily PRN Severe Pain (7 - 10)    88F with PMHx COPD (no home O2), HTN, TAVR 2019, GERD, hx spinal stenosis s/p fusion 2017 c/b PE (completed AC) presenting with SOB x3 weeks. Admitted with ADHF, Now with new onset Afib with RVR     New onset Afib with RVR:   EKG : Afib with RVR heart rate at 155 bmp  Patient is on Toprol Xl 25 mg po daily ( last dose on 12/8/23 at 0500)   Lopressor 5 mg IV x1   Stat BMP, TSH, PTT  Chadsvasc score -5   Will start heparin drip pending PTT result , risks and benefits explainer to patient who  verbalized understanding  Will monitor patient on Telemetry.  Case discussed with [facility] Dr: Oneyda who agrees with the plan.    Addendum: 0216 :Heart rate noted to be ranging from 137 -150s on tele post administration of Lopressor 5 mg IV . Additional lopressor 5 mg IV x1 given. Patient's heart rate still not controlled despite receiving 2 nd dose of IV lopressor. Tel reviewed heart rate ranging form 130- 150. patient seen and assessed at bedside. Patient is asymptomatic.   Cardiology consult called. Spoke to Cardiology Ney SALINAS who recommend to administer Cardizem 10 mg Iv x1. Primary RN informed.    Addendum: Reviewed tele post administration of Cardizem IV push, heart rate noted to be 120s -140s.   Toprol Xl discontinued and Lopressor 25 mg po BID ordered as recommended by cardiology.    Reviewed labs.  CBC with Leukocytosis. However, patient is afebrile. TSH within normal range. Medicine Subsequent Hospital Care Note- ACP  CC: tachycardia   HPI/Subjective: Notified by RN that patient tachycardic to 140s on tele monitor. Tele strip reviewed, patient's rhythm changed to afib with RVR heart rate ranging 140- 160s around 2340. Patient seen and assessed at bedside. Patient denies chest pain, palpitation, dizziness, and  shortness of breath.     T(F): 98.2  HR: 139  BP: 123/81  RR: 18  SpO2: 95%     Telemetry/Alarms: Afib with RVR   General: No distress   Neurology: Awake and oriented x3 , follow commands   Respiratory: Clear on auscultation  CV:  S1 S2 irregular   Abdominal: Soft, NT, ND +BS,   Extremities: No edema, + peripheral pulses  Psych: Oriented x 3, normal affect  Incisions:   Tubes:  Relevant labs, radiology and Medications reviewed                        13.5   6.12  )-----------( 194      ( 08 Dec 2023 05:40 )             42.2     12-08    139  |  103  |  29<H>  ----------------------------<  287<H>  4.7   |  24  |  0.91    Ca    9.2      08 Dec 2023 05:40  Phos  4.2     12-08  Mg     2.00     12-08    TPro  6.5  /  Alb  3.7  /  TBili  0.9  /  DBili  x   /  AST  24  /  ALT  42<H>  /  AlkPhos  90  12-08    PT/INR - ( 07 Dec 2023 16:49 )   PT: 12.0 sec;   INR: 1.06 ratio         PTT - ( 07 Dec 2023 16:49 )  PTT:36.9 sec  MEDICATIONS  (STANDING):  amLODIPine   Tablet 5 milliGRAM(s) Oral daily  aspirin enteric coated 81 milliGRAM(s) Oral daily  atorvastatin 20 milliGRAM(s) Oral at bedtime  buPROPion . 75 milliGRAM(s) Oral two times a day  furosemide   Injectable 40 milliGRAM(s) IV Push two times a day  gabapentin 600 milliGRAM(s) Oral two times a day  heparin   Injectable 5000 Unit(s) SubCutaneous every 12 hours  influenza  Vaccine (HIGH DOSE) 0.7 milliLiter(s) IntraMuscular once  metoprolol succinate ER 25 milliGRAM(s) Oral daily  metoprolol tartrate Injectable 5 milliGRAM(s) IV Push once  pantoprazole    Tablet 40 milliGRAM(s) Oral before breakfast  PARoxetine 10 milliGRAM(s) Oral daily    MEDICATIONS  (PRN):  acetaminophen     Tablet .. 650 milliGRAM(s) Oral every 6 hours PRN Temp greater or equal to 38C (100.4F), Mild Pain (1 - 3)  albuterol    90 MICROgram(s) HFA Inhaler 2 Puff(s) Inhalation every 6 hours PRN Shortness of Breath and/or Wheezing  melatonin 3 milliGRAM(s) Oral at bedtime PRN Insomnia  traMADol 50 milliGRAM(s) Oral daily PRN Severe Pain (7 - 10)    88F with PMHx COPD (no home O2), HTN, TAVR 2019, GERD, hx spinal stenosis s/p fusion 2017 c/b PE (completed AC) presenting with SOB x3 weeks. Admitted with ADHF, Now with new onset Afib with RVR     New onset Afib with RVR:   EKG : Afib with RVR heart rate at 155 bmp  Patient is on Toprol Xl 25 mg po daily ( last dose on 12/8/23 at 0500)   Lopressor 5 mg IV x1   Stat BMP, TSH, PTT  Chadsvasc score -5   Will start heparin drip pending PTT result , risks and benefits explainer to patient who  verbalized understanding  Will monitor patient on Telemetry.  Case discussed with [facility] Dr: Oneyda who agrees with the plan.    Addendum: 0216 :Heart rate noted to be ranging from 137 -150s on tele post administration of Lopressor 5 mg IV . Additional lopressor 5 mg IV x1 given. Patient's heart rate still not controlled despite receiving 2 nd dose of IV lopressor. Tel reviewed heart rate ranging form 130- 150. patient seen and assessed at bedside. Patient is asymptomatic.   Cardiology consult called. Spoke to Cardiology Ney SALINAS who recommend to administer Cardizem 10 mg Iv x1. Primary RN informed.    Addendum: Reviewed tele post administration of Cardizem IV push, heart rate noted to be 120s -140s.   Toprol Xl discontinued and Lopressor 25 mg po BID ordered as recommended by cardiology.    Reviewed labs.  CBC with Leukocytosis. However, patient is afebrile. TSH within normal range.    Addendum: 0443: Notified by RN that patient's BP is 96/66 at 0320. As per RN heart rate is ranging from 120- 140s. Patient is asymptomatic. Recommended RN to repeat BP at 0400. Repeat BP noted to be 92/62. Patient seen and assessed at bedside. Heart range is ranging from high 120s to 150s.  Patient is asymptomatic. On exam. lungs clear on auscultation.   Will administer 250 ml NS. Discussed with cardiology PA who aggress with the plan.   Will continue to monitor. Medicine Subsequent Hospital Care Note- ACP  CC: tachycardia   HPI/Subjective: Notified by RN that patient tachycardic to 140s on tele monitor. Tele strip reviewed, patient's rhythm changed to afib with RVR heart rate ranging 140- 160s around 2340. Patient seen and assessed at bedside. Patient denies chest pain, palpitation, dizziness, and  shortness of breath.     T(F): 98.2  HR: 139  BP: 123/81  RR: 18  SpO2: 95%     Telemetry/Alarms: Afib with RVR   General: No distress   Neurology: Awake and oriented x3 , follow commands   Respiratory: Clear on auscultation  CV:  S1 S2 irregular   Abdominal: Soft, NT, ND +BS,   Extremities: No edema, + peripheral pulses  Psych: Oriented x 3, normal affect  Incisions:   Tubes:  Relevant labs, radiology and Medications reviewed                        13.5   6.12  )-----------( 194      ( 08 Dec 2023 05:40 )             42.2     12-08    139  |  103  |  29<H>  ----------------------------<  287<H>  4.7   |  24  |  0.91    Ca    9.2      08 Dec 2023 05:40  Phos  4.2     12-08  Mg     2.00     12-08    TPro  6.5  /  Alb  3.7  /  TBili  0.9  /  DBili  x   /  AST  24  /  ALT  42<H>  /  AlkPhos  90  12-08    PT/INR - ( 07 Dec 2023 16:49 )   PT: 12.0 sec;   INR: 1.06 ratio         PTT - ( 07 Dec 2023 16:49 )  PTT:36.9 sec  MEDICATIONS  (STANDING):  amLODIPine   Tablet 5 milliGRAM(s) Oral daily  aspirin enteric coated 81 milliGRAM(s) Oral daily  atorvastatin 20 milliGRAM(s) Oral at bedtime  buPROPion . 75 milliGRAM(s) Oral two times a day  furosemide   Injectable 40 milliGRAM(s) IV Push two times a day  gabapentin 600 milliGRAM(s) Oral two times a day  heparin   Injectable 5000 Unit(s) SubCutaneous every 12 hours  influenza  Vaccine (HIGH DOSE) 0.7 milliLiter(s) IntraMuscular once  metoprolol succinate ER 25 milliGRAM(s) Oral daily  metoprolol tartrate Injectable 5 milliGRAM(s) IV Push once  pantoprazole    Tablet 40 milliGRAM(s) Oral before breakfast  PARoxetine 10 milliGRAM(s) Oral daily    MEDICATIONS  (PRN):  acetaminophen     Tablet .. 650 milliGRAM(s) Oral every 6 hours PRN Temp greater or equal to 38C (100.4F), Mild Pain (1 - 3)  albuterol    90 MICROgram(s) HFA Inhaler 2 Puff(s) Inhalation every 6 hours PRN Shortness of Breath and/or Wheezing  melatonin 3 milliGRAM(s) Oral at bedtime PRN Insomnia  traMADol 50 milliGRAM(s) Oral daily PRN Severe Pain (7 - 10)    88F with PMHx COPD (no home O2), HTN, TAVR 2019, GERD, hx spinal stenosis s/p fusion 2017 c/b PE (completed AC) presenting with SOB x3 weeks. Admitted with ADHF, Now with new onset Afib with RVR     New onset Afib with RVR:   EKG : Afib with RVR heart rate at 155 bmp  Patient is on Toprol Xl 25 mg po daily ( last dose on 12/8/23 at 0500)   Lopressor 5 mg IV x1   Stat BMP, TSH, PTT  Chadsvasc score -5   Will start heparin drip pending PTT result , risks and benefits explainer to patient who  verbalized understanding  Will monitor patient on Telemetry.  Case discussed with [facility] Dr: Oneyda who agrees with the plan.    Addendum: 0216 :Heart rate noted to be ranging from 137 -150s on tele post administration of Lopressor 5 mg IV . Additional lopressor 5 mg IV x1 given. Patient's heart rate still not controlled despite receiving 2 nd dose of IV lopressor. Tel reviewed heart rate ranging form 130- 150. patient seen and assessed at bedside. Patient is asymptomatic.   Cardiology consult called. Spoke to Cardiology Ney SALINAS who recommend to administer Cardizem 10 mg Iv x1. Primary RN informed.    Addendum: Reviewed tele post administration of Cardizem IV push, heart rate noted to be 120s -140s.   Toprol Xl discontinued and Lopressor 25 mg po BID ordered as recommended by cardiology.    Reviewed labs.  CBC with Leukocytosis. However, patient is afebrile. TSH within normal range.    Addendum: 0443: Notified by RN that patient's BP is 96/66 at 0320. As per RN heart rate is ranging from 120- 140s. Patient is asymptomatic. Recommended RN to repeat BP at 0400. Repeat BP noted to be 92/62. Patient seen and assessed at bedside. Heart range is ranging from high 120s to 150s.  Patient is asymptomatic. On exam. lungs clear on auscultation.   Will administer 250 ml NS. Discussed with cardiology PA who aggress with the plan.   Will continue to monitor. Medicine Subsequent Hospital Care Note- ACP  CC: tachycardia   HPI/Subjective: Notified by RN that patient tachycardic to 140s on tele monitor. Tele strip reviewed, patient's rhythm changed to afib with RVR heart rate ranging 140- 160s around 2340. Patient seen and assessed at bedside. Patient denies chest pain, palpitation, dizziness, and  shortness of breath.     T(F): 98.2  HR: 139  BP: 123/81  RR: 18  SpO2: 95%     Telemetry/Alarms: Afib with RVR   General: No distress   Neurology: Awake and oriented x3 , follow commands   Respiratory: Clear on auscultation  CV:  S1 S2 irregular   Abdominal: Soft, NT, ND +BS,   Extremities: No edema, + peripheral pulses  Psych: Oriented x 3, normal affect  Incisions:   Tubes:  Relevant labs, radiology and Medications reviewed                        13.5   6.12  )-----------( 194      ( 08 Dec 2023 05:40 )             42.2     12-08    139  |  103  |  29<H>  ----------------------------<  287<H>  4.7   |  24  |  0.91    Ca    9.2      08 Dec 2023 05:40  Phos  4.2     12-08  Mg     2.00     12-08    TPro  6.5  /  Alb  3.7  /  TBili  0.9  /  DBili  x   /  AST  24  /  ALT  42<H>  /  AlkPhos  90  12-08    PT/INR - ( 07 Dec 2023 16:49 )   PT: 12.0 sec;   INR: 1.06 ratio         PTT - ( 07 Dec 2023 16:49 )  PTT:36.9 sec  MEDICATIONS  (STANDING):  amLODIPine   Tablet 5 milliGRAM(s) Oral daily  aspirin enteric coated 81 milliGRAM(s) Oral daily  atorvastatin 20 milliGRAM(s) Oral at bedtime  buPROPion . 75 milliGRAM(s) Oral two times a day  furosemide   Injectable 40 milliGRAM(s) IV Push two times a day  gabapentin 600 milliGRAM(s) Oral two times a day  heparin   Injectable 5000 Unit(s) SubCutaneous every 12 hours  influenza  Vaccine (HIGH DOSE) 0.7 milliLiter(s) IntraMuscular once  metoprolol succinate ER 25 milliGRAM(s) Oral daily  metoprolol tartrate Injectable 5 milliGRAM(s) IV Push once  pantoprazole    Tablet 40 milliGRAM(s) Oral before breakfast  PARoxetine 10 milliGRAM(s) Oral daily    MEDICATIONS  (PRN):  acetaminophen     Tablet .. 650 milliGRAM(s) Oral every 6 hours PRN Temp greater or equal to 38C (100.4F), Mild Pain (1 - 3)  albuterol    90 MICROgram(s) HFA Inhaler 2 Puff(s) Inhalation every 6 hours PRN Shortness of Breath and/or Wheezing  melatonin 3 milliGRAM(s) Oral at bedtime PRN Insomnia  traMADol 50 milliGRAM(s) Oral daily PRN Severe Pain (7 - 10)    88F with PMHx COPD (no home O2), HTN, TAVR 2019, GERD, hx spinal stenosis s/p fusion 2017 c/b PE (completed AC) presenting with SOB x3 weeks. Admitted with ADHF, Now with new onset Afib with RVR     New onset Afib with RVR:   EKG : Afib with RVR heart rate at 155 bmp  Patient is on Toprol Xl 25 mg po daily ( last dose on 12/8/23 at 0500)   Lopressor 5 mg IV x1   Stat BMP, TSH, PTT  Chadsvasc score -5   Will start heparin drip pending PTT result , risks and benefits explainer to patient who  verbalized understanding  Will monitor patient on Telemetry.  Case discussed with [facility] Dr: Oneyda who agrees with the plan.    Addendum: 0216 :Heart rate noted to be ranging from 137 -150s on tele post administration of Lopressor 5 mg IV . Additional lopressor 5 mg IV x1 given. Patient's heart rate still not controlled despite receiving 2 nd dose of IV lopressor. Tel reviewed heart rate ranging form 130- 150. patient seen and assessed at bedside. Patient is asymptomatic.   Cardiology consult called. Spoke to Cardiology PA , Sammy Ferrara who recommend to administer Cardizem 10 mg Iv x1. Primary RN informed.    Addendum: Reviewed tele post administration of Cardizem IV push, heart rate noted to be 120s -140s.   Toprol Xl discontinued and Lopressor 25 mg po BID ordered as recommended by cardiology.    Reviewed labs.  CBC with Leukocytosis. However, patient is afebrile. TSH within normal range.    Addendum: 0443: Notified by RN that patient's BP is 96/66 at 0320. As per RN heart rate is ranging from 120- 140s. Patient is asymptomatic. Recommended RN to repeat BP at 0400. Repeat BP noted to be 92/62. Patient seen and assessed at bedside. Heart range is ranging from high 120s to 150s.  Patient is asymptomatic. On exam. lungs clear on auscultation.   Will administer 250 ml NS. Discussed with cardiology BRAD ferrara  who aggress with the plan.   Will continue to monitor.    Addendum: BP post bolus noted 96. Patient's heart rate on tele monitor noted to be ranging from 130-150s . Patient is asymptomatic. Spoke to cardiology BRAD Ferrara who recommended to monitor patient at this given patient is asymptomatic. Recommended RN to repeat BP after an hour. Will follow up with EP in AM. Medicine Subsequent Hospital Care Note- ACP  CC: tachycardia   HPI/Subjective: Notified by RN that patient tachycardic to 140s on tele monitor. Tele strip reviewed, patient's rhythm changed to afib with RVR heart rate ranging 140- 160s around 2340. Patient seen and assessed at bedside. Patient denies chest pain, palpitation, dizziness, and  shortness of breath.     T(F): 98.2  HR: 139  BP: 123/81  RR: 18  SpO2: 95%     Telemetry/Alarms: Afib with RVR   General: No distress   Neurology: Awake and oriented x3 , follow commands   Respiratory: Clear on auscultation  CV:  S1 S2 irregular   Abdominal: Soft, NT, ND +BS,   Extremities: No edema, + peripheral pulses  Psych: Oriented x 3, normal affect  Incisions:   Tubes:  Relevant labs, radiology and Medications reviewed                        13.5   6.12  )-----------( 194      ( 08 Dec 2023 05:40 )             42.2     12-08    139  |  103  |  29<H>  ----------------------------<  287<H>  4.7   |  24  |  0.91    Ca    9.2      08 Dec 2023 05:40  Phos  4.2     12-08  Mg     2.00     12-08    TPro  6.5  /  Alb  3.7  /  TBili  0.9  /  DBili  x   /  AST  24  /  ALT  42<H>  /  AlkPhos  90  12-08    PT/INR - ( 07 Dec 2023 16:49 )   PT: 12.0 sec;   INR: 1.06 ratio         PTT - ( 07 Dec 2023 16:49 )  PTT:36.9 sec  MEDICATIONS  (STANDING):  amLODIPine   Tablet 5 milliGRAM(s) Oral daily  aspirin enteric coated 81 milliGRAM(s) Oral daily  atorvastatin 20 milliGRAM(s) Oral at bedtime  buPROPion . 75 milliGRAM(s) Oral two times a day  furosemide   Injectable 40 milliGRAM(s) IV Push two times a day  gabapentin 600 milliGRAM(s) Oral two times a day  heparin   Injectable 5000 Unit(s) SubCutaneous every 12 hours  influenza  Vaccine (HIGH DOSE) 0.7 milliLiter(s) IntraMuscular once  metoprolol succinate ER 25 milliGRAM(s) Oral daily  metoprolol tartrate Injectable 5 milliGRAM(s) IV Push once  pantoprazole    Tablet 40 milliGRAM(s) Oral before breakfast  PARoxetine 10 milliGRAM(s) Oral daily    MEDICATIONS  (PRN):  acetaminophen     Tablet .. 650 milliGRAM(s) Oral every 6 hours PRN Temp greater or equal to 38C (100.4F), Mild Pain (1 - 3)  albuterol    90 MICROgram(s) HFA Inhaler 2 Puff(s) Inhalation every 6 hours PRN Shortness of Breath and/or Wheezing  melatonin 3 milliGRAM(s) Oral at bedtime PRN Insomnia  traMADol 50 milliGRAM(s) Oral daily PRN Severe Pain (7 - 10)    88F with PMHx COPD (no home O2), HTN, TAVR 2019, GERD, hx spinal stenosis s/p fusion 2017 c/b PE (completed AC) presenting with SOB x3 weeks. Admitted with ADHF, Now with new onset Afib with RVR     New onset Afib with RVR:   EKG : Afib with RVR heart rate at 155 bmp  Patient is on Toprol Xl 25 mg po daily ( last dose on 12/8/23 at 0500)   Lopressor 5 mg IV x1   Stat BMP, TSH, PTT  Chadsvasc score -5   Will start heparin drip pending PTT result , risks and benefits explainer to patient who  verbalized understanding  Will monitor patient on Telemetry.  Case discussed with [facility] Dr: Oneyda who agrees with the plan.    Addendum: 0216 :Heart rate noted to be ranging from 137 -150s on tele post administration of Lopressor 5 mg IV . Additional lopressor 5 mg IV x1 given. Patient's heart rate still not controlled despite receiving 2 nd dose of IV lopressor. Tel reviewed heart rate ranging form 130- 150. patient seen and assessed at bedside. Patient is asymptomatic.   Cardiology consult called. Spoke to Cardiology PA , Sammy Ferrara who recommend to administer Cardizem 10 mg Iv x1. Primary RN informed.    Addendum: Reviewed tele post administration of Cardizem IV push, heart rate noted to be 120s -140s.   Toprol Xl discontinued and Lopressor 25 mg po BID ordered as recommended by cardiology.    Reviewed labs.  CBC with Leukocytosis. However, patient is afebrile. TSH within normal range.    Addendum: 0443: Notified by RN that patient's BP is 96/66 at 0320. As per RN heart rate is ranging from 120- 140s. Patient is asymptomatic. Recommended RN to repeat BP at 0400. Repeat BP noted to be 92/62. Patient seen and assessed at bedside. Heart range is ranging from high 120s to 150s.  Patient is asymptomatic. On exam. lungs clear on auscultation.   Will administer 250 ml NS. Discussed with cardiology BRAD ferrara  who aggress with the plan.   Will continue to monitor.    Addendum: BP post bolus noted 96. Patient's heart rate on tele monitor noted to be ranging from 130-150s . Patient is asymptomatic. Spoke to cardiology BRAD Ferrara who recommended to monitor patient at this given patient is asymptomatic. Recommended RN to repeat BP after an hour. Will follow up with EP in AM. Medicine Subsequent Hospital Care Note- ACP  CC: tachycardia   HPI/Subjective: Notified by RN that patient tachycardic to 140s on tele monitor. Tele strip reviewed, patient's rhythm changed to afib with RVR heart rate ranging 140- 160s around 2340. Patient seen and assessed at bedside. Patient denies chest pain, palpitation, dizziness, and  shortness of breath.     T(F): 98.2  HR: 139  BP: 123/81  RR: 18  SpO2: 95%     Telemetry/Alarms: Afib with RVR   General: No distress   Neurology: Awake and oriented x3 , follow commands   Respiratory: Clear on auscultation  CV:  S1 S2 irregular   Abdominal: Soft, NT, ND +BS,   Extremities: No edema, + peripheral pulses  Psych: Oriented x 3, normal affect  Incisions:   Tubes:  Relevant labs, radiology and Medications reviewed                        13.5   6.12  )-----------( 194      ( 08 Dec 2023 05:40 )             42.2     12-08    139  |  103  |  29<H>  ----------------------------<  287<H>  4.7   |  24  |  0.91    Ca    9.2      08 Dec 2023 05:40  Phos  4.2     12-08  Mg     2.00     12-08    TPro  6.5  /  Alb  3.7  /  TBili  0.9  /  DBili  x   /  AST  24  /  ALT  42<H>  /  AlkPhos  90  12-08    PT/INR - ( 07 Dec 2023 16:49 )   PT: 12.0 sec;   INR: 1.06 ratio         PTT - ( 07 Dec 2023 16:49 )  PTT:36.9 sec  MEDICATIONS  (STANDING):  amLODIPine   Tablet 5 milliGRAM(s) Oral daily  aspirin enteric coated 81 milliGRAM(s) Oral daily  atorvastatin 20 milliGRAM(s) Oral at bedtime  buPROPion . 75 milliGRAM(s) Oral two times a day  furosemide   Injectable 40 milliGRAM(s) IV Push two times a day  gabapentin 600 milliGRAM(s) Oral two times a day  heparin   Injectable 5000 Unit(s) SubCutaneous every 12 hours  influenza  Vaccine (HIGH DOSE) 0.7 milliLiter(s) IntraMuscular once  metoprolol succinate ER 25 milliGRAM(s) Oral daily  metoprolol tartrate Injectable 5 milliGRAM(s) IV Push once  pantoprazole    Tablet 40 milliGRAM(s) Oral before breakfast  PARoxetine 10 milliGRAM(s) Oral daily    MEDICATIONS  (PRN):  acetaminophen     Tablet .. 650 milliGRAM(s) Oral every 6 hours PRN Temp greater or equal to 38C (100.4F), Mild Pain (1 - 3)  albuterol    90 MICROgram(s) HFA Inhaler 2 Puff(s) Inhalation every 6 hours PRN Shortness of Breath and/or Wheezing  melatonin 3 milliGRAM(s) Oral at bedtime PRN Insomnia  traMADol 50 milliGRAM(s) Oral daily PRN Severe Pain (7 - 10)    88F with PMHx COPD (no home O2), HTN, TAVR 2019, GERD, hx spinal stenosis s/p fusion 2017 c/b PE (completed AC) presenting with SOB x3 weeks. Admitted with ADHF, Now with new onset Afib with RVR     New onset Afib with RVR:   EKG : Afib with RVR heart rate at 155 bmp  Patient is on Toprol Xl 25 mg po daily ( last dose on 12/8/23 at 0500)   Lopressor 5 mg IV x1   Stat BMP, TSH, PTT  Chadsvasc score -5   Will start heparin drip pending PTT result , risks and benefits explainer to patient who  verbalized understanding  Will monitor patient on Telemetry.  Case discussed with [facility] Dr: Oneyda who agrees with the plan.    Addendum: 0216 :Heart rate noted to be ranging from 137 -150s on tele post administration of Lopressor 5 mg IV . Additional lopressor 5 mg IV x1 given. Patient's heart rate still not controlled despite receiving 2 nd dose of IV lopressor. Tel reviewed heart rate ranging form 130- 150. patient seen and assessed at bedside. Patient is asymptomatic.   Cardiology consult called. Spoke to Cardiology PA , Sammy Ferrara who recommend to administer Cardizem 10 mg Iv x1. Primary RN informed.    Addendum: Reviewed tele post administration of Cardizem IV push, heart rate noted to be 120s -140s.   Toprol Xl discontinued and Lopressor 25 mg po BID ordered as recommended by cardiology.    Reviewed labs.  CBC with Leukocytosis. However, patient is afebrile. TSH within normal range.    Addendum: 0443: Notified by RN that patient's BP is 96/66 at 0320. As per RN heart rate is ranging from 120- 140s. Patient is asymptomatic. Recommended RN to repeat BP at 0400. Repeat BP noted to be 92/62. Patient seen and assessed at bedside. Heart range is ranging from high 120s to 150s.  Patient is asymptomatic. On exam. lungs clear on auscultation.   Will administer 250 ml NS. Discussed with cardiology BRAD ferrara  who aggress with the plan.   Will continue to monitor.    Addendum: BP post bolus noted 96/76 . Patient's heart rate on tele monitor noted to be ranging from 130-150s . Patient is asymptomatic. Spoke to cardiology BRAD Ferrara who recommended to monitor patient at this given patient is asymptomatic. Recommended RN to repeat BP after an hour. Will follow up with EP in AM.  Repeat BP noted to be 101/ 66. Recommended Rn to administer po lopressor. Will continue to monitor. Medicine Subsequent Hospital Care Note- ACP  CC: tachycardia   HPI/Subjective: Notified by RN that patient tachycardic to 140s on tele monitor. Tele strip reviewed, patient's rhythm changed to afib with RVR heart rate ranging 140- 160s around 2340. Patient seen and assessed at bedside. Patient denies chest pain, palpitation, dizziness, and  shortness of breath.     T(F): 98.2  HR: 139  BP: 123/81  RR: 18  SpO2: 95%     Telemetry/Alarms: Afib with RVR   General: No distress   Neurology: Awake and oriented x3 , follow commands   Respiratory: Clear on auscultation  CV:  S1 S2 irregular   Abdominal: Soft, NT, ND +BS,   Extremities: No edema, + peripheral pulses  Psych: Oriented x 3, normal affect  Incisions:   Tubes:  Relevant labs, radiology and Medications reviewed                        13.5   6.12  )-----------( 194      ( 08 Dec 2023 05:40 )             42.2     12-08    139  |  103  |  29<H>  ----------------------------<  287<H>  4.7   |  24  |  0.91    Ca    9.2      08 Dec 2023 05:40  Phos  4.2     12-08  Mg     2.00     12-08    TPro  6.5  /  Alb  3.7  /  TBili  0.9  /  DBili  x   /  AST  24  /  ALT  42<H>  /  AlkPhos  90  12-08    PT/INR - ( 07 Dec 2023 16:49 )   PT: 12.0 sec;   INR: 1.06 ratio         PTT - ( 07 Dec 2023 16:49 )  PTT:36.9 sec  MEDICATIONS  (STANDING):  amLODIPine   Tablet 5 milliGRAM(s) Oral daily  aspirin enteric coated 81 milliGRAM(s) Oral daily  atorvastatin 20 milliGRAM(s) Oral at bedtime  buPROPion . 75 milliGRAM(s) Oral two times a day  furosemide   Injectable 40 milliGRAM(s) IV Push two times a day  gabapentin 600 milliGRAM(s) Oral two times a day  heparin   Injectable 5000 Unit(s) SubCutaneous every 12 hours  influenza  Vaccine (HIGH DOSE) 0.7 milliLiter(s) IntraMuscular once  metoprolol succinate ER 25 milliGRAM(s) Oral daily  metoprolol tartrate Injectable 5 milliGRAM(s) IV Push once  pantoprazole    Tablet 40 milliGRAM(s) Oral before breakfast  PARoxetine 10 milliGRAM(s) Oral daily    MEDICATIONS  (PRN):  acetaminophen     Tablet .. 650 milliGRAM(s) Oral every 6 hours PRN Temp greater or equal to 38C (100.4F), Mild Pain (1 - 3)  albuterol    90 MICROgram(s) HFA Inhaler 2 Puff(s) Inhalation every 6 hours PRN Shortness of Breath and/or Wheezing  melatonin 3 milliGRAM(s) Oral at bedtime PRN Insomnia  traMADol 50 milliGRAM(s) Oral daily PRN Severe Pain (7 - 10)    88F with PMHx COPD (no home O2), HTN, TAVR 2019, GERD, hx spinal stenosis s/p fusion 2017 c/b PE (completed AC) presenting with SOB x3 weeks. Admitted with ADHF, Now with new onset Afib with RVR     New onset Afib with RVR:   EKG : Afib with RVR heart rate at 155 bmp  Patient is on Toprol Xl 25 mg po daily ( last dose on 12/8/23 at 0500)   Lopressor 5 mg IV x1   Stat BMP, TSH, PTT  Chadsvasc score -5   Will start heparin drip pending PTT result , risks and benefits explainer to patient who  verbalized understanding  Will monitor patient on Telemetry.  Case discussed with [facility] Dr: Oneyda who agrees with the plan.    Addendum: 0216 :Heart rate noted to be ranging from 137 -150s on tele post administration of Lopressor 5 mg IV . Additional lopressor 5 mg IV x1 given. Patient's heart rate still not controlled despite receiving 2 nd dose of IV lopressor. Tel reviewed heart rate ranging form 130- 150. patient seen and assessed at bedside. Patient is asymptomatic.   Cardiology consult called. Spoke to Cardiology PA , Sammy Ferrara who recommend to administer Cardizem 10 mg Iv x1. Primary RN informed.    Addendum: Reviewed tele post administration of Cardizem IV push, heart rate noted to be 120s -140s.   Toprol Xl discontinued and Lopressor 25 mg po BID ordered as recommended by cardiology.    Reviewed labs.  CBC with Leukocytosis. However, patient is afebrile. TSH within normal range.    Addendum: 0443: Notified by RN that patient's BP is 96/66 at 0320. As per RN heart rate is ranging from 120- 140s. Patient is asymptomatic. Recommended RN to repeat BP at 0400. Repeat BP noted to be 92/62. Patient seen and assessed at bedside. Heart range is ranging from high 120s to 150s.  Patient is asymptomatic. On exam. lungs clear on auscultation.   Will administer 250 ml NS. Discussed with cardiology BRAD ferrara  who aggress with the plan.   Will continue to monitor.    Addendum: BP post bolus noted 96/76 . Patient's heart rate on tele monitor noted to be ranging from 130-150s . Patient is asymptomatic. Spoke to cardiology BRAD Ferrara who recommended to monitor patient at this given patient is asymptomatic. Recommended RN to repeat BP after an hour. Will follow up with EP in AM.  Repeat BP noted to be 101/ 66. Recommended Rn to administer po lopressor. Will continue to monitor.

## 2023-12-09 NOTE — PROGRESS NOTE ADULT - PROBLEM SELECTOR PLAN 1
No significant LE edema but with SOB/BROCK, elevated probnp, mild crackles on exam, small bilateral pleural effusions.  CT neg for PE.   Has hx TAVR and severe MR.  2019 normal EF 70%--stage 2 diastolic HF.   - c/w tele  - Diuresed w/ iv lasix 40 bid, hold for now per Cards as she may be overdiuresed  - wean O2 as tolerated, not on baseline home O2  - rate control for Afib as below, inc metoprolol to 50 mg bid  - f/u cardiology

## 2023-12-09 NOTE — CONSULT NOTE ADULT - CARDIOVASCULAR
normal/regular rate and rhythm/S1 S2 present/no gallops/no rub/no murmur/Irregularly irregular rhythm normal/regular rate and rhythm/S1 S2 present/no gallops/no rub/no murmur/Irregularly irregular rhythm/tachycardia

## 2023-12-09 NOTE — CONSULT NOTE ADULT - SUBJECTIVE AND OBJECTIVE BOX
HPI:    ROS as above    T(C): 36.8 (12-08-23 @ 23:50), Max: 36.8 (12-08-23 @ 23:50)  HR: 134 (12-09-23 @ 04:10) (61 - 140)  BP: 92/62 (12-09-23 @ 04:10) (92/62 - 134/71)  RR: 18 (12-09-23 @ 00:50) (18 - 19)  SpO2: 94% (12-09-23 @ 00:50) (94% - 100%)    MEDICATIONS  (STANDING):  amLODIPine   Tablet 5 milliGRAM(s) Oral daily  aspirin enteric coated 81 milliGRAM(s) Oral daily  atorvastatin 20 milliGRAM(s) Oral at bedtime  buPROPion . 75 milliGRAM(s) Oral two times a day  furosemide   Injectable 40 milliGRAM(s) IV Push two times a day  gabapentin 600 milliGRAM(s) Oral two times a day  heparin  Infusion.  Unit(s)/Hr (9 mL/Hr) IV Continuous <Continuous>  influenza  Vaccine (HIGH DOSE) 0.7 milliLiter(s) IntraMuscular once  metoprolol tartrate 25 milliGRAM(s) Oral two times a day  pantoprazole    Tablet 40 milliGRAM(s) Oral before breakfast  PARoxetine 10 milliGRAM(s) Oral daily    MEDICATIONS  (PRN):  acetaminophen     Tablet .. 650 milliGRAM(s) Oral every 6 hours PRN Temp greater or equal to 38C (100.4F), Mild Pain (1 - 3)  albuterol    90 MICROgram(s) HFA Inhaler 2 Puff(s) Inhalation every 6 hours PRN Shortness of Breath and/or Wheezing  heparin   Injectable 2000 Unit(s) IV Push every 6 hours PRN For aPTT between 40 - 57  heparin   Injectable 4000 Unit(s) IV Push every 6 hours PRN For aPTT less than 40  melatonin 3 milliGRAM(s) Oral at bedtime PRN Insomnia  traMADol 50 milliGRAM(s) Oral daily PRN Severe Pain (7 - 10)      I&O's Summary                        14.8                 139  | 24   | 46           14.07 >-----------< 229     ------------------------< 240                   45.5                 4.5  | 101  | 1.11                                         Ca 9.3   Mg 2.30  Ph 3.1    Urinalysis Basic - ( 09 Dec 2023 00:28 )    Color: x / Appearance: x / SG: x / pH: x  Gluc: 240 mg/dL / Ketone: x  / Bili: x / Urobili: x   Blood: x / Protein: x / Nitrite: x   Leuk Esterase: x / RBC: x / WBC x   Sq Epi: x / Non Sq Epi: x / Bacteria: x        PT/INR - ( 07 Dec 2023 16:49 )   PT: 12.0 sec;   INR: 1.06 ratio         PTT - ( 09 Dec 2023 00:28 )  PTT:33.0 sec     HPI: 88F with PMHx COPD (no home O2), HTN, TAVR 2019, GERD, hx spinal stenosis s/p fusion 2017 c/b PE (completed AC) presenting with SOB x3 weeks. Cardiology consulted for AF w RVR.     Pt intially presented to ED due to worsening sob.     ROS as above    T(C): 36.8 (12-08-23 @ 23:50), Max: 36.8 (12-08-23 @ 23:50)  HR: 134 (12-09-23 @ 04:10) (61 - 140)  BP: 92/62 (12-09-23 @ 04:10) (92/62 - 134/71)  RR: 18 (12-09-23 @ 00:50) (18 - 19)  SpO2: 94% (12-09-23 @ 00:50) (94% - 100%)    MEDICATIONS  (STANDING):  amLODIPine   Tablet 5 milliGRAM(s) Oral daily  aspirin enteric coated 81 milliGRAM(s) Oral daily  atorvastatin 20 milliGRAM(s) Oral at bedtime  buPROPion . 75 milliGRAM(s) Oral two times a day  furosemide   Injectable 40 milliGRAM(s) IV Push two times a day  gabapentin 600 milliGRAM(s) Oral two times a day  heparin  Infusion.  Unit(s)/Hr (9 mL/Hr) IV Continuous <Continuous>  influenza  Vaccine (HIGH DOSE) 0.7 milliLiter(s) IntraMuscular once  metoprolol tartrate 25 milliGRAM(s) Oral two times a day  pantoprazole    Tablet 40 milliGRAM(s) Oral before breakfast  PARoxetine 10 milliGRAM(s) Oral daily    MEDICATIONS  (PRN):  acetaminophen     Tablet .. 650 milliGRAM(s) Oral every 6 hours PRN Temp greater or equal to 38C (100.4F), Mild Pain (1 - 3)  albuterol    90 MICROgram(s) HFA Inhaler 2 Puff(s) Inhalation every 6 hours PRN Shortness of Breath and/or Wheezing  heparin   Injectable 2000 Unit(s) IV Push every 6 hours PRN For aPTT between 40 - 57  heparin   Injectable 4000 Unit(s) IV Push every 6 hours PRN For aPTT less than 40  melatonin 3 milliGRAM(s) Oral at bedtime PRN Insomnia  traMADol 50 milliGRAM(s) Oral daily PRN Severe Pain (7 - 10)      I&O's Summary                        14.8                 139  | 24   | 46           14.07 >-----------< 229     ------------------------< 240                   45.5                 4.5  | 101  | 1.11                                         Ca 9.3   Mg 2.30  Ph 3.1    Urinalysis Basic - ( 09 Dec 2023 00:28 )    Color: x / Appearance: x / SG: x / pH: x  Gluc: 240 mg/dL / Ketone: x  / Bili: x / Urobili: x   Blood: x / Protein: x / Nitrite: x   Leuk Esterase: x / RBC: x / WBC x   Sq Epi: x / Non Sq Epi: x / Bacteria: x        PT/INR - ( 07 Dec 2023 16:49 )   PT: 12.0 sec;   INR: 1.06 ratio         PTT - ( 09 Dec 2023 00:28 )  PTT:33.0 sec     HPI: 88F with PMHx COPD (no home O2), HTN, TAVR 2019, GERD, hx spinal stenosis s/p fusion 2017 c/b PE (completed AC) presenting with SOB x3 weeks. Cardiology consulted for AF w RVR.     Pt initially presented to ED due to worsening sob, diagnosed w new onset diastolic heart failure. Pt received IV lasix and since then her sob has improved and feels close to her baseline. Earlier tonight, pt noted to develop new onset AF w RVR w rate in the 160s.     ROS as above    T(C): 36.8 (12-08-23 @ 23:50), Max: 36.8 (12-08-23 @ 23:50)  HR: 134 (12-09-23 @ 04:10) (61 - 140)  BP: 92/62 (12-09-23 @ 04:10) (92/62 - 134/71)  RR: 18 (12-09-23 @ 00:50) (18 - 19)  SpO2: 94% (12-09-23 @ 00:50) (94% - 100%)    MEDICATIONS  (STANDING):  amLODIPine   Tablet 5 milliGRAM(s) Oral daily  aspirin enteric coated 81 milliGRAM(s) Oral daily  atorvastatin 20 milliGRAM(s) Oral at bedtime  buPROPion . 75 milliGRAM(s) Oral two times a day  furosemide   Injectable 40 milliGRAM(s) IV Push two times a day  gabapentin 600 milliGRAM(s) Oral two times a day  heparin  Infusion.  Unit(s)/Hr (9 mL/Hr) IV Continuous <Continuous>  influenza  Vaccine (HIGH DOSE) 0.7 milliLiter(s) IntraMuscular once  metoprolol tartrate 25 milliGRAM(s) Oral two times a day  pantoprazole    Tablet 40 milliGRAM(s) Oral before breakfast  PARoxetine 10 milliGRAM(s) Oral daily    MEDICATIONS  (PRN):  acetaminophen     Tablet .. 650 milliGRAM(s) Oral every 6 hours PRN Temp greater or equal to 38C (100.4F), Mild Pain (1 - 3)  albuterol    90 MICROgram(s) HFA Inhaler 2 Puff(s) Inhalation every 6 hours PRN Shortness of Breath and/or Wheezing  heparin   Injectable 2000 Unit(s) IV Push every 6 hours PRN For aPTT between 40 - 57  heparin   Injectable 4000 Unit(s) IV Push every 6 hours PRN For aPTT less than 40  melatonin 3 milliGRAM(s) Oral at bedtime PRN Insomnia  traMADol 50 milliGRAM(s) Oral daily PRN Severe Pain (7 - 10)      I&O's Summary                        14.8                 139  | 24   | 46           14.07 >-----------< 229     ------------------------< 240                   45.5                 4.5  | 101  | 1.11                                         Ca 9.3   Mg 2.30  Ph 3.1    Urinalysis Basic - ( 09 Dec 2023 00:28 )    Color: x / Appearance: x / SG: x / pH: x  Gluc: 240 mg/dL / Ketone: x  / Bili: x / Urobili: x   Blood: x / Protein: x / Nitrite: x   Leuk Esterase: x / RBC: x / WBC x   Sq Epi: x / Non Sq Epi: x / Bacteria: x        PT/INR - ( 07 Dec 2023 16:49 )   PT: 12.0 sec;   INR: 1.06 ratio         PTT - ( 09 Dec 2023 00:28 )  PTT:33.0 sec     HPI: 88F with PMHx COPD (no home O2), HTN, TAVR 2019, GERD, hx spinal stenosis s/p fusion 2017 c/b PE (completed AC) presenting with SOB x3 weeks. Cardiology consulted for AF w RVR.     Pt initially presented to ED due to worsening sob, diagnosed w new onset diastolic heart failure. Pt received IV lasix and since then her sob has improved and feels close to her baseline. Earlier tonight, pt noted to develop new onset AF w RVR w rate in the 160s. Pt received lopressor 5mg x 2 with little improvement. Pt was asymptomatic througout with stable VS    Pt denies any cp, palpitation, sob, orthopnea, PND, dizziness, lightheadedness or syncope. Denies any fever, chills, nausea, vomiting. Denies any sick contacts, recent travel.     ROS as above    T(C): 36.8 (12-08-23 @ 23:50), Max: 36.8 (12-08-23 @ 23:50)  HR: 134 (12-09-23 @ 04:10) (61 - 140)  BP: 92/62 (12-09-23 @ 04:10) (92/62 - 134/71)  RR: 18 (12-09-23 @ 00:50) (18 - 19)  SpO2: 94% (12-09-23 @ 00:50) (94% - 100%)    MEDICATIONS  (STANDING):  amLODIPine   Tablet 5 milliGRAM(s) Oral daily  aspirin enteric coated 81 milliGRAM(s) Oral daily  atorvastatin 20 milliGRAM(s) Oral at bedtime  buPROPion . 75 milliGRAM(s) Oral two times a day  furosemide   Injectable 40 milliGRAM(s) IV Push two times a day  gabapentin 600 milliGRAM(s) Oral two times a day  heparin  Infusion.  Unit(s)/Hr (9 mL/Hr) IV Continuous <Continuous>  influenza  Vaccine (HIGH DOSE) 0.7 milliLiter(s) IntraMuscular once  metoprolol tartrate 25 milliGRAM(s) Oral two times a day  pantoprazole    Tablet 40 milliGRAM(s) Oral before breakfast  PARoxetine 10 milliGRAM(s) Oral daily    MEDICATIONS  (PRN):  acetaminophen     Tablet .. 650 milliGRAM(s) Oral every 6 hours PRN Temp greater or equal to 38C (100.4F), Mild Pain (1 - 3)  albuterol    90 MICROgram(s) HFA Inhaler 2 Puff(s) Inhalation every 6 hours PRN Shortness of Breath and/or Wheezing  heparin   Injectable 2000 Unit(s) IV Push every 6 hours PRN For aPTT between 40 - 57  heparin   Injectable 4000 Unit(s) IV Push every 6 hours PRN For aPTT less than 40  melatonin 3 milliGRAM(s) Oral at bedtime PRN Insomnia  traMADol 50 milliGRAM(s) Oral daily PRN Severe Pain (7 - 10)      I&O's Summary                        14.8                 139  | 24   | 46           14.07 >-----------< 229     ------------------------< 240                   45.5                 4.5  | 101  | 1.11                                         Ca 9.3   Mg 2.30  Ph 3.1    Urinalysis Basic - ( 09 Dec 2023 00:28 )    Color: x / Appearance: x / SG: x / pH: x  Gluc: 240 mg/dL / Ketone: x  / Bili: x / Urobili: x   Blood: x / Protein: x / Nitrite: x   Leuk Esterase: x / RBC: x / WBC x   Sq Epi: x / Non Sq Epi: x / Bacteria: x        PT/INR - ( 07 Dec 2023 16:49 )   PT: 12.0 sec;   INR: 1.06 ratio         PTT - ( 09 Dec 2023 00:28 )  PTT:33.0 sec    < from: TTE W or WO Ultrasound Enhancing Agent (12.08.23 @ 14:26) >  Procedure:         Transthoracic echocardiogram with 2-D, M-mode and complete     spectral and color flow Doppler.  Ordering Location: [facility]  Study Information: Image quality for this study is adequate.    _______________________________________________________________________________________     CONCLUSIONS:      1. Left ventricular cavity is normal. Left ventricular systolic function is normal with an ejection fraction of 55 % by King's method of disks.   2. Normal right ventricular cavity size, wall thickness, and systolic function.   3. Aortic valve was not well visualized.   4. Moderate mitral valve stenosis. There is reduced leaflet mobility of the mitral valve. There is severe calcification of the mitral valve annulus. There is moderate leaflet calcification. The transmitral peak gradient is 22.8 mmHg and mean gradient is 7.79 mmHg. There is severe mitral regurgitation.   5. Severe mitral regurgitation.    < end of copied text >   HPI: 88F with PMHx COPD (no home O2), HTN, TAVR 2019, GERD, hx spinal stenosis s/p fusion 2017 c/b PE (completed AC) presenting with SOB x3 weeks. Cardiology consulted for AF w RVR.     Pt initially presented to ED due to worsening sob, diagnosed w new onset diastolic heart failure. Pt received IV lasix and since then her sob has improved and feels close to her baseline. Earlier tonight, pt noted to develop new onset AF w RVR w rate in the 160s. Pt received lopressor 5mg x 2 with little improvement. Pt was asymptomatic througout with stable VS    Pt denies any cp, palpitation, sob, orthopnea, PND, dizziness, lightheadedness or syncope. Denies any fever, chills, nausea, vomiting. Denies any sick contacts, recent travel.     ROS as above    T(C): 36.8 (12-08-23 @ 23:50), Max: 36.8 (12-08-23 @ 23:50)  HR: 134 (12-09-23 @ 04:10) (61 - 140)  BP: 92/62 (12-09-23 @ 04:10) (92/62 - 134/71)  RR: 18 (12-09-23 @ 00:50) (18 - 19)  SpO2: 94% (12-09-23 @ 00:50) (94% - 100%)    MEDICATIONS  (STANDING):  amLODIPine   Tablet 5 milliGRAM(s) Oral daily  aspirin enteric coated 81 milliGRAM(s) Oral daily  atorvastatin 20 milliGRAM(s) Oral at bedtime  buPROPion . 75 milliGRAM(s) Oral two times a day  furosemide   Injectable 40 milliGRAM(s) IV Push two times a day  gabapentin 600 milliGRAM(s) Oral two times a day  heparin  Infusion.  Unit(s)/Hr (9 mL/Hr) IV Continuous <Continuous>  influenza  Vaccine (HIGH DOSE) 0.7 milliLiter(s) IntraMuscular once  metoprolol tartrate 25 milliGRAM(s) Oral two times a day  pantoprazole    Tablet 40 milliGRAM(s) Oral before breakfast  PARoxetine 10 milliGRAM(s) Oral daily    MEDICATIONS  (PRN):  acetaminophen     Tablet .. 650 milliGRAM(s) Oral every 6 hours PRN Temp greater or equal to 38C (100.4F), Mild Pain (1 - 3)  albuterol    90 MICROgram(s) HFA Inhaler 2 Puff(s) Inhalation every 6 hours PRN Shortness of Breath and/or Wheezing  heparin   Injectable 2000 Unit(s) IV Push every 6 hours PRN For aPTT between 40 - 57  heparin   Injectable 4000 Unit(s) IV Push every 6 hours PRN For aPTT less than 40  melatonin 3 milliGRAM(s) Oral at bedtime PRN Insomnia  traMADol 50 milliGRAM(s) Oral daily PRN Severe Pain (7 - 10)      I&O's Summary                        14.8                 139  | 24   | 46           14.07 >-----------< 229     ------------------------< 240                   45.5                 4.5  | 101  | 1.11                                         Ca 9.3   Mg 2.30  Ph 3.1    Urinalysis Basic - ( 09 Dec 2023 00:28 )    Color: x / Appearance: x / SG: x / pH: x  Gluc: 240 mg/dL / Ketone: x  / Bili: x / Urobili: x   Blood: x / Protein: x / Nitrite: x   Leuk Esterase: x / RBC: x / WBC x   Sq Epi: x / Non Sq Epi: x / Bacteria: x        PT/INR - ( 07 Dec 2023 16:49 )   PT: 12.0 sec;   INR: 1.06 ratio         PTT - ( 09 Dec 2023 00:28 )  PTT:33.0 sec    < from: TTE W or WO Ultrasound Enhancing Agent (12.08.23 @ 14:26) >  Procedure:         Transthoracic echocardiogram with 2-D, M-mode and complete     spectral and color flow Doppler.  Ordering Location: [facility]  Study Information: Image quality for this study is adequate.    _______________________________________________________________________________________     CONCLUSIONS:      1. Left ventricular cavity is normal. Left ventricular systolic function is normal with an ejection fraction of 55 % by King's method of disks.   2. Normal right ventricular cavity size, wall thickness, and systolic function.   3. Aortic valve was not well visualized.   4. Moderate mitral valve stenosis. There is reduced leaflet mobility of the mitral valve. There is severe calcification of the mitral valve annulus. There is moderate leaflet calcification. The transmitral peak gradient is 22.8 mmHg and mean gradient is 7.79 mmHg. There is severe mitral regurgitation.   5. Severe mitral regurgitation.    < end of copied text >

## 2023-12-09 NOTE — CHART NOTE - NSCHARTNOTEFT_GEN_A_CORE
Notified by RN that patient's bedtime fingerstick 70 and repeat noted be 67. Hypoglycemic protocol initiated. Patient is asymptomatic. Repeat fingerstick intoed to be 118. Will continue to monitor.

## 2023-12-09 NOTE — CONSULT NOTE ADULT - ASSESSMENT
HPI: 88F with PMHx COPD (no home O2), HTN, TAVR 2019, GERD, hx spinal stenosis s/p fusion 2017 c/b PE (completed AC) presenting with SOB x3 weeks. Cardiology consulted for AF w RVR.     Pt initially presented to ED due to worsening sob, diagnosed w new onset diastolic heart failure. Pt received IV lasix and since then her sob has improved and feels close to her baseline. Earlier tonight, pt noted to develop new onset AF w RVR w rate in the 160s. Pt received lopressor 5mg x 2 with little improvement. Pt was asymptomatic througout with stable VS    Assessment and Plan:  # New onset AF w RVR  Pt currently asymptomatic. VSS. EKG w AF w RVR and no ischemic changes. TTE on 12/8/23 w preserved EF w no WMA. moderate MS and severe MR. No PE on CTA. Not on AC currently.   Pt received cardizem IV 10mg x 1 and NS 250cc x 1 with some improvement.   CHADVASC atleast 6 (Female, Age, HTN, CHF, DM)  - Switch Toprol to metoprolol tartarate 25mg BID and titrate up as tolerated to HR goal <120s  - Hold off on diuretics for now. Pt maybe overdiuresed.  - Continue heparin GTT and eventually switch to Eliquis 5mg BID  - Replete lytes to keep K>4, Mg>2  - Check TSH    Case discussed w fellow Dr Barbour  Cardiology will continue to follow  Discussed w pt and she understood and agree w plan. She is looking forward to discharge and wishes to f/u w her own cardiologist.

## 2023-12-09 NOTE — CONSULT NOTE ADULT - NS ATTEND AMEND GEN_ALL_CORE FT
Patient seen and examined during morning rounds.  Assessment and recommendations were reviewed with the CCU NP team, and as outlined above.  Continue anticoagulation, rate control, and diuresis. Patient seen and examined during morning rounds.  Assessment and recommendations were reviewed with the CCU NP team, and as outlined above.  Continue anticoagulation, rate control, and diuresis.  As BPs are borderline now, can start IV digoxin load now also.

## 2023-12-09 NOTE — PROVIDER CONTACT NOTE (HYPOGLYCEMIA EVENT) - NS PROVIDER CONTACT BACKGROUND-HYPO
Age: 88y    Gender: Female    POCT Blood Glucose:  119 mg/dL (12-09-23 @ 22:33)  67 mg/dL (12-09-23 @ 21:51)  70 mg/dL (12-09-23 @ 21:47)  254 mg/dL (12-09-23 @ 18:03)      eMAR:atorvastatin   20 milliGRAM(s) Oral (12-09-23 @ 21:56)    insulin glargine Injectable (LANTUS)   10 Unit(s) SubCutaneous (12-09-23 @ 23:15)    insulin lispro (ADMELOG) corrective regimen sliding scale   3 Unit(s) SubCutaneous (12-09-23 @ 18:23)

## 2023-12-09 NOTE — PROGRESS NOTE ADULT - SUBJECTIVE AND OBJECTIVE BOX
Dr. Bella Roberts  Pager [phone]    PROGRESS NOTE:     Patient is a 88y old  Female who presents with a chief complaint of SOB x3 weeks (09 Dec 2023 05:27)      SUBJECTIVE / OVERNIGHT EVENTS: denies chest pain or sob   ADDITIONAL REVIEW OF SYSTEMS: afebrile , on tele rapid afib hr 120-140's    MEDICATIONS  (STANDING):  aspirin enteric coated 81 milliGRAM(s) Oral daily  atorvastatin 20 milliGRAM(s) Oral at bedtime  buPROPion . 75 milliGRAM(s) Oral two times a day  digoxin  Injectable 250 MICROGram(s) IV Push every 6 hours  gabapentin 600 milliGRAM(s) Oral two times a day  heparin  Infusion.  Unit(s)/Hr (9 mL/Hr) IV Continuous <Continuous>  influenza  Vaccine (HIGH DOSE) 0.7 milliLiter(s) IntraMuscular once  metoprolol tartrate 50 milliGRAM(s) Oral every 12 hours  pantoprazole    Tablet 40 milliGRAM(s) Oral before breakfast  PARoxetine 10 milliGRAM(s) Oral daily    MEDICATIONS  (PRN):  acetaminophen     Tablet .. 650 milliGRAM(s) Oral every 6 hours PRN Temp greater or equal to 38C (100.4F), Mild Pain (1 - 3)  albuterol    90 MICROgram(s) HFA Inhaler 2 Puff(s) Inhalation every 6 hours PRN Shortness of Breath and/or Wheezing  heparin   Injectable 4000 Unit(s) IV Push every 6 hours PRN For aPTT less than 40  heparin   Injectable 2000 Unit(s) IV Push every 6 hours PRN For aPTT between 40 - 57  melatonin 3 milliGRAM(s) Oral at bedtime PRN Insomnia  traMADol 50 milliGRAM(s) Oral daily PRN Severe Pain (7 - 10)      CAPILLARY BLOOD GLUCOSE        I&O's Summary      PHYSICAL EXAM:  Vital Signs Last 24 Hrs  T(C): 36.9 (09 Dec 2023 11:07), Max: 36.9 (09 Dec 2023 01:45)  T(F): 98.4 (09 Dec 2023 11:07), Max: 98.5 (09 Dec 2023 01:45)  HR: 142 (09 Dec 2023 11:07) (61 - 145)  BP: 102/78 (09 Dec 2023 11:07) (92/62 - 134/71)  BP(mean): --  RR: 16 (09 Dec 2023 11:07) (16 - 18)  SpO2: 96% (09 Dec 2023 11:07) (94% - 99%)    Parameters below as of 09 Dec 2023 11:07  Patient On (Oxygen Delivery Method): room air      CONSTITUTIONAL: NAD, thin frail woman  RESPIRATORY: Normal respiratory effort; lungs are clear to auscultation bilaterally  CARDIOVASCULAR: irregularly irregular, tachy, click, JOSE; No lower extremity edema; Peripheral pulses are 2+ bilaterally  ABDOMEN: Nontender to palpation, normoactive bowel sounds, no rebound/guarding; No hepatosplenomegaly  MUSCULOSKELETAL: no clubbing or cyanosis of digits; no joint swelling or tenderness to palpation  PSYCH: A+O to person, place, and time; affect appropriate    LABS:                        14.0   11.90 )-----------( 245      ( 09 Dec 2023 09:29 )             43.2     12-09    138  |  101  |  46<H>  ----------------------------<  215<H>  4.1   |  23  |  1.08    Ca    8.8      09 Dec 2023 05:51  Phos  3.1     12-09  Mg     2.30     12-09    TPro  6.5  /  Alb  3.7  /  TBili  0.9  /  DBili  x   /  AST  24  /  ALT  42<H>  /  AlkPhos  90  12-08    PT/INR - ( 07 Dec 2023 16:49 )   PT: 12.0 sec;   INR: 1.06 ratio         PTT - ( 09 Dec 2023 09:29 )  PTT:53.2 sec      Urinalysis Basic - ( 09 Dec 2023 05:51 )    Color: x / Appearance: x / SG: x / pH: x  Gluc: 215 mg/dL / Ketone: x  / Bili: x / Urobili: x   Blood: x / Protein: x / Nitrite: x   Leuk Esterase: x / RBC: x / WBC x   Sq Epi: x / Non Sq Epi: x / Bacteria: x        Culture - Blood (collected 07 Dec 2023 16:47)  Source: .Blood Blood-Venous  Preliminary Report (08 Dec 2023 22:02):    No growth at 24 hours    Culture - Blood (collected 07 Dec 2023 16:30)  Source: .Blood Blood-Peripheral  Preliminary Report (08 Dec 2023 22:02):    No growth at 24 hours        RADIOLOGY & ADDITIONAL TESTS:  Results Reviewed:   Imaging Personally Reviewed:  < from: CT Angio Chest PE Protocol w/ IV Cont (12.07.23 @ 23:23) >  1. No acute pulmonary embolus.  2. Small bilateral pleural effusions.      < from: TTE W or WO Ultrasound Enhancing Agent (12.08.23 @ 14:26) >      1. Left ventricular cavity is normal. Left ventricular systolic function is normal with an ejection fraction of 55 % by King's method of disks.   2. Normal right ventricular cavity size, wall thickness, and systolic function.   3. Aortic valve was not well visualized.   4. Moderate mitral valve stenosis. There is reduced leaflet mobility of the mitral valve. There is severe calcification of the mitral valve annulus. There is moderate leaflet calcification. The transmitral peak gradient is 22.8 mmHg and mean gradient is 7.79 mmHg. There is severe mitral regurgitation.   5. Severe mitral regurgitation.      Electrocardiogram Personally Reviewed:    COORDINATION OF CARE:  Care Discussed with Consultants/Other Providers [Y/N]: cardiology Dr. Gonzalez, digoxin load, as bp is soft   Prior or Outpatient Records Reviewed [Y/N]:   Dr. Bella Roberts  Pager [phone]    PROGRESS NOTE:     Patient is a 88y old  Female who presents with a chief complaint of SOB x3 weeks (09 Dec 2023 05:27)      SUBJECTIVE / OVERNIGHT EVENTS: denies chest pain or sob   ADDITIONAL REVIEW OF SYSTEMS: afebrile , on tele rapid afib hr 120-140's    MEDICATIONS  (STANDING):  aspirin enteric coated 81 milliGRAM(s) Oral daily  atorvastatin 20 milliGRAM(s) Oral at bedtime  buPROPion . 75 milliGRAM(s) Oral two times a day  digoxin  Injectable 250 MICROGram(s) IV Push every 6 hours  gabapentin 600 milliGRAM(s) Oral two times a day  heparin  Infusion.  Unit(s)/Hr (9 mL/Hr) IV Continuous <Continuous>  influenza  Vaccine (HIGH DOSE) 0.7 milliLiter(s) IntraMuscular once  metoprolol tartrate 50 milliGRAM(s) Oral every 12 hours  pantoprazole    Tablet 40 milliGRAM(s) Oral before breakfast  PARoxetine 10 milliGRAM(s) Oral daily    MEDICATIONS  (PRN):  acetaminophen     Tablet .. 650 milliGRAM(s) Oral every 6 hours PRN Temp greater or equal to 38C (100.4F), Mild Pain (1 - 3)  albuterol    90 MICROgram(s) HFA Inhaler 2 Puff(s) Inhalation every 6 hours PRN Shortness of Breath and/or Wheezing  heparin   Injectable 4000 Unit(s) IV Push every 6 hours PRN For aPTT less than 40  heparin   Injectable 2000 Unit(s) IV Push every 6 hours PRN For aPTT between 40 - 57  melatonin 3 milliGRAM(s) Oral at bedtime PRN Insomnia  traMADol 50 milliGRAM(s) Oral daily PRN Severe Pain (7 - 10)      CAPILLARY BLOOD GLUCOSE        I&O's Summary      PHYSICAL EXAM:  Vital Signs Last 24 Hrs  T(C): 36.9 (09 Dec 2023 11:07), Max: 36.9 (09 Dec 2023 01:45)  T(F): 98.4 (09 Dec 2023 11:07), Max: 98.5 (09 Dec 2023 01:45)  HR: 142 (09 Dec 2023 11:07) (61 - 145)  BP: 102/78 (09 Dec 2023 11:07) (92/62 - 134/71)  BP(mean): --  RR: 16 (09 Dec 2023 11:07) (16 - 18)  SpO2: 96% (09 Dec 2023 11:07) (94% - 99%)    Parameters below as of 09 Dec 2023 11:07  Patient On (Oxygen Delivery Method): room air      CONSTITUTIONAL: NAD, thin frail woman  RESPIRATORY: Normal respiratory effort; lungs are clear to auscultation bilaterally  CARDIOVASCULAR: irregularly irregular, tachy, click, JOSE; No lower extremity edema; Peripheral pulses are 2+ bilaterally  ABDOMEN: Nontender to palpation, normoactive bowel sounds, no rebound/guarding; No hepatosplenomegaly  MUSCULOSKELETAL: no clubbing or cyanosis of digits; no joint swelling or tenderness to palpation  PSYCH: A+O to person, place, and time; affect appropriate    LABS:                        14.0   11.90 )-----------( 245      ( 09 Dec 2023 09:29 )             43.2     12-09    138  |  101  |  46<H>  ----------------------------<  215<H>  4.1   |  23  |  1.08    Ca    8.8      09 Dec 2023 05:51  Phos  3.1     12-09  Mg     2.30     12-09    TPro  6.5  /  Alb  3.7  /  TBili  0.9  /  DBili  x   /  AST  24  /  ALT  42<H>  /  AlkPhos  90  12-08    PT/INR - ( 07 Dec 2023 16:49 )   PT: 12.0 sec;   INR: 1.06 ratio         PTT - ( 09 Dec 2023 09:29 )  PTT:53.2 sec      Urinalysis Basic - ( 09 Dec 2023 05:51 )    Color: x / Appearance: x / SG: x / pH: x  Gluc: 215 mg/dL / Ketone: x  / Bili: x / Urobili: x   Blood: x / Protein: x / Nitrite: x   Leuk Esterase: x / RBC: x / WBC x   Sq Epi: x / Non Sq Epi: x / Bacteria: x        Culture - Blood (collected 07 Dec 2023 16:47)  Source: .Blood Blood-Venous  Preliminary Report (08 Dec 2023 22:02):    No growth at 24 hours    Culture - Blood (collected 07 Dec 2023 16:30)  Source: .Blood Blood-Peripheral  Preliminary Report (08 Dec 2023 22:02):    No growth at 24 hours        RADIOLOGY & ADDITIONAL TESTS:  Results Reviewed:   Imaging Personally Reviewed:  < from: CT Angio Chest PE Protocol w/ IV Cont (12.07.23 @ 23:23) >  1. No acute pulmonary embolus.  2. Small bilateral pleural effusions.      < from: TTE W or WO Ultrasound Enhancing Agent (12.08.23 @ 14:26) >      1. Left ventricular cavity is normal. Left ventricular systolic function is normal with an ejection fraction of 55 % by King's method of disks.   2. Normal right ventricular cavity size, wall thickness, and systolic function.   3. Aortic valve was not well visualized.   4. Moderate mitral valve stenosis. There is reduced leaflet mobility of the mitral valve. There is severe calcification of the mitral valve annulus. There is moderate leaflet calcification. The transmitral peak gradient is 22.8 mmHg and mean gradient is 7.79 mmHg. There is severe mitral regurgitation.   5. Severe mitral regurgitation.      Electrocardiogram Personally Reviewed:    COORDINATION OF CARE:  Care Discussed with Consultants/Other Providers [Y/N]: cardiology Dr. Gonzalez, digoxin load, as bp is soft   Prior or Outpatient Records Reviewed [Y/N]:

## 2023-12-10 ENCOUNTER — TRANSCRIPTION ENCOUNTER (OUTPATIENT)
Age: 88
End: 2023-12-10

## 2023-12-10 LAB
ANION GAP SERPL CALC-SCNC: 11 MMOL/L — SIGNIFICANT CHANGE UP (ref 7–14)
ANION GAP SERPL CALC-SCNC: 11 MMOL/L — SIGNIFICANT CHANGE UP (ref 7–14)
APTT BLD: 112.3 SEC — HIGH (ref 24.5–35.6)
APTT BLD: 112.3 SEC — HIGH (ref 24.5–35.6)
APTT BLD: 136.8 SEC — CRITICAL HIGH (ref 24.5–35.6)
APTT BLD: 136.8 SEC — CRITICAL HIGH (ref 24.5–35.6)
APTT BLD: 186.2 SEC — CRITICAL HIGH (ref 24.5–35.6)
APTT BLD: 186.2 SEC — CRITICAL HIGH (ref 24.5–35.6)
BUN SERPL-MCNC: 42 MG/DL — HIGH (ref 7–23)
BUN SERPL-MCNC: 42 MG/DL — HIGH (ref 7–23)
CALCIUM SERPL-MCNC: 8.8 MG/DL — SIGNIFICANT CHANGE UP (ref 8.4–10.5)
CALCIUM SERPL-MCNC: 8.8 MG/DL — SIGNIFICANT CHANGE UP (ref 8.4–10.5)
CHLORIDE SERPL-SCNC: 106 MMOL/L — SIGNIFICANT CHANGE UP (ref 98–107)
CHLORIDE SERPL-SCNC: 106 MMOL/L — SIGNIFICANT CHANGE UP (ref 98–107)
CO2 SERPL-SCNC: 24 MMOL/L — SIGNIFICANT CHANGE UP (ref 22–31)
CO2 SERPL-SCNC: 24 MMOL/L — SIGNIFICANT CHANGE UP (ref 22–31)
CREAT SERPL-MCNC: 0.87 MG/DL — SIGNIFICANT CHANGE UP (ref 0.5–1.3)
CREAT SERPL-MCNC: 0.87 MG/DL — SIGNIFICANT CHANGE UP (ref 0.5–1.3)
EGFR: 64 ML/MIN/1.73M2 — SIGNIFICANT CHANGE UP
EGFR: 64 ML/MIN/1.73M2 — SIGNIFICANT CHANGE UP
GLUCOSE BLDC GLUCOMTR-MCNC: 121 MG/DL — HIGH (ref 70–99)
GLUCOSE BLDC GLUCOMTR-MCNC: 121 MG/DL — HIGH (ref 70–99)
GLUCOSE BLDC GLUCOMTR-MCNC: 173 MG/DL — HIGH (ref 70–99)
GLUCOSE BLDC GLUCOMTR-MCNC: 173 MG/DL — HIGH (ref 70–99)
GLUCOSE BLDC GLUCOMTR-MCNC: 219 MG/DL — HIGH (ref 70–99)
GLUCOSE BLDC GLUCOMTR-MCNC: 219 MG/DL — HIGH (ref 70–99)
GLUCOSE BLDC GLUCOMTR-MCNC: 226 MG/DL — HIGH (ref 70–99)
GLUCOSE BLDC GLUCOMTR-MCNC: 226 MG/DL — HIGH (ref 70–99)
GLUCOSE SERPL-MCNC: 164 MG/DL — HIGH (ref 70–99)
GLUCOSE SERPL-MCNC: 164 MG/DL — HIGH (ref 70–99)
HCT VFR BLD CALC: 43.8 % — SIGNIFICANT CHANGE UP (ref 34.5–45)
HCT VFR BLD CALC: 43.8 % — SIGNIFICANT CHANGE UP (ref 34.5–45)
HCT VFR BLD CALC: 46.3 % — HIGH (ref 34.5–45)
HCT VFR BLD CALC: 46.3 % — HIGH (ref 34.5–45)
HGB BLD-MCNC: 14.3 G/DL — SIGNIFICANT CHANGE UP (ref 11.5–15.5)
HGB BLD-MCNC: 14.3 G/DL — SIGNIFICANT CHANGE UP (ref 11.5–15.5)
HGB BLD-MCNC: 15.1 G/DL — SIGNIFICANT CHANGE UP (ref 11.5–15.5)
HGB BLD-MCNC: 15.1 G/DL — SIGNIFICANT CHANGE UP (ref 11.5–15.5)
MAGNESIUM SERPL-MCNC: 2.3 MG/DL — SIGNIFICANT CHANGE UP (ref 1.6–2.6)
MAGNESIUM SERPL-MCNC: 2.3 MG/DL — SIGNIFICANT CHANGE UP (ref 1.6–2.6)
MCHC RBC-ENTMCNC: 29.6 PG — SIGNIFICANT CHANGE UP (ref 27–34)
MCHC RBC-ENTMCNC: 29.6 PG — SIGNIFICANT CHANGE UP (ref 27–34)
MCHC RBC-ENTMCNC: 30 PG — SIGNIFICANT CHANGE UP (ref 27–34)
MCHC RBC-ENTMCNC: 30 PG — SIGNIFICANT CHANGE UP (ref 27–34)
MCHC RBC-ENTMCNC: 32.6 GM/DL — SIGNIFICANT CHANGE UP (ref 32–36)
MCV RBC AUTO: 90.7 FL — SIGNIFICANT CHANGE UP (ref 80–100)
MCV RBC AUTO: 90.7 FL — SIGNIFICANT CHANGE UP (ref 80–100)
MCV RBC AUTO: 91.9 FL — SIGNIFICANT CHANGE UP (ref 80–100)
MCV RBC AUTO: 91.9 FL — SIGNIFICANT CHANGE UP (ref 80–100)
NRBC # BLD: 0 /100 WBCS — SIGNIFICANT CHANGE UP (ref 0–0)
NRBC # FLD: 0 K/UL — SIGNIFICANT CHANGE UP (ref 0–0)
PHOSPHATE SERPL-MCNC: 3.3 MG/DL — SIGNIFICANT CHANGE UP (ref 2.5–4.5)
PHOSPHATE SERPL-MCNC: 3.3 MG/DL — SIGNIFICANT CHANGE UP (ref 2.5–4.5)
PLATELET # BLD AUTO: 220 K/UL — SIGNIFICANT CHANGE UP (ref 150–400)
PLATELET # BLD AUTO: 220 K/UL — SIGNIFICANT CHANGE UP (ref 150–400)
PLATELET # BLD AUTO: 251 K/UL — SIGNIFICANT CHANGE UP (ref 150–400)
PLATELET # BLD AUTO: 251 K/UL — SIGNIFICANT CHANGE UP (ref 150–400)
POTASSIUM SERPL-MCNC: 4.3 MMOL/L — SIGNIFICANT CHANGE UP (ref 3.5–5.3)
POTASSIUM SERPL-MCNC: 4.3 MMOL/L — SIGNIFICANT CHANGE UP (ref 3.5–5.3)
POTASSIUM SERPL-SCNC: 4.3 MMOL/L — SIGNIFICANT CHANGE UP (ref 3.5–5.3)
POTASSIUM SERPL-SCNC: 4.3 MMOL/L — SIGNIFICANT CHANGE UP (ref 3.5–5.3)
RBC # BLD: 4.83 M/UL — SIGNIFICANT CHANGE UP (ref 3.8–5.2)
RBC # BLD: 4.83 M/UL — SIGNIFICANT CHANGE UP (ref 3.8–5.2)
RBC # BLD: 5.04 M/UL — SIGNIFICANT CHANGE UP (ref 3.8–5.2)
RBC # BLD: 5.04 M/UL — SIGNIFICANT CHANGE UP (ref 3.8–5.2)
RBC # FLD: 13.1 % — SIGNIFICANT CHANGE UP (ref 10.3–14.5)
RBC # FLD: 13.1 % — SIGNIFICANT CHANGE UP (ref 10.3–14.5)
RBC # FLD: 13.2 % — SIGNIFICANT CHANGE UP (ref 10.3–14.5)
RBC # FLD: 13.2 % — SIGNIFICANT CHANGE UP (ref 10.3–14.5)
SODIUM SERPL-SCNC: 141 MMOL/L — SIGNIFICANT CHANGE UP (ref 135–145)
SODIUM SERPL-SCNC: 141 MMOL/L — SIGNIFICANT CHANGE UP (ref 135–145)
WBC # BLD: 8.12 K/UL — SIGNIFICANT CHANGE UP (ref 3.8–10.5)
WBC # BLD: 8.12 K/UL — SIGNIFICANT CHANGE UP (ref 3.8–10.5)
WBC # BLD: 9.21 K/UL — SIGNIFICANT CHANGE UP (ref 3.8–10.5)
WBC # BLD: 9.21 K/UL — SIGNIFICANT CHANGE UP (ref 3.8–10.5)
WBC # FLD AUTO: 8.12 K/UL — SIGNIFICANT CHANGE UP (ref 3.8–10.5)
WBC # FLD AUTO: 8.12 K/UL — SIGNIFICANT CHANGE UP (ref 3.8–10.5)
WBC # FLD AUTO: 9.21 K/UL — SIGNIFICANT CHANGE UP (ref 3.8–10.5)
WBC # FLD AUTO: 9.21 K/UL — SIGNIFICANT CHANGE UP (ref 3.8–10.5)

## 2023-12-10 PROCEDURE — 99232 SBSQ HOSP IP/OBS MODERATE 35: CPT

## 2023-12-10 RX ORDER — DIGOXIN 250 MCG
125 TABLET ORAL DAILY
Refills: 0 | Status: DISCONTINUED | OUTPATIENT
Start: 2023-12-10 | End: 2023-12-10

## 2023-12-10 RX ORDER — HEPARIN SODIUM 5000 [USP'U]/ML
4000 INJECTION INTRAVENOUS; SUBCUTANEOUS EVERY 6 HOURS
Refills: 0 | Status: DISCONTINUED | OUTPATIENT
Start: 2023-12-10 | End: 2023-12-11

## 2023-12-10 RX ORDER — HEPARIN SODIUM 5000 [USP'U]/ML
INJECTION INTRAVENOUS; SUBCUTANEOUS
Qty: 25000 | Refills: 0 | Status: DISCONTINUED | OUTPATIENT
Start: 2023-12-10 | End: 2023-12-11

## 2023-12-10 RX ORDER — HEPARIN SODIUM 5000 [USP'U]/ML
2000 INJECTION INTRAVENOUS; SUBCUTANEOUS EVERY 6 HOURS
Refills: 0 | Status: DISCONTINUED | OUTPATIENT
Start: 2023-12-10 | End: 2023-12-11

## 2023-12-10 RX ORDER — FUROSEMIDE 40 MG
40 TABLET ORAL DAILY
Refills: 0 | Status: DISCONTINUED | OUTPATIENT
Start: 2023-12-10 | End: 2023-12-11

## 2023-12-10 RX ORDER — DIGOXIN 250 MCG
125 TABLET ORAL
Refills: 0 | Status: DISCONTINUED | OUTPATIENT
Start: 2023-12-10 | End: 2023-12-11

## 2023-12-10 RX ADMIN — HEPARIN SODIUM 0 UNIT(S)/HR: 5000 INJECTION INTRAVENOUS; SUBCUTANEOUS at 07:32

## 2023-12-10 RX ADMIN — BUPROPION HYDROCHLORIDE 75 MILLIGRAM(S): 150 TABLET, EXTENDED RELEASE ORAL at 05:37

## 2023-12-10 RX ADMIN — Medication 1: at 13:10

## 2023-12-10 RX ADMIN — Medication 2: at 10:11

## 2023-12-10 RX ADMIN — HEPARIN SODIUM 800 UNIT(S)/HR: 5000 INJECTION INTRAVENOUS; SUBCUTANEOUS at 17:56

## 2023-12-10 RX ADMIN — Medication 50 MILLIGRAM(S): at 05:37

## 2023-12-10 RX ADMIN — INSULIN GLARGINE 10 UNIT(S): 100 INJECTION, SOLUTION SUBCUTANEOUS at 22:35

## 2023-12-10 RX ADMIN — Medication 250 MICROGRAM(S): at 00:58

## 2023-12-10 RX ADMIN — BUPROPION HYDROCHLORIDE 75 MILLIGRAM(S): 150 TABLET, EXTENDED RELEASE ORAL at 17:58

## 2023-12-10 RX ADMIN — HEPARIN SODIUM 900 UNIT(S)/HR: 5000 INJECTION INTRAVENOUS; SUBCUTANEOUS at 08:05

## 2023-12-10 RX ADMIN — Medication 40 MILLIGRAM(S): at 13:10

## 2023-12-10 RX ADMIN — HEPARIN SODIUM 0 UNIT(S)/HR: 5000 INJECTION INTRAVENOUS; SUBCUTANEOUS at 05:20

## 2023-12-10 RX ADMIN — HEPARIN SODIUM 900 UNIT(S)/HR: 5000 INJECTION INTRAVENOUS; SUBCUTANEOUS at 09:37

## 2023-12-10 RX ADMIN — ATORVASTATIN CALCIUM 20 MILLIGRAM(S): 80 TABLET, FILM COATED ORAL at 22:35

## 2023-12-10 RX ADMIN — GABAPENTIN 400 MILLIGRAM(S): 400 CAPSULE ORAL at 17:57

## 2023-12-10 RX ADMIN — GABAPENTIN 400 MILLIGRAM(S): 400 CAPSULE ORAL at 05:37

## 2023-12-10 RX ADMIN — HEPARIN SODIUM 0 UNIT(S)/HR: 5000 INJECTION INTRAVENOUS; SUBCUTANEOUS at 16:43

## 2023-12-10 RX ADMIN — HEPARIN SODIUM 800 UNIT(S)/HR: 5000 INJECTION INTRAVENOUS; SUBCUTANEOUS at 20:01

## 2023-12-10 RX ADMIN — Medication 81 MILLIGRAM(S): at 13:10

## 2023-12-10 RX ADMIN — Medication 10 MILLIGRAM(S): at 13:10

## 2023-12-10 RX ADMIN — PANTOPRAZOLE SODIUM 40 MILLIGRAM(S): 20 TABLET, DELAYED RELEASE ORAL at 07:53

## 2023-12-10 NOTE — DISCHARGE NOTE PROVIDER - NSDCFUADDAPPT_GEN_ALL_CORE_FT
APPTS ARE READY TO BE MADE: [X] YES    Best Family or Patient Contact (if needed): Reinaldo [phone]    Additional Information about above appointments (if needed):    1: Cardiology follow up within 2 weeks,   2:   3:     Other comments or requests:    APPTS ARE READY TO BE MADE: [X] YES    Best Family or Patient Contact (if needed): Reinaldo [phone]    Additional Information about above appointments (if needed):    1: Cardiology follow up within 2 weeks,   2:   3:     Other comments or requests:    APPTS ARE READY TO BE MADE: [X] YES    Best Family or Patient Contact (if needed): Reinaldo [phone]    Additional Information about above appointments (if needed):    1: Cardiology follow up within 2 weeks,   Kendall Perdue MD  [facility]  [address], [address]  [place], [place] [id number] [phone]      Other comments or requests:    APPTS ARE READY TO BE MADE: [X] YES    Best Family or Patient Contact (if needed): Reinaldo [phone]    Additional Information about above appointments (if needed):    1: Cardiology follow up within 2 weeks,   Kendall Perdue MD  [facility]  [address]  [place], [place] [address] [phone]      Other comments or requests:    Kendlal Perdue MD  [facility]  [address] [place] [address]  [phone]  Appointment: 12/13/23 9:45AM     Kendall Perdue MD  [facility]  [address] [place] [address]  [phone]  Appointment: 12/13/23 9:45AM     Kendall Perdue MD  [facility]  [address] [place] [address]  [phone]  Appointment: 12/13/23 9:45AM    Pt is karie with Dr. Perdue on 12/13 at 9:45am, [address] location.  Pt is awaiting callback from Dr. Rivera for scheduling.   Kendall Perdue MD  [facility]  [address] [place] [address]  [phone]  Appointment: 12/13/23 9:45AM    Pt is karie with Dr. Perdue on 12/13 at 9:45am, [address].  Pt is awaiting callback from Dr. Rivera for scheduling.   Kendall Perdue MD  [facility]  [address] [place] [address]  [phone]  Appointment: 12/13/23 9:45AM    Pt is karie with Dr. Perdue on 12/13 at 9:45am, [address].  Pt is awaiting callback from Dr. Rivera for scheduling.      Caregiver Reinaldo declined scheduling assistance as he was unwilling to verify Therese's date of birth despite advising our conversation with Therese's spouse. Kendall Perdue MD  [facility]  [address] [place] [address]  [phone]  Appointment: 12/13/23 9:45AM    Pt is karie with Dr. Perdue on 12/13 at 9:45am, [address].  Pt is awaiting callback from Dr. Rivera for scheduling.      Caregiver Reinlado declined scheduling assistance as he was unwilling to verify Therese's date of birth despite advising our conversation with Therese's spouse.

## 2023-12-10 NOTE — PROGRESS NOTE ADULT - PROBLEM SELECTOR PROBLEM 7
GERD (gastroesophageal reflux disease)
History of spinal stenosis
GERD (gastroesophageal reflux disease)

## 2023-12-10 NOTE — DISCHARGE NOTE PROVIDER - NSDCMRMEDTOKEN_GEN_ALL_CORE_FT
amLODIPine 5 mg oral tablet: 1 tab(s) orally once a day  aspirin 81 mg oral tablet: 1 tab(s) orally once a day  atorvastatin 20 mg oral tablet: 1 tab(s) orally once a day  buPROPion 75 mg oral tablet: 1 tab(s) orally 2 times a day  Farxiga 5 mg oral tablet: 1 tab(s) orally once a day  gabapentin 600 mg oral tablet: 1 tab(s) orally 2 times a day  metoprolol succinate 25 mg oral tablet, extended release: 1 tab(s) orally once a day  omeprazole 20 mg oral delayed release capsule: 1 cap(s) orally once a day  PARoxetine 12.5 mg oral tablet, extended release: 1 tab(s) orally once a day  traMADol 50 mg oral tablet: 1 tab(s) orally once a day (at bedtime)   amLODIPine 5 mg oral tablet: 1 tab(s) orally once a day  apixaban 2.5 mg oral tablet: 1 tab(s) orally every 12 hours TEST SCRIPT  aspirin 81 mg oral tablet: 1 tab(s) orally once a day  atorvastatin 20 mg oral tablet: 1 tab(s) orally once a day  buPROPion 75 mg oral tablet: 1 tab(s) orally 2 times a day  Farxiga 5 mg oral tablet: 1 tab(s) orally once a day  gabapentin 600 mg oral tablet: 1 tab(s) orally 2 times a day  metoprolol succinate 25 mg oral tablet, extended release: 1 tab(s) orally once a day  omeprazole 20 mg oral delayed release capsule: 1 cap(s) orally once a day  PARoxetine 12.5 mg oral tablet, extended release: 1 tab(s) orally once a day  traMADol 50 mg oral tablet: 1 tab(s) orally once a day (at bedtime)   albuterol 90 mcg/inh inhalation aerosol: 2 puff(s) inhaled every 6 hours as needed for Shortness of Breath and/or Wheezing  apixaban 2.5 mg oral tablet: 1 tab(s) orally every 12 hours  aspirin 81 mg oral tablet: 1 tab(s) orally once a day  atorvastatin 20 mg oral tablet: 1 tab(s) orally once a day  buPROPion 75 mg oral tablet: 1 tab(s) orally 2 times a day  Farxiga 5 mg oral tablet: 1 tab(s) orally once a day  furosemide 40 mg oral tablet: 1 tab(s) orally once a day  gabapentin 400 mg oral capsule: 1 cap(s) orally 2 times a day  metoprolol succinate 50 mg oral tablet, extended release: 1 tab(s) orally once a day  omeprazole 20 mg oral delayed release capsule: 1 cap(s) orally once a day  PARoxetine 10 mg oral tablet: 1 tab(s) orally once a day  traMADol 50 mg oral tablet: 1 tab(s) orally once a day (at bedtime)   albuterol 90 mcg/inh inhalation aerosol: 2 puff(s) inhaled every 6 hours as needed for Shortness of Breath and/or Wheezing  alcohol swabs: Apply topically to affected area 4 times a day  apixaban 2.5 mg oral tablet: 1 tab(s) orally every 12 hours  aspirin 81 mg oral tablet: 1 tab(s) orally once a day  atorvastatin 20 mg oral tablet: 1 tab(s) orally once a day  buPROPion 75 mg oral tablet: 1 tab(s) orally 2 times a day  Farxiga 5 mg oral tablet: 1 tab(s) orally once a day  furosemide 40 mg oral tablet: 1 tab(s) orally once a day  gabapentin 400 mg oral capsule: 1 cap(s) orally 2 times a day  glucometer (per patient&#x27;s insurance): Test blood sugars four times a day. Dispense #1 glucometer.  lancets: 1 application subcutaneously 4 times a day  metoprolol succinate 50 mg oral tablet, extended release: 1 tab(s) orally once a day  omeprazole 20 mg oral delayed release capsule: 1 cap(s) orally once a day  PARoxetine 10 mg oral tablet: 1 tab(s) orally once a day  test strips (per patient&#x27;s insurance): 1 application subcutaneously 4 times a day. ** Compatible with patient&#x27;s glucometer **  traMADol 50 mg oral tablet: 1 tab(s) orally once a day (at bedtime)

## 2023-12-10 NOTE — DISCHARGE NOTE PROVIDER - NSFOLLOWUPCLINICS_GEN_ALL_ED_FT
Cardiology at [facility]  Cardiology  [address] [place], [place] [address]  Phone: [phone]  Fax:      Cardiology at [facility]  Cardiology  [address] [place], [place] [address]  Phone: [phone]  Fax:

## 2023-12-10 NOTE — PROGRESS NOTE ADULT - PROBLEM SELECTOR PLAN 9
Lovenox 40mg qd  DASH/TLC fluid restriction  PT eval
Heparin gtt  DASH/TLC fluid restriction  f/u PT recs

## 2023-12-10 NOTE — PROGRESS NOTE ADULT - PROBLEM SELECTOR PROBLEM 1
Acute exacerbation of CHF (congestive heart failure)

## 2023-12-10 NOTE — DISCHARGE NOTE PROVIDER - NSDCCPTREATMENT_GEN_ALL_CORE_FT
PRINCIPAL PROCEDURE  Procedure: 2D echo  Findings and Treatment:   < end of copied text >  CONCLUSIONS:      1. Left ventricular cavity is normal. Left ventricular systolic function is normal with an ejection fraction of 55 % by King's method of disks.   2. Normal right ventricular cavity size, wall thickness, and systolic function.   3. Aortic valve was not well visualized.   4. Moderate mitral valve stenosis. There is reduced leaflet mobility of the mitral valve. There is severe calcification of the mitral valve annulus. There is moderate leaflet calcification. The transmitral peak gradient is 22.8 mmHg and mean gradient is 7.79 mmHg. There is severe mitral regurgitation.   5. Severe mitral regurgitation.< from: TTE W or WO Ultrasound Enhancing Agent (12.08.23 @ 14:26) >

## 2023-12-10 NOTE — PROGRESS NOTE ADULT - PROBLEM SELECTOR PLAN 6
Appears to have been provoked in setting of back surgery. No longer on AC. Completed AC in 2018  - CTPA neg for PE (got premedication for contrast allergy)
- c/w PPI
Appears to have been provoked in setting of back surgery. No longer on AC. Completed AC in 2018  - CTPA neg for PE (got premedication for contrast allergy)

## 2023-12-10 NOTE — PROGRESS NOTE ADULT - PROBLEM SELECTOR PLAN 3
No wheezing on exam, no cough. Not consistent with COPD exacerbation. Not on inhalers at home.  No home O2, no OP pulm  - VBG 7.32/52  - CTPA: Bibasilar atelectasis.  Biapical pleural-parenchymal scarring.  - monitor
cardiology consult appreciated, d/w Dr. Carlos VELAZQUEZ atleast 6 (Female, Age, HTN, CHF, DM)   TTE on 12/8/23 w preserved EF 55% w no WMA. moderate MS and severe MR.   TSH wnl 2.19  c/w heparin gtt for AC  BP soft, digoxin load today, then maintenance dose   if remains in Afib, consult EP on Monday to see if she's candidate for cardioversion vs. ablation
cardiology consult appreciated, d/w Dr. Carlos VELAZQUEZ atleast 6 (Female, Age, HTN, CHF, DM)   TTE on 12/8/23 w preserved EF 55% w no WMA. moderate MS and severe MR.   TSH wnl 2.19  c/w heparin gtt for AC  s/p digoxin load 12/9, now rate controlled on po lopressor 50 bid and start digoxin 0.125mg QOD  Remains in Afib, consult EP on Monday to see if she's candidate for cardioversion vs. ablation

## 2023-12-10 NOTE — DISCHARGE NOTE PROVIDER - HOSPITAL COURSE
88F former smoker, from home/co-op with COPD (no home O2, no MDIs, no OP pulm), HTN, mod MR, TAVR 2019, GERD, hx spinal stenosis s/p fusion 2017 c/b PE s/p AC presenting with SOB x3 weeks admitted with SOB due to ADHF       Problem/Plan - 1:  ·  Problem: Acute exacerbation of CHF (congestive heart failure).   ·  Plan: No significant LE edema but with SOB/BROCK, elevated probnp, mild crackles on exam, small bilateral pleural effusions.  CT neg for PE.   Has hx TAVR and severe MR.  2019 normal EF 70%--stage 2 diastolic HF.   - c/w tele  - Diuresed w/ iv lasix 40 bid,  on hold, resume po lasix 40 qd  - wean O2 as tolerated, not on baseline home O2  - rate control for Afib as below, inc metoprolol to 50 mg bid  - f/u cardiology.     Problem/Plan - 2:  ·  Problem: Elevated fasting glucose.   ·  Plan: - A1c 7.3% c./w DM2  - one episode of hypoglycemia  - ADA diet, ISS, lantus 10 U hs.     Problem/Plan - 3:  ·  Problem: Rapid atrial fibrillation.   ·  Plan: cardiology consult appreciated, d/w Dr. Carlos VELAZQUEZ atleast 6 (Female, Age, HTN, CHF, DM)   TTE on 12/8/23 w preserved EF 55% w no WMA. moderate MS and severe MR.   TSH wnl 2.19  c/w heparin gtt for AC  s/p digoxin load 12/9, now rate controlled on po lopressor 50 bid and start digoxin 0.125mg QOD  Remains in Afib, consult EP on Monday to see if she's candidate for cardioversion vs. ablation.     Problem/Plan - 4:  ·  Problem: Chronic obstructive pulmonary disease, unspecified COPD type.   ·  Plan: No wheezing on exam, no cough. Not consistent with COPD exacerbation. Not on inhalers at home.  No home O2, no OP pulm  - VBG 7.32/52  - CTPA: Bibasilar atelectasis.  Biapical pleural-parenchymal scarring. No PE  - monitor.     Problem/Plan - 5:  ·  Problem: History of transcatheter aortic valve replacement (TAVR).   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     Problem/Plan - 6:  ·  Problem: History of pulmonary embolism.   ·  Plan: Appears to have been provoked in setting of back surgery. No longer on AC. Completed AC in 2018  - CTPA neg for PE (got premedication for contrast allergy).     Problem/Plan - 7:  ·  Problem: GERD (gastroesophageal reflux disease).   ·  Plan: - c/w PPI.     Problem/Plan - 8:  ·  Problem: History of spinal stenosis.   ·  Plan: - reduce gabapentin 400mg BID and tramadol 50mg qd PRN.   88F former smoker, from home/co-op with COPD (no home O2, no MDIs, no OP pulm), HTN, mod MR, TAVR 2019, GERD, hx spinal stenosis s/p fusion 2017 c/b PE s/p AC presenting with SOB x3 weeks admitted with SOB due to ADHF and Afib with RVR. echo demonstrated normal EF and moderate mitral stenosis. Patient improved with IV lasix 40mg bid. now back on PO lasix. Her Afib is now rate controlled with metoprolol. She is medically stable for discharge home and follow up with her cardiologist.

## 2023-12-10 NOTE — DISCHARGE NOTE PROVIDER - ATTENDING DISCHARGE PHYSICAL EXAMINATION:
Physical Exam:  GENERAL: no apparent distress  HEAD:  Atraumatic, Normocephalic  EYES: EOMI, PERRLA, conjunctiva and sclera clear b/l  CHEST/LUNG: on RA; Clear to auscultation bilaterally; No wheezing; No crackles  HEART: Regular rate and rhythm; S1/S2 wnl; no obvious murmurs  ABDOMEN: Soft, Nontender, Nondistended; Bowel sounds present  EXTREMITIES:  2+ Peripheral Pulses, trace LE edema

## 2023-12-10 NOTE — PROGRESS NOTE ADULT - PROBLEM SELECTOR PLAN 8
Lovenox 40mg qd  DASH/TLC fluid restriction  PT eval
- reduce gabapentin 400mg BID and tramadol 50mg qd PRN
- reduce gabapentin 400mg BID and tramadol 50mg qd PRN

## 2023-12-10 NOTE — PROGRESS NOTE ADULT - PROBLEM SELECTOR PROBLEM 3
Rapid atrial fibrillation
Chronic obstructive pulmonary disease, unspecified COPD type
Rapid atrial fibrillation

## 2023-12-10 NOTE — PROGRESS NOTE ADULT - ASSESSMENT
88F former smoker, from home/co-op with COPD (no home O2, no MDIs, no OP pulm), HTN, mod MR, TAVR 2019, GERD, hx spinal stenosis s/p fusion 2017 c/b PE s/p AC presenting with SOB x3 weeks admitted with SOB due to ADHF

## 2023-12-10 NOTE — PHYSICAL THERAPY INITIAL EVALUATION ADULT - ADDITIONAL COMMENTS
Pt lives in a co-op apartment with , +13 steps to negotiate, was independent with all functional mobility and ADL performance without use of an assistive device.    Pt left semi-supine in bed, all lines intact, all needs in reach, bed alarm set, in NAD. Heart rate in 140s, RM Torres made aware.

## 2023-12-10 NOTE — PHYSICAL THERAPY INITIAL EVALUATION ADULT - GENERAL OBSERVATIONS, REHAB EVAL
Pt received semi-supine in bed, +IV Heparin, +telemetry, all lines intact, in NAD. Pt agreeable to participate in PT evaluation.

## 2023-12-10 NOTE — PROGRESS NOTE ADULT - PROBLEM SELECTOR PLAN 4
No wheezing on exam, no cough. Not consistent with COPD exacerbation. Not on inhalers at home.  No home O2, no OP pulm  - VBG 7.32/52  - CTPA: Bibasilar atelectasis.  Biapical pleural-parenchymal scarring. No PE  - monitor
No wheezing on exam, no cough. Not consistent with COPD exacerbation. Not on inhalers at home.  No home O2, no OP pulm  - VBG 7.32/52  - CTPA: Bibasilar atelectasis.  Biapical pleural-parenchymal scarring. No PE  - monitor
- f/u TTE

## 2023-12-10 NOTE — PROGRESS NOTE ADULT - PROBLEM SELECTOR PLAN 1
No significant LE edema but with SOB/BROCK, elevated probnp, mild crackles on exam, small bilateral pleural effusions.  CT neg for PE.   Has hx TAVR and severe MR.  2019 normal EF 70%--stage 2 diastolic HF.   - c/w tele  - Diuresed w/ iv lasix 40 bid,  on hold, resume po lasix 40 qd  - wean O2 as tolerated, not on baseline home O2  - rate control for Afib as below, inc metoprolol to 50 mg bid  - f/u cardiology

## 2023-12-10 NOTE — PROGRESS NOTE ADULT - SUBJECTIVE AND OBJECTIVE BOX
Dr. Bella Roberts  Pager [phone]    PROGRESS NOTE:     Patient is a 88y old  Female who presents with a chief complaint of SOB x3 weeks (09 Dec 2023 14:03)      SUBJECTIVE / OVERNIGHT EVENTS: feels better today, breathing better, afebrile  ADDITIONAL REVIEW OF SYSTEMS: tele reviewed, remains in afib hr controlled 80-90s, pvc, 2 sec pause noted    MEDICATIONS  (STANDING):  aspirin enteric coated 81 milliGRAM(s) Oral daily  atorvastatin 20 milliGRAM(s) Oral at bedtime  buPROPion . 75 milliGRAM(s) Oral two times a day  dextrose 5%. 1000 milliLiter(s) (100 mL/Hr) IV Continuous <Continuous>  dextrose 5%. 1000 milliLiter(s) (50 mL/Hr) IV Continuous <Continuous>  dextrose 50% Injectable 25 Gram(s) IV Push once  dextrose 50% Injectable 25 Gram(s) IV Push once  dextrose 50% Injectable 12.5 Gram(s) IV Push once  digoxin     Tablet 125 MICROGram(s) Oral <User Schedule>  furosemide    Tablet 40 milliGRAM(s) Oral daily  gabapentin 400 milliGRAM(s) Oral two times a day  glucagon  Injectable 1 milliGRAM(s) IntraMuscular once  heparin  Infusion.  Unit(s)/Hr (9 mL/Hr) IV Continuous <Continuous>  influenza  Vaccine (HIGH DOSE) 0.7 milliLiter(s) IntraMuscular once  insulin glargine Injectable (LANTUS) 10 Unit(s) SubCutaneous at bedtime  insulin lispro (ADMELOG) corrective regimen sliding scale   SubCutaneous three times a day before meals  metoprolol tartrate 50 milliGRAM(s) Oral every 12 hours  pantoprazole    Tablet 40 milliGRAM(s) Oral before breakfast  PARoxetine 10 milliGRAM(s) Oral daily    MEDICATIONS  (PRN):  acetaminophen     Tablet .. 650 milliGRAM(s) Oral every 6 hours PRN Temp greater or equal to 38C (100.4F), Mild Pain (1 - 3)  albuterol    90 MICROgram(s) HFA Inhaler 2 Puff(s) Inhalation every 6 hours PRN Shortness of Breath and/or Wheezing  dextrose Oral Gel 15 Gram(s) Oral once PRN Blood Glucose LESS THAN 70 milliGRAM(s)/deciliter  heparin   Injectable 4000 Unit(s) IV Push every 6 hours PRN For aPTT less than 40  heparin   Injectable 2000 Unit(s) IV Push every 6 hours PRN For aPTT between 40 - 57  melatonin 3 milliGRAM(s) Oral at bedtime PRN Insomnia  traMADol 50 milliGRAM(s) Oral daily PRN Severe Pain (7 - 10)      CAPILLARY BLOOD GLUCOSE      POCT Blood Glucose.: 173 mg/dL (10 Dec 2023 12:27)  POCT Blood Glucose.: 226 mg/dL (10 Dec 2023 09:57)  POCT Blood Glucose.: 119 mg/dL (09 Dec 2023 22:33)  POCT Blood Glucose.: 67 mg/dL (09 Dec 2023 21:51)  POCT Blood Glucose.: 70 mg/dL (09 Dec 2023 21:47)  POCT Blood Glucose.: 254 mg/dL (09 Dec 2023 18:03)    I&O's Summary      PHYSICAL EXAM:  Vital Signs Last 24 Hrs  T(C): 36.2 (10 Dec 2023 11:16), Max: 37.1 (09 Dec 2023 19:04)  T(F): 97.2 (10 Dec 2023 11:16), Max: 98.7 (09 Dec 2023 19:04)  HR: 84 (10 Dec 2023 11:16) (44 - 132)  BP: 119/73 (10 Dec 2023 11:16) (98/70 - 122/78)  BP(mean): --  RR: 17 (10 Dec 2023 11:16) (16 - 17)  SpO2: 97% (10 Dec 2023 11:16) (95% - 99%)    Parameters below as of 10 Dec 2023 11:16  Patient On (Oxygen Delivery Method): room air    CONSTITUTIONAL: NAD, thin frail woman  RESPIRATORY: Normal respiratory effort; lungs are clear to auscultation bilaterally  CARDIOVASCULAR: irregularly irregular, rate controlled, click, JOSE; No lower extremity edema; Peripheral pulses are 2+ bilaterally  ABDOMEN: Nontender to palpation, normoactive bowel sounds, no rebound/guarding; No hepatosplenomegaly  MUSCULOSKELETAL: no clubbing or cyanosis of digits; no joint swelling or tenderness to palpation  PSYCH: A+O to person, place, and time; affect appropriate    LABS:                        14.3   8.12  )-----------( 220      ( 10 Dec 2023 05:55 )             43.8     12-10    141  |  106  |  42<H>  ----------------------------<  164<H>  4.3   |  24  |  0.87    Ca    8.8      10 Dec 2023 05:55  Phos  3.3     12-10  Mg     2.30     12-10      PTT - ( 10 Dec 2023 05:55 )  PTT:112.3 sec      Urinalysis Basic - ( 10 Dec 2023 05:55 )    Color: x / Appearance: x / SG: x / pH: x  Gluc: 164 mg/dL / Ketone: x  / Bili: x / Urobili: x   Blood: x / Protein: x / Nitrite: x   Leuk Esterase: x / RBC: x / WBC x   Sq Epi: x / Non Sq Epi: x / Bacteria: x        Culture - Blood (collected 07 Dec 2023 16:47)  Source: .Blood Blood-Venous  Preliminary Report (09 Dec 2023 22:02):    No growth at 48 Hours    Culture - Blood (collected 07 Dec 2023 16:30)  Source: .Blood Blood-Peripheral  Preliminary Report (09 Dec 2023 22:02):    No growth at 48 Hours        RADIOLOGY & ADDITIONAL TESTS:  Results Reviewed:   Imaging Personally Reviewed:  < from: CT Angio Chest PE Protocol w/ IV Cont (12.07.23 @ 23:23) >    1. No acute pulmonary embolus.  2. Small bilateral pleural effusions.    Electrocardiogram Personally Reviewed:    COORDINATION OF CARE:  Care Discussed with Consultants/Other Providers [Y/N]:  Prior or Outpatient Records Reviewed [Y/N]:   Dr. Bella Roberts  Pager [phone]    PROGRESS NOTE:     Patient is a 88y old  Female who presents with a chief complaint of SOB x3 weeks (09 Dec 2023 14:03)      SUBJECTIVE / OVERNIGHT EVENTS: feels better today, breathing better, afebrile  ADDITIONAL REVIEW OF SYSTEMS: tele reviewed, remains in afib hr controlled 80-90s, pvc, 2 sec pause noted    MEDICATIONS  (STANDING):  aspirin enteric coated 81 milliGRAM(s) Oral daily  atorvastatin 20 milliGRAM(s) Oral at bedtime  buPROPion . 75 milliGRAM(s) Oral two times a day  dextrose 5%. 1000 milliLiter(s) (100 mL/Hr) IV Continuous <Continuous>  dextrose 5%. 1000 milliLiter(s) (50 mL/Hr) IV Continuous <Continuous>  dextrose 50% Injectable 25 Gram(s) IV Push once  dextrose 50% Injectable 25 Gram(s) IV Push once  dextrose 50% Injectable 12.5 Gram(s) IV Push once  digoxin     Tablet 125 MICROGram(s) Oral <User Schedule>  furosemide    Tablet 40 milliGRAM(s) Oral daily  gabapentin 400 milliGRAM(s) Oral two times a day  glucagon  Injectable 1 milliGRAM(s) IntraMuscular once  heparin  Infusion.  Unit(s)/Hr (9 mL/Hr) IV Continuous <Continuous>  influenza  Vaccine (HIGH DOSE) 0.7 milliLiter(s) IntraMuscular once  insulin glargine Injectable (LANTUS) 10 Unit(s) SubCutaneous at bedtime  insulin lispro (ADMELOG) corrective regimen sliding scale   SubCutaneous three times a day before meals  metoprolol tartrate 50 milliGRAM(s) Oral every 12 hours  pantoprazole    Tablet 40 milliGRAM(s) Oral before breakfast  PARoxetine 10 milliGRAM(s) Oral daily    MEDICATIONS  (PRN):  acetaminophen     Tablet .. 650 milliGRAM(s) Oral every 6 hours PRN Temp greater or equal to 38C (100.4F), Mild Pain (1 - 3)  albuterol    90 MICROgram(s) HFA Inhaler 2 Puff(s) Inhalation every 6 hours PRN Shortness of Breath and/or Wheezing  dextrose Oral Gel 15 Gram(s) Oral once PRN Blood Glucose LESS THAN 70 milliGRAM(s)/deciliter  heparin   Injectable 4000 Unit(s) IV Push every 6 hours PRN For aPTT less than 40  heparin   Injectable 2000 Unit(s) IV Push every 6 hours PRN For aPTT between 40 - 57  melatonin 3 milliGRAM(s) Oral at bedtime PRN Insomnia  traMADol 50 milliGRAM(s) Oral daily PRN Severe Pain (7 - 10)      CAPILLARY BLOOD GLUCOSE      POCT Blood Glucose.: 173 mg/dL (10 Dec 2023 12:27)  POCT Blood Glucose.: 226 mg/dL (10 Dec 2023 09:57)  POCT Blood Glucose.: 119 mg/dL (09 Dec 2023 22:33)  POCT Blood Glucose.: 67 mg/dL (09 Dec 2023 21:51)  POCT Blood Glucose.: 70 mg/dL (09 Dec 2023 21:47)  POCT Blood Glucose.: 254 mg/dL (09 Dec 2023 18:03)    I&O's Summary      PHYSICAL EXAM:  Vital Signs Last 24 Hrs  T(C): 36.2 (10 Dec 2023 11:16), Max: 37.1 (09 Dec 2023 19:04)  T(F): 97.2 (10 Dec 2023 11:16), Max: 98.7 (09 Dec 2023 19:04)  HR: 84 (10 Dec 2023 11:16) (44 - 132)  BP: 119/73 (10 Dec 2023 11:16) (98/70 - 122/78)  BP(mean): --  RR: 17 (10 Dec 2023 11:16) (16 - 17)  SpO2: 97% (10 Dec 2023 11:16) (95% - 99%)    Parameters below as of 10 Dec 2023 11:16  Patient On (Oxygen Delivery Method): room air    CONSTITUTIONAL: NAD, thin frail woman  RESPIRATORY: Normal respiratory effort; lungs are clear to auscultation bilaterally  CARDIOVASCULAR: irregularly irregular, rate controlled, click, JOSE; No lower extremity edema; Peripheral pulses are 2+ bilaterally  ABDOMEN: Nontender to palpation, normoactive bowel sounds, no rebound/guarding; No hepatosplenomegaly  MUSCULOSKELETAL: no clubbing or cyanosis of digits; no joint swelling or tenderness to palpation  PSYCH: A+O to person, place, and time; affect appropriate    LABS:                        14.3   8.12  )-----------( 220      ( 10 Dec 2023 05:55 )             43.8     12-10    141  |  106  |  42<H>  ----------------------------<  164<H>  4.3   |  24  |  0.87    Ca    8.8      10 Dec 2023 05:55  Phos  3.3     12-10  Mg     2.30     12-10      PTT - ( 10 Dec 2023 05:55 )  PTT:112.3 sec      Urinalysis Basic - ( 10 Dec 2023 05:55 )    Color: x / Appearance: x / SG: x / pH: x  Gluc: 164 mg/dL / Ketone: x  / Bili: x / Urobili: x   Blood: x / Protein: x / Nitrite: x   Leuk Esterase: x / RBC: x / WBC x   Sq Epi: x / Non Sq Epi: x / Bacteria: x        Culture - Blood (collected 07 Dec 2023 16:47)  Source: .Blood Blood-Venous  Preliminary Report (09 Dec 2023 22:02):    No growth at 48 Hours    Culture - Blood (collected 07 Dec 2023 16:30)  Source: .Blood Blood-Peripheral  Preliminary Report (09 Dec 2023 22:02):    No growth at 48 Hours        RADIOLOGY & ADDITIONAL TESTS:  Results Reviewed:   Imaging Personally Reviewed:  < from: CT Angio Chest PE Protocol w/ IV Cont (12.07.23 @ 23:23) >    1. No acute pulmonary embolus.  2. Small bilateral pleural effusions.    Electrocardiogram Personally Reviewed:    COORDINATION OF CARE:  Care Discussed with Consultants/Other Providers [Y/N]:  Prior or Outpatient Records Reviewed [Y/N]:

## 2023-12-10 NOTE — PROGRESS NOTE ADULT - PROBLEM SELECTOR PROBLEM 4
Chronic obstructive pulmonary disease, unspecified COPD type
Chronic obstructive pulmonary disease, unspecified COPD type
History of transcatheter aortic valve replacement (TAVR)

## 2023-12-10 NOTE — PHYSICAL THERAPY INITIAL EVALUATION ADULT - LEVEL OF INDEPENDENCE: GAIT, REHAB EVAL
ambulation assessment held secondary to patient tachycardic in 140s upon standing, pt returned to semi-supine position, RM Torres notified.

## 2023-12-10 NOTE — DISCHARGE NOTE PROVIDER - NSDCCPCAREPLAN_GEN_ALL_CORE_FT
PRINCIPAL DISCHARGE DIAGNOSIS  Diagnosis: Acute CHF  Assessment and Plan of Treatment: due to distolic dysfunction iso valvular disease. improved with diuretics  Continue regimen from hospital. Follow heart healthy diet. Monitor weight. If you develop severe lower extremity swelling and shortness of breath please seek medial attention. Follow up with your PCP and cardiologist for further evaluation and managment. Please call to make an appointment.      SECONDARY DISCHARGE DIAGNOSES  Diagnosis: Rapid atrial fibrillation  Assessment and Plan of Treatment: this is likely attritubting to the CHF exacerbation. heart reate is now improved with current medication.   Please continue your medications as directed and follow-up with your primary provider/cardiologist to further manage your care. Monitor for signs/symptoms of uncontrolled atrial fibrillation such as, increased heart rate, palpitations, chest pain, dizziness, or shortness of breath - Return to emergency room if these signs/symptoms are present.

## 2023-12-10 NOTE — PHYSICAL THERAPY INITIAL EVALUATION ADULT - NSPTDISCHREC_GEN_A_CORE
Discharge recommendation pending further functional mobility assessment, please continue to follow PT notes carefully for updates and recommendation once pt is medically stable to begin ambulating/performing gait assessment

## 2023-12-11 ENCOUNTER — TRANSCRIPTION ENCOUNTER (OUTPATIENT)
Age: 88
End: 2023-12-11

## 2023-12-11 VITALS
HEART RATE: 60 BPM | DIASTOLIC BLOOD PRESSURE: 69 MMHG | RESPIRATION RATE: 17 BRPM | OXYGEN SATURATION: 95 % | SYSTOLIC BLOOD PRESSURE: 128 MMHG | TEMPERATURE: 98 F

## 2023-12-11 DIAGNOSIS — E11.9 TYPE 2 DIABETES MELLITUS WITHOUT COMPLICATIONS: ICD-10-CM

## 2023-12-11 LAB
ANION GAP SERPL CALC-SCNC: 14 MMOL/L — SIGNIFICANT CHANGE UP (ref 7–14)
ANION GAP SERPL CALC-SCNC: 14 MMOL/L — SIGNIFICANT CHANGE UP (ref 7–14)
APTT BLD: 73.6 SEC — HIGH (ref 24.5–35.6)
APTT BLD: 73.6 SEC — HIGH (ref 24.5–35.6)
APTT BLD: 88.4 SEC — HIGH (ref 24.5–35.6)
APTT BLD: 88.4 SEC — HIGH (ref 24.5–35.6)
BUN SERPL-MCNC: 36 MG/DL — HIGH (ref 7–23)
BUN SERPL-MCNC: 36 MG/DL — HIGH (ref 7–23)
CALCIUM SERPL-MCNC: 9.1 MG/DL — SIGNIFICANT CHANGE UP (ref 8.4–10.5)
CALCIUM SERPL-MCNC: 9.1 MG/DL — SIGNIFICANT CHANGE UP (ref 8.4–10.5)
CHLORIDE SERPL-SCNC: 105 MMOL/L — SIGNIFICANT CHANGE UP (ref 98–107)
CHLORIDE SERPL-SCNC: 105 MMOL/L — SIGNIFICANT CHANGE UP (ref 98–107)
CO2 SERPL-SCNC: 25 MMOL/L — SIGNIFICANT CHANGE UP (ref 22–31)
CO2 SERPL-SCNC: 25 MMOL/L — SIGNIFICANT CHANGE UP (ref 22–31)
CREAT SERPL-MCNC: 0.92 MG/DL — SIGNIFICANT CHANGE UP (ref 0.5–1.3)
CREAT SERPL-MCNC: 0.92 MG/DL — SIGNIFICANT CHANGE UP (ref 0.5–1.3)
DIGOXIN SERPL-MCNC: 1.8 NG/ML — SIGNIFICANT CHANGE UP (ref 0.8–2)
DIGOXIN SERPL-MCNC: 1.8 NG/ML — SIGNIFICANT CHANGE UP (ref 0.8–2)
EGFR: 60 ML/MIN/1.73M2 — SIGNIFICANT CHANGE UP
EGFR: 60 ML/MIN/1.73M2 — SIGNIFICANT CHANGE UP
GLUCOSE BLDC GLUCOMTR-MCNC: 129 MG/DL — HIGH (ref 70–99)
GLUCOSE BLDC GLUCOMTR-MCNC: 129 MG/DL — HIGH (ref 70–99)
GLUCOSE BLDC GLUCOMTR-MCNC: 146 MG/DL — HIGH (ref 70–99)
GLUCOSE BLDC GLUCOMTR-MCNC: 146 MG/DL — HIGH (ref 70–99)
GLUCOSE BLDC GLUCOMTR-MCNC: 221 MG/DL — HIGH (ref 70–99)
GLUCOSE BLDC GLUCOMTR-MCNC: 221 MG/DL — HIGH (ref 70–99)
GLUCOSE SERPL-MCNC: 165 MG/DL — HIGH (ref 70–99)
GLUCOSE SERPL-MCNC: 165 MG/DL — HIGH (ref 70–99)
HCT VFR BLD CALC: 44.3 % — SIGNIFICANT CHANGE UP (ref 34.5–45)
HCT VFR BLD CALC: 44.3 % — SIGNIFICANT CHANGE UP (ref 34.5–45)
HGB BLD-MCNC: 14.5 G/DL — SIGNIFICANT CHANGE UP (ref 11.5–15.5)
HGB BLD-MCNC: 14.5 G/DL — SIGNIFICANT CHANGE UP (ref 11.5–15.5)
MAGNESIUM SERPL-MCNC: 2.1 MG/DL — SIGNIFICANT CHANGE UP (ref 1.6–2.6)
MAGNESIUM SERPL-MCNC: 2.1 MG/DL — SIGNIFICANT CHANGE UP (ref 1.6–2.6)
MCHC RBC-ENTMCNC: 30 PG — SIGNIFICANT CHANGE UP (ref 27–34)
MCHC RBC-ENTMCNC: 30 PG — SIGNIFICANT CHANGE UP (ref 27–34)
MCHC RBC-ENTMCNC: 32.7 GM/DL — SIGNIFICANT CHANGE UP (ref 32–36)
MCHC RBC-ENTMCNC: 32.7 GM/DL — SIGNIFICANT CHANGE UP (ref 32–36)
MCV RBC AUTO: 91.7 FL — SIGNIFICANT CHANGE UP (ref 80–100)
MCV RBC AUTO: 91.7 FL — SIGNIFICANT CHANGE UP (ref 80–100)
NRBC # BLD: 0 /100 WBCS — SIGNIFICANT CHANGE UP (ref 0–0)
NRBC # BLD: 0 /100 WBCS — SIGNIFICANT CHANGE UP (ref 0–0)
NRBC # FLD: 0 K/UL — SIGNIFICANT CHANGE UP (ref 0–0)
NRBC # FLD: 0 K/UL — SIGNIFICANT CHANGE UP (ref 0–0)
PHOSPHATE SERPL-MCNC: 3.8 MG/DL — SIGNIFICANT CHANGE UP (ref 2.5–4.5)
PHOSPHATE SERPL-MCNC: 3.8 MG/DL — SIGNIFICANT CHANGE UP (ref 2.5–4.5)
PLATELET # BLD AUTO: 217 K/UL — SIGNIFICANT CHANGE UP (ref 150–400)
PLATELET # BLD AUTO: 217 K/UL — SIGNIFICANT CHANGE UP (ref 150–400)
POTASSIUM SERPL-MCNC: 4.2 MMOL/L — SIGNIFICANT CHANGE UP (ref 3.5–5.3)
POTASSIUM SERPL-MCNC: 4.2 MMOL/L — SIGNIFICANT CHANGE UP (ref 3.5–5.3)
POTASSIUM SERPL-SCNC: 4.2 MMOL/L — SIGNIFICANT CHANGE UP (ref 3.5–5.3)
POTASSIUM SERPL-SCNC: 4.2 MMOL/L — SIGNIFICANT CHANGE UP (ref 3.5–5.3)
RBC # BLD: 4.83 M/UL — SIGNIFICANT CHANGE UP (ref 3.8–5.2)
RBC # BLD: 4.83 M/UL — SIGNIFICANT CHANGE UP (ref 3.8–5.2)
RBC # FLD: 13.2 % — SIGNIFICANT CHANGE UP (ref 10.3–14.5)
RBC # FLD: 13.2 % — SIGNIFICANT CHANGE UP (ref 10.3–14.5)
SODIUM SERPL-SCNC: 144 MMOL/L — SIGNIFICANT CHANGE UP (ref 135–145)
SODIUM SERPL-SCNC: 144 MMOL/L — SIGNIFICANT CHANGE UP (ref 135–145)
WBC # BLD: 7.49 K/UL — SIGNIFICANT CHANGE UP (ref 3.8–10.5)
WBC # BLD: 7.49 K/UL — SIGNIFICANT CHANGE UP (ref 3.8–10.5)
WBC # FLD AUTO: 7.49 K/UL — SIGNIFICANT CHANGE UP (ref 3.8–10.5)
WBC # FLD AUTO: 7.49 K/UL — SIGNIFICANT CHANGE UP (ref 3.8–10.5)

## 2023-12-11 PROCEDURE — 99232 SBSQ HOSP IP/OBS MODERATE 35: CPT

## 2023-12-11 PROCEDURE — 93308 TTE F-UP OR LMTD: CPT | Mod: 26

## 2023-12-11 PROCEDURE — 99239 HOSP IP/OBS DSCHRG MGMT >30: CPT

## 2023-12-11 RX ORDER — METOPROLOL TARTRATE 50 MG
50 TABLET ORAL DAILY
Refills: 0 | Status: DISCONTINUED | OUTPATIENT
Start: 2023-12-12 | End: 2023-12-11

## 2023-12-11 RX ORDER — APIXABAN 2.5 MG/1
1 TABLET, FILM COATED ORAL
Qty: 60 | Refills: 0
Start: 2023-12-11 | End: 2024-01-09

## 2023-12-11 RX ORDER — METOPROLOL TARTRATE 50 MG
1 TABLET ORAL
Qty: 30 | Refills: 0
Start: 2023-12-11 | End: 2024-01-09

## 2023-12-11 RX ORDER — ISOPROPYL ALCOHOL, BENZOCAINE .7; .06 ML/ML; ML/ML
0 SWAB TOPICAL
Qty: 100 | Refills: 1
Start: 2023-12-11

## 2023-12-11 RX ORDER — AMLODIPINE BESYLATE 2.5 MG/1
1 TABLET ORAL
Refills: 0 | DISCHARGE

## 2023-12-11 RX ORDER — APIXABAN 2.5 MG/1
2.5 TABLET, FILM COATED ORAL EVERY 12 HOURS
Refills: 0 | Status: DISCONTINUED | OUTPATIENT
Start: 2023-12-11 | End: 2023-12-11

## 2023-12-11 RX ORDER — GABAPENTIN 400 MG/1
1 CAPSULE ORAL
Qty: 60 | Refills: 0
Start: 2023-12-11 | End: 2024-01-09

## 2023-12-11 RX ORDER — GABAPENTIN 400 MG/1
1 CAPSULE ORAL
Refills: 0 | DISCHARGE

## 2023-12-11 RX ORDER — FUROSEMIDE 40 MG
1 TABLET ORAL
Qty: 30 | Refills: 0
Start: 2023-12-11 | End: 2024-01-09

## 2023-12-11 RX ORDER — METOPROLOL TARTRATE 50 MG
1 TABLET ORAL
Refills: 0 | DISCHARGE

## 2023-12-11 RX ORDER — ALBUTEROL 90 UG/1
2 AEROSOL, METERED ORAL
Qty: 1 | Refills: 0
Start: 2023-12-11 | End: 2024-01-09

## 2023-12-11 RX ADMIN — Medication 2: at 13:11

## 2023-12-11 RX ADMIN — Medication 40 MILLIGRAM(S): at 05:59

## 2023-12-11 RX ADMIN — GABAPENTIN 400 MILLIGRAM(S): 400 CAPSULE ORAL at 18:49

## 2023-12-11 RX ADMIN — GABAPENTIN 400 MILLIGRAM(S): 400 CAPSULE ORAL at 06:00

## 2023-12-11 RX ADMIN — HEPARIN SODIUM 800 UNIT(S)/HR: 5000 INJECTION INTRAVENOUS; SUBCUTANEOUS at 08:41

## 2023-12-11 RX ADMIN — Medication 81 MILLIGRAM(S): at 13:11

## 2023-12-11 RX ADMIN — HEPARIN SODIUM 800 UNIT(S)/HR: 5000 INJECTION INTRAVENOUS; SUBCUTANEOUS at 00:42

## 2023-12-11 RX ADMIN — PANTOPRAZOLE SODIUM 40 MILLIGRAM(S): 20 TABLET, DELAYED RELEASE ORAL at 05:59

## 2023-12-11 RX ADMIN — Medication 50 MILLIGRAM(S): at 05:58

## 2023-12-11 RX ADMIN — HEPARIN SODIUM 800 UNIT(S)/HR: 5000 INJECTION INTRAVENOUS; SUBCUTANEOUS at 08:39

## 2023-12-11 RX ADMIN — APIXABAN 2.5 MILLIGRAM(S): 2.5 TABLET, FILM COATED ORAL at 19:01

## 2023-12-11 RX ADMIN — BUPROPION HYDROCHLORIDE 75 MILLIGRAM(S): 150 TABLET, EXTENDED RELEASE ORAL at 05:59

## 2023-12-11 RX ADMIN — Medication 10 MILLIGRAM(S): at 13:11

## 2023-12-11 RX ADMIN — BUPROPION HYDROCHLORIDE 75 MILLIGRAM(S): 150 TABLET, EXTENDED RELEASE ORAL at 18:48

## 2023-12-11 NOTE — DISCHARGE NOTE NURSING/CASE MANAGEMENT/SOCIAL WORK - NSDCFUADDAPPT_GEN_ALL_CORE_FT
Kendall Perdue MD  [facility]  [address] [place] [address]  [phone]  Appointment: 12/13/23 9:45AM    Pt is karie with Dr. Perdue on 12/13 at 9:45am, [address].  Pt is awaiting callback from Dr. Rivera for scheduling.   Kendall Perdue MD  [facility]  [address] [place] [address]  [phone]  Appointment: 12/13/23 9:45AM    Pt is karie with Dr. Perdue on 12/13 at 9:45am, [address].  Pt is awaiting callback from Dr. Rivera for scheduling.

## 2023-12-11 NOTE — DISCHARGE NOTE NURSING/CASE MANAGEMENT/SOCIAL WORK - NSDCPEPT PROEDHF_GEN_ALL_CORE
24-Apr-2018 09:15 Patient tolerated procedure well. Call primary care provider for follow up after discharge/Activities as tolerated/Low salt diet/Monitor weight daily/Report signs and symptoms to primary care provider

## 2023-12-11 NOTE — DISCHARGE NOTE NURSING/CASE MANAGEMENT/SOCIAL WORK - NSDCPEFALRISK_GEN_ALL_CORE
For information on Fall & Injury Prevention, visit: [email or web address] OR  [email or web address] OR  https://www.cdc.gov/steadi/patient.html For information on Fall & Injury Prevention, visit: [email or web address] OR  [email or web address] OR  https://www.cdc.gov/steadi/patient.html

## 2023-12-11 NOTE — PROGRESS NOTE ADULT - ATTENDING COMMENTS
mild-mod MS  relative bradycardia in SR, asymptomatic  can start DOAC  cont bblocker for rate control  euvolemic on exam  repeat MR eval as outpt for possible referral for transcatheter [id number] repair mild-mod MS  relative bradycardia in SR, asymptomatic  can start DOAC  cont bblocker for rate control  euvolemic on exam  repeat MR eval as outpt for possible referral for transcatheter [id number] repair

## 2023-12-11 NOTE — DISCHARGE NOTE NURSING/CASE MANAGEMENT/SOCIAL WORK - PATIENT PORTAL LINK FT
You can access the FollowMyHealth Patient Portal offered by [facility] by registering at the following website: [email or web address]. By joining [employer]’s FollowMyHealth portal, you will also be able to view your health information using other applications (apps) compatible with our system. You can access the FollowMyHealth Patient Portal offered by [facility] by registering at the following website: [email or web address]. By joining [employer]’s FollowMyHealth portal, you will also be able to view your health information using other applications (apps) compatible with our system.

## 2023-12-11 NOTE — PHARMACOTHERAPY INTERVENTION NOTE - COMMENTS
Discharge medications reviewed with the patient. Current medication schedule was discussed in detail including: medication name, indication, dose, administration times, treatment duration, side effects, and special instructions. Educated patient on apixaban including the purpose and administration. Discussed adverse effects in detail and when to seek medical attention (blood in stool, vomit, etc.). Advised to avoid NSAIDs to limit risk of bleeding. Also discussed CHF management. Patient counseled on heart failure medication indications, administration, and side effects. Also discussed fluid and salt restrictions, and maintaining a log of daily weights. Discussed warning signs of fluid overload (SOB, orthopnea, BROCK, edema, etc.) and when to seek medical attention. Patient verbalized understanding. Questions and concerns were answered and addressed. Patient provided with educational medication cards and CHF booklet.    New medications: apixaban, furosemide    Laureen GarnicaD, BCPS  Clinical Pharmacy Specialist  [id number]  Discharge medications reviewed with the patient. Current medication schedule was discussed in detail including: medication name, indication, dose, administration times, treatment duration, side effects, and special instructions. Educated patient on apixaban including the purpose and administration. Discussed adverse effects in detail and when to seek medical attention (blood in stool, vomit, etc.). Advised to avoid NSAIDs to limit risk of bleeding. Also discussed CHF management. Patient counseled on heart failure medication indications, administration, and side effects. Also discussed fluid and salt restrictions, and maintaining a log of daily weights. Discussed warning signs of fluid overload (SOB, orthopnea, BROCK, edema, etc.) and when to seek medical attention. Patient verbalized understanding. Questions and concerns were answered and addressed. Patient provided with educational medication cards and CHF booklet.    New medications: apixaban, furosemide    Laureen GarnicaD, BCPS  Clinical Pharmacy Specialist  [id number]

## 2023-12-11 NOTE — PROGRESS NOTE ADULT - SUBJECTIVE AND OBJECTIVE BOX
Cardiology Progress Note  ------------------------------------------------------------------------------------------    SUBJECTIVE/EVENTS:  - Tele: Sinus rhythm w/ rates in the 40-50s  - NAEO    -------------------------------------------------------------------------------------------  ROS:  CV: chest pain (-), palpitation (-), orthopnea (-), PND (-), edema (-)  PULM: SOB (-), cough (-), wheezing (-), hemoptysis (-).   CONST: fever (-), chills (-) or fatigue (-)  GI: abdominal distension (-), abdominal pain (-) , nausea/vomiting (-), hematemesis, (-), melena (-), hematochezia (-)  : dysuria (-), frequency (-), hematuria (-).   NEURO: numbness (-), weakness (-), dizziness (-)  MSK: myalgia (-), joint pain (-)   SKIN: itching (-), rash (-)  HEENT:  visual changes (-); vertigo or throat pain (-);  neck stiffness (-)   Psych: change in mood (-), anxiety (-), depression (-)     All other review of systems is negative unless indicated above.   -------------------------------------------------------------------------------------------  VITALS:  T(F): 98.7 (12-11), Max: 98.7 (12-11)  HR: 62 (12-11) (57 - 84)  BP: 144/69 (12-11) (119/73 - 144/69)  RR: 18 (12-11)  SpO2: 98% (12-11)  I&O's Summary    10 Dec 2023 07:01  -  11 Dec 2023 07:00  --------------------------------------------------------  IN: 480 mL / OUT: 0 mL / NET: 480 mL      ------------------------------------------------------------------------------------------  PHYSICAL EXAM:  GEN: NAD, sitting in bed  HEENT: NCAT, EOMI  CV: RRR, Ns1/s2, 2/6 systolic murmur over the LLSB, JVP not elevated  RESP: CTA B/l, no w/r/r  ABD: soft, nt, nd  EXT: warm, 2+ pulses, no edema  SKIN: no visible lesions, rashes  NEURO: AAOx3, no focal deficits  PSYCH: Calm, cooperative  -------------------------------------------------------------------------------------------  LABS:                          14.5   7.49  )-----------( 217      ( 11 Dec 2023 06:52 )             44.3     12-11    144  |  105  |  36<H>  ----------------------------<  165<H>  4.2   |  25  |  0.92    Ca    9.1      11 Dec 2023 06:52  Phos  3.8     12-11  Mg     2.10     12-11      PTT - ( 10 Dec 2023 23:42 )  PTT:73.6 sec  CARDIAC MARKERS ( 07 Dec 2023 18:10 )  21 ng/L / x     / x     / x     / x     / x      CARDIAC MARKERS ( 07 Dec 2023 16:49 )  23 ng/L / x     / x     / x     / x     / x                -------------------------------------------------------------------------------------------  Meds:  acetaminophen     Tablet .. 650 milliGRAM(s) Oral every 6 hours PRN  albuterol    90 MICROgram(s) HFA Inhaler 2 Puff(s) Inhalation every 6 hours PRN  aspirin enteric coated 81 milliGRAM(s) Oral daily  atorvastatin 20 milliGRAM(s) Oral at bedtime  buPROPion . 75 milliGRAM(s) Oral two times a day  dextrose 5%. 1000 milliLiter(s) IV Continuous <Continuous>  dextrose 5%. 1000 milliLiter(s) IV Continuous <Continuous>  dextrose 50% Injectable 25 Gram(s) IV Push once  dextrose 50% Injectable 25 Gram(s) IV Push once  dextrose 50% Injectable 12.5 Gram(s) IV Push once  dextrose Oral Gel 15 Gram(s) Oral once PRN  digoxin     Tablet 125 MICROGram(s) Oral <User Schedule>  furosemide    Tablet 40 milliGRAM(s) Oral daily  gabapentin 400 milliGRAM(s) Oral two times a day  glucagon  Injectable 1 milliGRAM(s) IntraMuscular once  heparin   Injectable 4000 Unit(s) IV Push every 6 hours PRN  heparin   Injectable 2000 Unit(s) IV Push every 6 hours PRN  heparin  Infusion.  Unit(s)/Hr IV Continuous <Continuous>  influenza  Vaccine (HIGH DOSE) 0.7 milliLiter(s) IntraMuscular once  insulin glargine Injectable (LANTUS) 10 Unit(s) SubCutaneous at bedtime  insulin lispro (ADMELOG) corrective regimen sliding scale   SubCutaneous three times a day before meals  melatonin 3 milliGRAM(s) Oral at bedtime PRN  metoprolol tartrate 50 milliGRAM(s) Oral every 12 hours  pantoprazole    Tablet 40 milliGRAM(s) Oral before breakfast  PARoxetine 10 milliGRAM(s) Oral daily  traMADol 50 milliGRAM(s) Oral daily PRN    -------------------------------------------------------------------------------------------  Cardiovascular Diagnostic Testing:      -------------------------------------------------------------------------------------------  Assessment and Plan:   Ms. Garcia is a 89yo woman w/ hc of COPD, HTN, TAVR in 2019, GERD, spinal stenosis s/p fusion in 2017 c/b PE s/p AC who presented w/ SOB thought 2/2 to new HFpEF s/p diuresis then found to be in AFib w/ RVR for which cardiology is consulted.    #AFib  #HFpEF  #HTN  #TAVR  Pt s/p diuresis and then went into AFib w/ RVR found to have rates in the 150-160s. S/p metop and dig w/ eventual spontaneous conversion to NSR. [id number] is 6 (Age, Woman, HTN, DM, HF). Pt started on AC. Pt now in NSR.    Recommendations:  - okay to start apixaban 2.5mg BID (dose reduced iso Age and weight <60kg)  - Stop digoxin  - convert metop tartrate 50mg BID to metop succinate 50mg qd given HRs in the 40s  - continue furosemide 40mg qd  - continue home ASA  - please arrange outpt cardiologist within 2w of DC    Note is incomplete    *** Recommendations are preliminary until cosigned by the attending.    Humble Haley MD  Cardiology Fellow    For all New Consults and Questions:  [email or web address]   Login: [facility] Cardiology Progress Note  ------------------------------------------------------------------------------------------    SUBJECTIVE/EVENTS:  - Tele: Sinus rhythm w/ rates in the 40-50s  - NAEO    -------------------------------------------------------------------------------------------  ROS:  CV: chest pain (-), palpitation (-), orthopnea (-), PND (-), edema (-)  PULM: SOB (-), cough (-), wheezing (-), hemoptysis (-).   CONST: fever (-), chills (-) or fatigue (-)  GI: abdominal distension (-), abdominal pain (-) , nausea/vomiting (-), hematemesis, (-), melena (-), hematochezia (-)  : dysuria (-), frequency (-), hematuria (-).   NEURO: numbness (-), weakness (-), dizziness (-)  MSK: myalgia (-), joint pain (-)   SKIN: itching (-), rash (-)  HEENT:  visual changes (-); vertigo or throat pain (-);  neck stiffness (-)   Psych: change in mood (-), anxiety (-), depression (-)     All other review of systems is negative unless indicated above.   -------------------------------------------------------------------------------------------  VITALS:  T(F): 98.7 (12-11), Max: 98.7 (12-11)  HR: 62 (12-11) (57 - 84)  BP: 144/69 (12-11) (119/73 - 144/69)  RR: 18 (12-11)  SpO2: 98% (12-11)  I&O's Summary    10 Dec 2023 07:01  -  11 Dec 2023 07:00  --------------------------------------------------------  IN: 480 mL / OUT: 0 mL / NET: 480 mL      ------------------------------------------------------------------------------------------  PHYSICAL EXAM:  GEN: NAD, sitting in bed  HEENT: NCAT, EOMI  CV: RRR, Ns1/s2, 2/6 systolic murmur over the LLSB, JVP not elevated  RESP: CTA B/l, no w/r/r  ABD: soft, nt, nd  EXT: warm, 2+ pulses, no edema  SKIN: no visible lesions, rashes  NEURO: AAOx3, no focal deficits  PSYCH: Calm, cooperative  -------------------------------------------------------------------------------------------  LABS:                          14.5   7.49  )-----------( 217      ( 11 Dec 2023 06:52 )             44.3     12-11    144  |  105  |  36<H>  ----------------------------<  165<H>  4.2   |  25  |  0.92    Ca    9.1      11 Dec 2023 06:52  Phos  3.8     12-11  Mg     2.10     12-11      PTT - ( 10 Dec 2023 23:42 )  PTT:73.6 sec  CARDIAC MARKERS ( 07 Dec 2023 18:10 )  21 ng/L / x     / x     / x     / x     / x      CARDIAC MARKERS ( 07 Dec 2023 16:49 )  23 ng/L / x     / x     / x     / x     / x                -------------------------------------------------------------------------------------------  Meds:  acetaminophen     Tablet .. 650 milliGRAM(s) Oral every 6 hours PRN  albuterol    90 MICROgram(s) HFA Inhaler 2 Puff(s) Inhalation every 6 hours PRN  aspirin enteric coated 81 milliGRAM(s) Oral daily  atorvastatin 20 milliGRAM(s) Oral at bedtime  buPROPion . 75 milliGRAM(s) Oral two times a day  dextrose 5%. 1000 milliLiter(s) IV Continuous <Continuous>  dextrose 5%. 1000 milliLiter(s) IV Continuous <Continuous>  dextrose 50% Injectable 25 Gram(s) IV Push once  dextrose 50% Injectable 25 Gram(s) IV Push once  dextrose 50% Injectable 12.5 Gram(s) IV Push once  dextrose Oral Gel 15 Gram(s) Oral once PRN  digoxin     Tablet 125 MICROGram(s) Oral <User Schedule>  furosemide    Tablet 40 milliGRAM(s) Oral daily  gabapentin 400 milliGRAM(s) Oral two times a day  glucagon  Injectable 1 milliGRAM(s) IntraMuscular once  heparin   Injectable 4000 Unit(s) IV Push every 6 hours PRN  heparin   Injectable 2000 Unit(s) IV Push every 6 hours PRN  heparin  Infusion.  Unit(s)/Hr IV Continuous <Continuous>  influenza  Vaccine (HIGH DOSE) 0.7 milliLiter(s) IntraMuscular once  insulin glargine Injectable (LANTUS) 10 Unit(s) SubCutaneous at bedtime  insulin lispro (ADMELOG) corrective regimen sliding scale   SubCutaneous three times a day before meals  melatonin 3 milliGRAM(s) Oral at bedtime PRN  metoprolol tartrate 50 milliGRAM(s) Oral every 12 hours  pantoprazole    Tablet 40 milliGRAM(s) Oral before breakfast  PARoxetine 10 milliGRAM(s) Oral daily  traMADol 50 milliGRAM(s) Oral daily PRN    -------------------------------------------------------------------------------------------  Cardiovascular Diagnostic Testing:      -------------------------------------------------------------------------------------------  Assessment and Plan:   Ms. Garcia is a 89yo woman w/ hc of COPD, HTN, TAVR in 2019, GERD, spinal stenosis s/p fusion in 2017 c/b PE s/p AC who presented w/ SOB thought 2/2 to new HFpEF s/p diuresis then found to be in AFib w/ RVR for which cardiology is consulted.    #AFib  #HFpEF  #HTN  #TAVR  Pt s/p diuresis and then went into AFib w/ RVR found to have rates in the 150-160s. S/p metop and dig w/ eventual spontaneous conversion to NSR. [id number] is 6 (Age, Woman, HTN, DM, HF). Pt started on AC. Pt now in NSR.    Recommendations:  - okay to start apixaban 2.5mg BID (dose reduced iso Age and weight <60kg)  - Stop digoxin  - convert metop tartrate 50mg BID to metop succinate 50mg qd given HRs in the 40s  - continue furosemide 40mg qd  - continue home ASA  - please arrange outpt cardiologist within 2w of DC    Note is incomplete    *** Recommendations are preliminary until cosigned by the attending.    Humble Haley MD  Cardiology Fellow    For all New Consults and Questions:  [email or web address]   Login: [facility]iology Progress Note  ------------------------------------------------------------------------------------------    SUBJECTIVE/EVENTS:  - Tele: Sinus rhythm w/ rates in the 40-50s  - NAEO    -------------------------------------------------------------------------------------------  ROS:  CV: chest pain (-), palpitation (-), orthopnea (-), PND (-), edema (-)  PULM: SOB (-), cough (-), wheezing (-), hemoptysis (-).   CONST: fever (-), chills (-) or fatigue (-)  GI: abdominal distension (-), abdominal pain (-) , nausea/vomiting (-), hematemesis, (-), melena (-), hematochezia (-)  : dysuria (-), frequency (-), hematuria (-).   NEURO: numbness (-), weakness (-), dizziness (-)  MSK: myalgia (-), joint pain (-)   SKIN: itching (-), rash (-)  HEENT:  visual changes (-); vertigo or throat pain (-);  neck stiffness (-)   Psych: change in mood (-), anxiety (-), depression (-)     All other review of systems is negative unless indicated above.   -------------------------------------------------------------------------------------------  VITALS:  T(F): 98.7 (12-11), Max: 98.7 (12-11)  HR: 62 (12-11) (57 - 84)  BP: 144/69 (12-11) (119/73 - 144/69)  RR: 18 (12-11)  SpO2: 98% (12-11)  I&O's Summary    10 Dec 2023 07:01  -  11 Dec 2023 07:00  --------------------------------------------------------  IN: 480 mL / OUT: 0 mL / NET: 480 mL      ------------------------------------------------------------------------------------------  PHYSICAL EXAM:  GEN: NAD, sitting in bed  HEENT: NCAT, EOMI  CV: RRR, Ns1/s2, 2/6 systolic murmur over the LLSB, JVP not elevated  RESP: CTA B/l, no w/r/r  ABD: soft, nt, nd  EXT: warm, 2+ pulses, no edema  SKIN: no visible lesions, rashes  NEURO: AAOx3, no focal deficits  PSYCH: Calm, cooperative  -------------------------------------------------------------------------------------------  LABS:                          14.5   7.49  )-----------( 217      ( 11 Dec 2023 06:52 )             44.3     12-11    144  |  105  |  36<H>  ----------------------------<  165<H>  4.2   |  25  |  0.92    Ca    9.1      11 Dec 2023 06:52  Phos  3.8     12-11  Mg     2.10     12-11      PTT - ( 10 Dec 2023 23:42 )  PTT:73.6 sec  CARDIAC MARKERS ( 07 Dec 2023 18:10 )  21 ng/L / x     / x     / x     / x     / x      CARDIAC MARKERS ( 07 Dec 2023 16:49 )  23 ng/L / x     / x     / x     / x     / x                -------------------------------------------------------------------------------------------  Meds:  acetaminophen     Tablet .. 650 milliGRAM(s) Oral every 6 hours PRN  albuterol    90 MICROgram(s) HFA Inhaler 2 Puff(s) Inhalation every 6 hours PRN  aspirin enteric coated 81 milliGRAM(s) Oral daily  atorvastatin 20 milliGRAM(s) Oral at bedtime  buPROPion . 75 milliGRAM(s) Oral two times a day  dextrose 5%. 1000 milliLiter(s) IV Continuous <Continuous>  dextrose 5%. 1000 milliLiter(s) IV Continuous <Continuous>  dextrose 50% Injectable 25 Gram(s) IV Push once  dextrose 50% Injectable 25 Gram(s) IV Push once  dextrose 50% Injectable 12.5 Gram(s) IV Push once  dextrose Oral Gel 15 Gram(s) Oral once PRN  digoxin     Tablet 125 MICROGram(s) Oral <User Schedule>  furosemide    Tablet 40 milliGRAM(s) Oral daily  gabapentin 400 milliGRAM(s) Oral two times a day  glucagon  Injectable 1 milliGRAM(s) IntraMuscular once  heparin   Injectable 4000 Unit(s) IV Push every 6 hours PRN  heparin   Injectable 2000 Unit(s) IV Push every 6 hours PRN  heparin  Infusion.  Unit(s)/Hr IV Continuous <Continuous>  influenza  Vaccine (HIGH DOSE) 0.7 milliLiter(s) IntraMuscular once  insulin glargine Injectable (LANTUS) 10 Unit(s) SubCutaneous at bedtime  insulin lispro (ADMELOG) corrective regimen sliding scale   SubCutaneous three times a day before meals  melatonin 3 milliGRAM(s) Oral at bedtime PRN  metoprolol tartrate 50 milliGRAM(s) Oral every 12 hours  pantoprazole    Tablet 40 milliGRAM(s) Oral before breakfast  PARoxetine 10 milliGRAM(s) Oral daily  traMADol 50 milliGRAM(s) Oral daily PRN    -------------------------------------------------------------------------------------------  Cardiovascular Diagnostic Testing:      -------------------------------------------------------------------------------------------  Assessment and Plan:   Ms. Garcia is a 87yo woman w/ hc of COPD, HTN, TAVR in 2019, GERD, spinal stenosis s/p fusion in 2017 c/b PE s/p AC who presented w/ SOB thought 2/2 to new HFpEF s/p diuresis then found to be in AFib w/ RVR for which cardiology is consulted.    #AFib  #HFpEF  #HTN  #TAVR  Pt s/p diuresis and then went into AFib w/ RVR found to have rates in the 150-160s. S/p metop and dig w/ eventual spontaneous conversion to NSR. [id number] is 6 (Age, Woman, HTN, DM, HF). Pt started on AC. Pt now in NSR.    Recommendations:  - okay to start apixaban 2.5mg BID (dose reduced iso Age and weight <60kg) - pt w/ moderate MS which may be worsened on TTE iso Afib  - please stop digoxin  - convert metop tartrate 50mg BID to metop succinate 50mg qd given HRs in the 40s  - continue furosemide 40mg qd  - continue home ASA  - pt should get outpt evaluation for MVR  - please arrange outpt cardiologist within 2w of DC  - clear for DC from cardiology perspective    *** Recommendations are preliminary until cosigned by the attending.    Humble Haley MD  Cardiology Fellow    For all New Consults and Questions:  [email or web address]   Login: [employer] Cardiology Progress Note  ------------------------------------------------------------------------------------------    SUBJECTIVE/EVENTS:  - Tele: Sinus rhythm w/ rates in the 40-50s  - NAEO    -------------------------------------------------------------------------------------------  ROS:  CV: chest pain (-), palpitation (-), orthopnea (-), PND (-), edema (-)  PULM: SOB (-), cough (-), wheezing (-), hemoptysis (-).   CONST: fever (-), chills (-) or fatigue (-)  GI: abdominal distension (-), abdominal pain (-) , nausea/vomiting (-), hematemesis, (-), melena (-), hematochezia (-)  : dysuria (-), frequency (-), hematuria (-).   NEURO: numbness (-), weakness (-), dizziness (-)  MSK: myalgia (-), joint pain (-)   SKIN: itching (-), rash (-)  HEENT:  visual changes (-); vertigo or throat pain (-);  neck stiffness (-)   Psych: change in mood (-), anxiety (-), depression (-)     All other review of systems is negative unless indicated above.   -------------------------------------------------------------------------------------------  VITALS:  T(F): 98.7 (12-11), Max: 98.7 (12-11)  HR: 62 (12-11) (57 - 84)  BP: 144/69 (12-11) (119/73 - 144/69)  RR: 18 (12-11)  SpO2: 98% (12-11)  I&O's Summary    10 Dec 2023 07:01  -  11 Dec 2023 07:00  --------------------------------------------------------  IN: 480 mL / OUT: 0 mL / NET: 480 mL      ------------------------------------------------------------------------------------------  PHYSICAL EXAM:  GEN: NAD, sitting in bed  HEENT: NCAT, EOMI  CV: RRR, Ns1/s2, 2/6 systolic murmur over the LLSB, JVP not elevated  RESP: CTA B/l, no w/r/r  ABD: soft, nt, nd  EXT: warm, 2+ pulses, no edema  SKIN: no visible lesions, rashes  NEURO: AAOx3, no focal deficits  PSYCH: Calm, cooperative  -------------------------------------------------------------------------------------------  LABS:                          14.5   7.49  )-----------( 217      ( 11 Dec 2023 06:52 )             44.3     12-11    144  |  105  |  36<H>  ----------------------------<  165<H>  4.2   |  25  |  0.92    Ca    9.1      11 Dec 2023 06:52  Phos  3.8     12-11  Mg     2.10     12-11      PTT - ( 10 Dec 2023 23:42 )  PTT:73.6 sec  CARDIAC MARKERS ( 07 Dec 2023 18:10 )  21 ng/L / x     / x     / x     / x     / x      CARDIAC MARKERS ( 07 Dec 2023 16:49 )  23 ng/L / x     / x     / x     / x     / x                -------------------------------------------------------------------------------------------  Meds:  acetaminophen     Tablet .. 650 milliGRAM(s) Oral every 6 hours PRN  albuterol    90 MICROgram(s) HFA Inhaler 2 Puff(s) Inhalation every 6 hours PRN  aspirin enteric coated 81 milliGRAM(s) Oral daily  atorvastatin 20 milliGRAM(s) Oral at bedtime  buPROPion . 75 milliGRAM(s) Oral two times a day  dextrose 5%. 1000 milliLiter(s) IV Continuous <Continuous>  dextrose 5%. 1000 milliLiter(s) IV Continuous <Continuous>  dextrose 50% Injectable 25 Gram(s) IV Push once  dextrose 50% Injectable 25 Gram(s) IV Push once  dextrose 50% Injectable 12.5 Gram(s) IV Push once  dextrose Oral Gel 15 Gram(s) Oral once PRN  digoxin     Tablet 125 MICROGram(s) Oral <User Schedule>  furosemide    Tablet 40 milliGRAM(s) Oral daily  gabapentin 400 milliGRAM(s) Oral two times a day  glucagon  Injectable 1 milliGRAM(s) IntraMuscular once  heparin   Injectable 4000 Unit(s) IV Push every 6 hours PRN  heparin   Injectable 2000 Unit(s) IV Push every 6 hours PRN  heparin  Infusion.  Unit(s)/Hr IV Continuous <Continuous>  influenza  Vaccine (HIGH DOSE) 0.7 milliLiter(s) IntraMuscular once  insulin glargine Injectable (LANTUS) 10 Unit(s) SubCutaneous at bedtime  insulin lispro (ADMELOG) corrective regimen sliding scale   SubCutaneous three times a day before meals  melatonin 3 milliGRAM(s) Oral at bedtime PRN  metoprolol tartrate 50 milliGRAM(s) Oral every 12 hours  pantoprazole    Tablet 40 milliGRAM(s) Oral before breakfast  PARoxetine 10 milliGRAM(s) Oral daily  traMADol 50 milliGRAM(s) Oral daily PRN    -------------------------------------------------------------------------------------------  Cardiovascular Diagnostic Testing:      -------------------------------------------------------------------------------------------  Assessment and Plan:   Ms. Garcia is a 87yo woman w/ hc of COPD, HTN, TAVR in 2019, GERD, spinal stenosis s/p fusion in 2017 c/b PE s/p AC who presented w/ SOB thought 2/2 to new HFpEF s/p diuresis then found to be in AFib w/ RVR for which cardiology is consulted.    #AFib  #HFpEF  #HTN  #TAVR  Pt s/p diuresis and then went into AFib w/ RVR found to have rates in the 150-160s. S/p metop and dig w/ eventual spontaneous conversion to NSR. [id number] is 6 (Age, Woman, HTN, DM, HF). Pt started on AC. Pt now in NSR.    Recommendations:  - okay to start apixaban 2.5mg BID (dose reduced iso Age and weight <60kg) - pt w/ moderate MS which may be worsened on TTE iso Afib  - please stop digoxin  - convert metop tartrate 50mg BID to metop succinate 50mg qd given HRs in the 40s  - continue furosemide 40mg qd  - continue home ASA  - pt should get outpt evaluation for MVR  - please arrange outpt cardiologist within 2w of DC  - clear for DC from cardiology perspective    *** Recommendations are preliminary until cosigned by the attending.    Humble Haley MD  Cardiology Fellow    For all New Consults and Questions:  [email or web address]   Login: [employer]

## 2023-12-12 LAB
CULTURE RESULTS: SIGNIFICANT CHANGE UP
SPECIMEN SOURCE: SIGNIFICANT CHANGE UP

## 2024-01-05 NOTE — PHYSICAL THERAPY INITIAL EVALUATION ADULT - PERTINENT HX OF CURRENT PROBLEM, REHAB EVAL
Rx Refill Note  Requested Prescriptions     Pending Prescriptions Disp Refills    lisinopril (PRINIVIL,ZESTRIL) 10 MG tablet [Pharmacy Med Name: LISINOPRIL 10 MG TAB 10 Tablet] 30 tablet 2     Sig: TAKE 1 TABLET BY MOUTH DAILY      Last office visit with prescribing clinician: 10/17/2023   Last telemedicine visit with prescribing clinician: Visit date not found   Next office visit with prescribing clinician: 3/1/2024                         Would you like a call back once the refill request has been completed: [] Yes [] No    If the office needs to give you a call back, can they leave a voicemail: [] Yes [] No    Faith Rea LPN  01/05/24, 07:13 CST    This is an 80 y/o F s/p rev L4-L5 Lami/ disc insitu fusion on 8/10/17.

## 2024-02-28 ENCOUNTER — APPOINTMENT (OUTPATIENT)
Dept: VASCULAR SURGERY | Facility: CLINIC | Age: 89
End: 2024-02-28
Payer: MEDICARE

## 2024-02-28 VITALS
TEMPERATURE: 97.7 F | BODY MASS INDEX: 20.76 KG/M2 | SYSTOLIC BLOOD PRESSURE: 123 MMHG | WEIGHT: 103 LBS | DIASTOLIC BLOOD PRESSURE: 76 MMHG | HEIGHT: 59 IN | HEART RATE: 56 BPM

## 2024-02-28 PROCEDURE — 93923 UPR/LXTR ART STDY 3+ LVLS: CPT

## 2024-02-28 PROCEDURE — 99203 OFFICE O/P NEW LOW 30 MIN: CPT

## 2024-02-28 NOTE — HISTORY OF PRESENT ILLNESS
[FreeTextEntry1] : Patient is a 87 yo female who presented to the office for evaluation of left first toe wound. Patient states about 6 months ago she noticed a wound on her left first toe after a pedicure. She has been seeing a podiatrist and received a course of oral antibiotics. Since then she states the wound has healed and her toe looks better. She recently developed a wound on the dorsal aspect of her foot, likely from her shoe. She states it has not been getting worse. She denies any motor/sensory deficits of the lower extremity. Denies any rest pain or claudication.

## 2024-02-28 NOTE — ASSESSMENT
[FreeTextEntry1] : Patient is a 87 yo F who presents to the office for evaluation of healed left first toe wound. New wound on the dorsal aspect of the left foot.   - TEVIN/PVR with evidence of mild arterial insufficiency, TP of 37 on the left toe. Patient however with healed left first toe wound.  - New wound on left foot, likely traumatic secondary to shoes. Will continue to monitor wound, and if not healing will plan for lower extremity angiogram.  cont ASA and statin - Recommend close follow up with podiatry  - Follow up in one month for re-evaluation

## 2024-02-28 NOTE — DATA REVIEWED
[FreeTextEntry1] : TEVIN/PVR reviewed: Patient with adequate pressures to the ankle, TP of 37, waveforms dampened.

## 2024-02-28 NOTE — CONSULT LETTER
[Consult Letter:] : I had the pleasure of evaluating your patient, [unfilled]. [Dear  ___] : Dear  [unfilled], [Sincerely,] : Sincerely, [Consult Closing:] : Thank you very much for allowing me to participate in the care of this patient.  If you have any questions, please do not hesitate to contact me. [Please see my note below.] : Please see my note below. [FreeTextEntry3] : Anais Dixon MD, FSVS, FACS Associate Chief, Vascular & Endovascular Surgery , Vascular Surgery Fellowship & Residency  Associate Professor of Surgery, [facility] [employer] at [facility] [place]  Division of Vascular & Endovascular Surgery [facility] [address], [address] [place] [id number] Tel: [phone]

## 2024-02-28 NOTE — PHYSICAL EXAM
[Respiratory Effort] : normal respiratory effort [2+] : left 2+ [1+] : right 1+ [0] : left 0 [Alert] : alert [Oriented to Person] : oriented to person [Oriented to Time] : oriented to time [Oriented to Place] : oriented to place [Varicose Veins Of Lower Extremities] : not present [Ankle Swelling (On Exam)] : not present [] : not present [de-identified] : NAD [FreeTextEntry1] : left first toe wound healed, anterior wound on the dorsal aspect of foot, small, no signed of infection

## 2024-03-27 ENCOUNTER — APPOINTMENT (OUTPATIENT)
Dept: VASCULAR SURGERY | Facility: CLINIC | Age: 89
End: 2024-03-27
Payer: MEDICARE

## 2024-03-27 VITALS — HEART RATE: 47 BPM | DIASTOLIC BLOOD PRESSURE: 60 MMHG | SYSTOLIC BLOOD PRESSURE: 100 MMHG

## 2024-03-27 VITALS
WEIGHT: 102 LBS | DIASTOLIC BLOOD PRESSURE: 60 MMHG | SYSTOLIC BLOOD PRESSURE: 96 MMHG | HEART RATE: 49 BPM | TEMPERATURE: 97.6 F | BODY MASS INDEX: 20.56 KG/M2 | HEIGHT: 59 IN

## 2024-03-27 DIAGNOSIS — I73.9 PERIPHERAL VASCULAR DISEASE, UNSPECIFIED: ICD-10-CM

## 2024-03-27 PROCEDURE — 99213 OFFICE O/P EST LOW 20 MIN: CPT

## 2024-03-27 RX ORDER — METOPROLOL TARTRATE 75 MG/1
TABLET, FILM COATED ORAL
Refills: 0 | Status: ACTIVE | COMMUNITY

## 2024-03-27 NOTE — ASSESSMENT
[FreeTextEntry1] : Impression - PAD with healed left toe #1 wound and healed left dorsal foot wound. Pt is doing well and is asymptomatic   Plan Conservative medical management - exercise regimen, foot care and protection Continue ASA and statin Continue to follow up with podiatry Return to office in 1 year March 2025 to evaluate bilateral lower extremities with TEVIN/PVR [Medication Management] : medication management [Arterial/Venous Disease] : arterial/venous disease [Foot care/Footwear] : foot care/footwear

## 2024-03-27 NOTE — HISTORY OF PRESENT ILLNESS
[FreeTextEntry1] : Patient is a 89 yo female who presented to the office for evaluation of left first toe wound. Patient states about 6 months ago she noticed a wound on her left first toe after a pedicure. She has been seeing a podiatrist and received a course of oral antibiotics. Since then she states the wound has healed and her toe looks better. She recently developed a wound on the dorsal aspect of her foot, likely from her shoe. She states it has not been getting worse. She denies any motor/sensory deficits of the lower extremity. Denies any rest pain or claudication.  [de-identified] : 3/27/2024 Pt presents to the office to evaluate left toe #1 wound. Wound has healed. Denies claudication, rest pain or wounds. She's on asa and statin.

## 2024-04-03 ENCOUNTER — APPOINTMENT (OUTPATIENT)
Dept: VASCULAR SURGERY | Facility: CLINIC | Age: 89
End: 2024-04-03

## 2024-08-01 NOTE — H&P PST ADULT - OPHTHALMOLOGIC
Received request via: Pharmacy    Medication Name/Dosage carbidopa-levodopa (SINEMET)  MG Tab  Take 2 Tablets by mouth 3 times a day     When was medication last prescribed 11/02/2023    How many refills were previously provided 2    How many Refills does he patient have left from last prescription 0    Was the patient seen in the last year in this department? Yes   Date of last office visit 05/21/2024     Per last Neurology Office Visit, when was the date of next follow up visit set for?             3 months               Date of office visit follow up request 08/2/2024     Does the patient have an upcoming appointment? Yes   If yes, when 08/22/2024             If no, schedule appointment scheduled    Does the patient have senior living Plus and need 100 day supply (blood pressure, diabetes and cholesterol meds only)? Patient does not have SCP     
details…

## 2025-01-15 ENCOUNTER — INPATIENT (INPATIENT)
Facility: HOSPITAL | Age: 89
LOS: 6 days | Discharge: NURSING, REHAB OR LONG-TERM CARE | End: 2025-01-22
Attending: STUDENT IN AN ORGANIZED HEALTH CARE EDUCATION/TRAINING PROGRAM | Admitting: STUDENT IN AN ORGANIZED HEALTH CARE EDUCATION/TRAINING PROGRAM
Payer: MEDICARE

## 2025-01-15 VITALS
HEART RATE: 110 BPM | SYSTOLIC BLOOD PRESSURE: 103 MMHG | WEIGHT: 93.04 LBS | DIASTOLIC BLOOD PRESSURE: 67 MMHG | OXYGEN SATURATION: 92 % | RESPIRATION RATE: 20 BRPM | TEMPERATURE: 98 F

## 2025-01-15 DIAGNOSIS — Z98.890 OTHER SPECIFIED POSTPROCEDURAL STATES: Chronic | ICD-10-CM

## 2025-01-15 DIAGNOSIS — I50.9 HEART FAILURE, UNSPECIFIED: ICD-10-CM

## 2025-01-15 DIAGNOSIS — I50.43 ACUTE ON CHRONIC COMBINED SYSTOLIC (CONGESTIVE) AND DIASTOLIC (CONGESTIVE) HEART FAILURE: ICD-10-CM

## 2025-01-15 DIAGNOSIS — I50.33 ACUTE ON CHRONIC DIASTOLIC (CONGESTIVE) HEART FAILURE: ICD-10-CM

## 2025-01-15 DIAGNOSIS — N17.9 ACUTE KIDNEY FAILURE, UNSPECIFIED: ICD-10-CM

## 2025-01-15 DIAGNOSIS — Z90.3 ACQUIRED ABSENCE OF STOMACH [PART OF]: Chronic | ICD-10-CM

## 2025-01-15 DIAGNOSIS — E87.20 ACIDOSIS, UNSPECIFIED: ICD-10-CM

## 2025-01-15 DIAGNOSIS — I48.19 OTHER PERSISTENT ATRIAL FIBRILLATION: ICD-10-CM

## 2025-01-15 DIAGNOSIS — Z90.49 ACQUIRED ABSENCE OF OTHER SPECIFIED PARTS OF DIGESTIVE TRACT: Chronic | ICD-10-CM

## 2025-01-15 DIAGNOSIS — Z98.41 CATARACT EXTRACTION STATUS, RIGHT EYE: Chronic | ICD-10-CM

## 2025-01-15 DIAGNOSIS — I48.91 UNSPECIFIED ATRIAL FIBRILLATION: ICD-10-CM

## 2025-01-15 LAB
ADD ON TEST-SPECIMEN IN LAB: SIGNIFICANT CHANGE UP
ADD ON TEST-SPECIMEN IN LAB: SIGNIFICANT CHANGE UP
ALBUMIN SERPL ELPH-MCNC: 3.7 G/DL — SIGNIFICANT CHANGE UP (ref 3.3–5)
ALP SERPL-CCNC: 91 U/L — SIGNIFICANT CHANGE UP (ref 40–120)
ALT FLD-CCNC: 21 U/L — SIGNIFICANT CHANGE UP (ref 4–33)
ANION GAP SERPL CALC-SCNC: 17 MMOL/L — HIGH (ref 7–14)
APPEARANCE UR: CLEAR — SIGNIFICANT CHANGE UP
APTT BLD: 43.5 SEC — HIGH (ref 24.5–35.6)
AST SERPL-CCNC: 19 U/L — SIGNIFICANT CHANGE UP (ref 4–32)
BASOPHILS # BLD AUTO: 0.05 K/UL — SIGNIFICANT CHANGE UP (ref 0–0.2)
BASOPHILS NFR BLD AUTO: 0.7 % — SIGNIFICANT CHANGE UP (ref 0–2)
BILIRUB SERPL-MCNC: 0.5 MG/DL — SIGNIFICANT CHANGE UP (ref 0.2–1.2)
BILIRUB UR-MCNC: NEGATIVE — SIGNIFICANT CHANGE UP
BUN SERPL-MCNC: 48 MG/DL — HIGH (ref 7–23)
CALCIUM SERPL-MCNC: 9.3 MG/DL — SIGNIFICANT CHANGE UP (ref 8.4–10.5)
CHLORIDE SERPL-SCNC: 97 MMOL/L — LOW (ref 98–107)
CO2 SERPL-SCNC: 22 MMOL/L — SIGNIFICANT CHANGE UP (ref 22–31)
COLOR SPEC: YELLOW — SIGNIFICANT CHANGE UP
CREAT SERPL-MCNC: 1.6 MG/DL — HIGH (ref 0.5–1.3)
DIFF PNL FLD: NEGATIVE — SIGNIFICANT CHANGE UP
EGFR: 31 ML/MIN/1.73M2 — LOW
EOSINOPHIL # BLD AUTO: 0.09 K/UL — SIGNIFICANT CHANGE UP (ref 0–0.5)
EOSINOPHIL NFR BLD AUTO: 1.3 % — SIGNIFICANT CHANGE UP (ref 0–6)
FLUAV AG NPH QL: SIGNIFICANT CHANGE UP
FLUBV AG NPH QL: SIGNIFICANT CHANGE UP
GAS PNL BLDV: SIGNIFICANT CHANGE UP
GLUCOSE SERPL-MCNC: 131 MG/DL — HIGH (ref 70–99)
GLUCOSE UR QL: 500 MG/DL
HCT VFR BLD CALC: 47.2 % — HIGH (ref 34.5–45)
HGB BLD-MCNC: 15.3 G/DL — SIGNIFICANT CHANGE UP (ref 11.5–15.5)
IANC: 4.74 K/UL — SIGNIFICANT CHANGE UP (ref 1.8–7.4)
IMM GRANULOCYTES NFR BLD AUTO: 0.4 % — SIGNIFICANT CHANGE UP (ref 0–0.9)
INR BLD: 1.53 RATIO — HIGH (ref 0.85–1.16)
KETONES UR-MCNC: NEGATIVE MG/DL — SIGNIFICANT CHANGE UP
LEUKOCYTE ESTERASE UR-ACNC: NEGATIVE — SIGNIFICANT CHANGE UP
LYMPHOCYTES # BLD AUTO: 1.17 K/UL — SIGNIFICANT CHANGE UP (ref 1–3.3)
LYMPHOCYTES # BLD AUTO: 16.4 % — SIGNIFICANT CHANGE UP (ref 13–44)
MCHC RBC-ENTMCNC: 29.3 PG — SIGNIFICANT CHANGE UP (ref 27–34)
MCHC RBC-ENTMCNC: 32.4 G/DL — SIGNIFICANT CHANGE UP (ref 32–36)
MCV RBC AUTO: 90.4 FL — SIGNIFICANT CHANGE UP (ref 80–100)
MONOCYTES # BLD AUTO: 1.04 K/UL — HIGH (ref 0–0.9)
MONOCYTES NFR BLD AUTO: 14.6 % — HIGH (ref 2–14)
NEUTROPHILS # BLD AUTO: 4.74 K/UL — SIGNIFICANT CHANGE UP (ref 1.8–7.4)
NEUTROPHILS NFR BLD AUTO: 66.6 % — SIGNIFICANT CHANGE UP (ref 43–77)
NITRITE UR-MCNC: NEGATIVE — SIGNIFICANT CHANGE UP
NRBC # BLD AUTO: 0.02 K/UL — HIGH (ref 0–0)
NRBC # BLD: 0 /100 WBCS — SIGNIFICANT CHANGE UP (ref 0–0)
NRBC # FLD: 0.02 K/UL — HIGH (ref 0–0)
NRBC BLD-RTO: 0 /100 WBCS — SIGNIFICANT CHANGE UP (ref 0–0)
NT-PROBNP SERPL-SCNC: 5092 PG/ML — HIGH
PH UR: 5.5 — SIGNIFICANT CHANGE UP (ref 5–8)
PLATELET # BLD AUTO: 286 K/UL — SIGNIFICANT CHANGE UP (ref 150–400)
POTASSIUM SERPL-MCNC: 4.4 MMOL/L — SIGNIFICANT CHANGE UP (ref 3.5–5.3)
POTASSIUM SERPL-SCNC: 4.4 MMOL/L — SIGNIFICANT CHANGE UP (ref 3.5–5.3)
PROT SERPL-MCNC: 6.7 G/DL — SIGNIFICANT CHANGE UP (ref 6–8.3)
PROT UR-MCNC: NEGATIVE MG/DL — SIGNIFICANT CHANGE UP
PROTHROM AB SERPL-ACNC: 17.7 SEC — HIGH (ref 9.9–13.4)
RBC # BLD: 5.22 M/UL — HIGH (ref 3.8–5.2)
RBC # FLD: 14.6 % — HIGH (ref 10.3–14.5)
RSV RNA NPH QL NAA+NON-PROBE: SIGNIFICANT CHANGE UP
SARS-COV-2 RNA SPEC QL NAA+PROBE: SIGNIFICANT CHANGE UP
SODIUM SERPL-SCNC: 136 MMOL/L — SIGNIFICANT CHANGE UP (ref 135–145)
SP GR SPEC: 1.01 — SIGNIFICANT CHANGE UP (ref 1–1.03)
UROBILINOGEN FLD QL: 0.2 MG/DL — SIGNIFICANT CHANGE UP (ref 0.2–1)
WBC # BLD: 7.12 K/UL — SIGNIFICANT CHANGE UP (ref 3.8–10.5)
WBC # FLD AUTO: 7.12 K/UL — SIGNIFICANT CHANGE UP (ref 3.8–10.5)

## 2025-01-15 PROCEDURE — 99285 EMERGENCY DEPT VISIT HI MDM: CPT

## 2025-01-15 PROCEDURE — 99223 1ST HOSP IP/OBS HIGH 75: CPT

## 2025-01-15 PROCEDURE — 71045 X-RAY EXAM CHEST 1 VIEW: CPT | Mod: 26

## 2025-01-15 RX ORDER — ACETAMINOPHEN 160 MG/5ML
625 SUSPENSION ORAL ONCE
Refills: 0 | Status: DISCONTINUED | OUTPATIENT
Start: 2025-01-15 | End: 2025-01-15

## 2025-01-15 RX ORDER — METOPROLOL SUCCINATE 25 MG
12.5 TABLET, EXTENDED RELEASE 24 HR ORAL ONCE
Refills: 0 | Status: COMPLETED | OUTPATIENT
Start: 2025-01-15 | End: 2025-01-15

## 2025-01-15 RX ORDER — METOPROLOL SUCCINATE 25 MG
5 TABLET, EXTENDED RELEASE 24 HR ORAL ONCE
Refills: 0 | Status: DISCONTINUED | OUTPATIENT
Start: 2025-01-15 | End: 2025-01-15

## 2025-01-15 RX ORDER — BACTERIOSTATIC SODIUM CHLORIDE 0.9 %
500 VIAL (ML) INJECTION ONCE
Refills: 0 | Status: COMPLETED | OUTPATIENT
Start: 2025-01-15 | End: 2025-01-15

## 2025-01-15 RX ORDER — DILTIAZEM HYDROCHLORIDE 60 MG/1
10 TABLET ORAL ONCE
Refills: 0 | Status: DISCONTINUED | OUTPATIENT
Start: 2025-01-15 | End: 2025-01-15

## 2025-01-15 RX ORDER — METOPROLOL SUCCINATE 25 MG
25 TABLET, EXTENDED RELEASE 24 HR ORAL ONCE
Refills: 0 | Status: COMPLETED | OUTPATIENT
Start: 2025-01-15 | End: 2025-01-15

## 2025-01-15 RX ORDER — APIXABAN 5 MG/1
2.5 TABLET, FILM COATED ORAL ONCE
Refills: 0 | Status: COMPLETED | OUTPATIENT
Start: 2025-01-15 | End: 2025-01-15

## 2025-01-15 RX ORDER — ACETAMINOPHEN 160 MG/5ML
650 SUSPENSION ORAL ONCE
Refills: 0 | Status: COMPLETED | OUTPATIENT
Start: 2025-01-15 | End: 2025-01-15

## 2025-01-15 RX ORDER — ACETAMINOPHEN 160 MG/5ML
650 SUSPENSION ORAL EVERY 6 HOURS
Refills: 0 | Status: DISCONTINUED | OUTPATIENT
Start: 2025-01-15 | End: 2025-01-22

## 2025-01-15 RX ADMIN — APIXABAN 2.5 MILLIGRAM(S): 5 TABLET, FILM COATED ORAL at 22:38

## 2025-01-15 RX ADMIN — Medication 250 MICROGRAM(S): at 22:37

## 2025-01-15 RX ADMIN — Medication 12.5 MILLIGRAM(S): at 16:56

## 2025-01-15 RX ADMIN — Medication 500 MILLILITER(S): at 17:00

## 2025-01-15 RX ADMIN — Medication 40 MILLIGRAM(S): at 16:56

## 2025-01-15 RX ADMIN — Medication 25 MILLIGRAM(S): at 21:20

## 2025-01-15 NOTE — H&P ADULT - NSHPPHYSICALEXAM_GEN_ALL_CORE
Vital Signs Last 24 Hrs  T(C): 36.8 (15 Tashi 2025 21:17), Max: 36.9 (15 Tashi 2025 14:10)  T(F): 98.2 (15 Tashi 2025 21:17), Max: 98.4 (15 Tashi 2025 14:10)  HR: 129 (15 Tashi 2025 21:17) (69 - 140)  BP: 93/80 (15 Tashi 2025 21:17) (93/80 - 116/79)  BP(mean): 86 (15 Tashi 2025 21:17) (86 - 91)  RR: 19 (15 Tashi 2025 17:14) (17 - 20)  SpO2: 100% (15 Tashi 2025 17:14) (92% - 100%)    Parameters below as of 15 Tashi 2025 17:14  Patient On (Oxygen Delivery Method): room air    ================================================== Vital Signs Last 24 Hrs  T(C): 36.8 (15 Tashi 2025 21:17), Max: 36.9 (15 Tashi 2025 14:10)  T(F): 98.2 (15 Tashi 2025 21:17), Max: 98.4 (15 Tashi 2025 14:10)  HR: 129 (15 Tashi 2025 21:17) (69 - 140)  BP: 93/80 (15 Tashi 2025 21:17) (93/80 - 116/79)  BP(mean): 86 (15 Tashi 2025 21:17) (86 - 91)  RR: 19 (15 Tashi 2025 17:14) (17 - 20)  SpO2: 100% (15 Tashi 2025 17:14) (92% - 100%)    Parameters below as of 15 Tashi 2025 17:14  Patient On (Oxygen Delivery Method): room air    ==================================================    PHYSICAL EXAMINATION:    APPEARANCE: Adequately groomed, sad and malnourished/cachectic appearing female, lying propped up in bed in NAD  HEENT: Dry oral mucosa.  L-eyelid more raised than the right.  Glossy appearance of  eyes.  Photophobia  LYMPHATIC: No lymphadenopathy appreciated  CARDIOVASCULAR: (+) S1 S2, irregular, irregular.  Tachycardic.  No JVD.  No gross murmurs appreciated.  No edema  RESPIRATORY: No wheezing, rhonchi, crackles appreciated  GASTROINTESTINAL: Soft.  Non-tender.  (+) BS  GENITOURINARY: No suprapubic tenderness.  No CVA tenderness B/L  EXTREMITIES: Normal range of motion.  No clubbing, cyanosis or edema  MUSCULOSKELETAL: Band-aid on L-great toe and 5th toe.  No atrophy.  No asymmetry.  Good ROM  SKIN: Small wound on the lateral R-great toe.  No rashes. No ecchymoses.  No cyanosis  PSYCHIATRIC: A&O x 3.  Sad appearing.  Pleasant.  Smiled briefly  NEUROLOGICAL: Non-focal, BREWER x 4 against gravity  VASCULAR: Peripheral pulses palpable

## 2025-01-15 NOTE — H&P ADULT - PROBLEM SELECTOR PLAN 4
pH = 7.31, pCO2 = 53, HCO3 = 22, AG = 17  Metabolic acidosis, respiratory acidosis and metabolic alkalosis  In the setting of persistent shortness of breath x ~ one month, renal failure, dehydration, poor constitutional state  Maintaining good O2 Saturation or room air  - being fluid challenged  - f/u repeat values in the AM

## 2025-01-15 NOTE — ED ADULT TRIAGE NOTE - CHIEF COMPLAINT QUOTE
Pt from MD office for hypoxia. Pt unable to walk starting 2 weeks ago, now using wheelchair. Son also states he had lost about 20 pounds in the past month. Pt denies chest pain, dizziness, endorses SOB. Sating 92% on RA, placed on 2l NC. Phx of b/l pleural effusion, CHF, HTN, HLD, PE. Pt from MD office for hypoxia. Pt unable to walk starting 2 weeks ago, now using wheelchair. Son also states he had lost about 20 pounds in the past month. Pt denies chest pain, dizziness, endorses SOB. Sating 92% on RA, placed on 2l NC. Phx of b/l pleural effusion, CHF, HTN, HLD, PE.  Pt noted to be tachy to 139 on EKG

## 2025-01-15 NOTE — H&P ADULT - PROBLEM SELECTOR PLAN 3
BUN/Cr = 48/1.60; no recent values for comparison, but in 2023, renal function within acceptable ranges  Also with metabolic acidosis, metabolic alkalosis (as well as respiratory acidosis)  Suspect that this could be due to dehydration versus cardiorenal syndrome versus medication impact versus...  - f/u renal function in the AM  - if renal function worsening, would get Nephrology consult

## 2025-01-15 NOTE — H&P ADULT - PROBLEM SELECTOR PLAN 5
No bowel movement for the past at least 3 days, despite use of laxative  Could be impacting cardiac state  TSH = 4.96, but FT4 within acceptable range (1.3)  - prescribing senna 2 tabs HS  - prescribing dulcolax 5 mg Q12H PRN  - ensure optimal hydration  - f/u electrolytes  - f/u stool count

## 2025-01-15 NOTE — ED PROVIDER NOTE - ATTENDING CONTRIBUTION TO CARE
88 yo F hx HTN, HLD, CHF, COPD not on O2, PE, AF on Eliquis, presenting with complaints of sob x one month. Pt attributes symptoms to switching from entresto to spironolactone. Pt seen by pcp and had chest xray that showed pleural effusions and ? infiltrate R for which she was started on Levaquin. She also had outpatient DVT study one week ago, negative for DVT. Pt has had generalized weakness and had trouble walking around at home, requiring assistance to get around. Denies falls/trauma. Denies chest pain. Noted to be in afib RVR today rate 130-150s. She did not take any of her meds today, except for antibiotics. Prescribed metoprolol. EKG shows afib rvr. Will assess for underlying causes for afib today, treat with home meds. Consider infection, electrolyte derangements. Pt does not appear fluid overloaded on exam. No LE edema, no crackles on exam. Pt likely tba.

## 2025-01-15 NOTE — H&P ADULT - NSHPLABSRESULTS_GEN_ALL_CORE
LAB-WORK/STUDIES:                          15.3   7.12  )-----------( 286      ( 15 Tashi 2025 17:00 )             47.2     136  |  97[L]  |  48[H]  ----------------------------<  131[H]     01-15  4.4   |  22  |  1.60[H]    Ca    9.3      15 Tashi 2025 17:00    TPro  6.7  /  Alb  3.7  /  TBili  0.5  /  DBili  x   /  AST  19  /  ALT  21  /  AlkPhos  91  01-15    PT/INR: 17.7/1.53 (01-15-25 @ 17:00)  PTT: 43.5 (01-15-25 @ 17:00)    17:00 - VBG - pH: 7.31  | pCO2: 53    | pO2: 30    | Lactate: 2.8      Pro-BNP: 5092 (01-15-25 @ 17:00)        Urinalysis Basic - ( 15 Tashi 2025 18:26 )  Color: Yellow / Appearance: Clear / S.015 / pH: 5.5  Gluc: 500 mg/dL / Ketone: Negative mg/dL  / Bili: Negative / Urobili: 0.2 mg/dL   Blood: Negative / Protein: Negative mg/dL / Nitrite: Negative   Leuk Esterase: Negative / RBC: x / WBC x   Sq Epi: x / Bacteria: x  Hyaline Casts: x/WBC Casts: x    ===================================================        ===================================================  .

## 2025-01-15 NOTE — H&P ADULT - PROBLEM SELECTOR PLAN 1
Initially thought to be due to acute exacerbation of diastolic heart failure, but patient clinically appears dehydrated, despite elevated Pro-BNP (5092)  CXR indicative of possible pulmonary edema and moderate L-pleural effusion.  Has enlarged cardiac silhouette.  (personally reviewed).  Per chart review, L-pleural effusion and enlarged cardiac silhouette chronic  Suspicion for dehydration in the setting of metabolic/lactic acidosis, hemoconcentrated lab-work, hypotension with tachycardia/A-fib w/ RVR (rate to 140's), suspected JAY (BUN/Cr = 48/1.60)  Suspect that increased Pro-BNP is unlikely chiefly of cardiac origin, but could be linked to the chronic pulmonary state  Last TTE of 12/08/2023 with Grade II diastolic dysfunction and severe mitral regurgitation  Possibility that constitutional state is also impacting shortness of breath (unexplained weight loss, despite excellent appetite)  Received NaCl 500 mL, metoprolol 12.5 mg and furosemide 40 mg IV in the ED  - prescribing digoxin 250 mcg IV x one  - prescribing IVF NaCl 250 mL x one over 30 minutes  - HOB elevation, intake/output Q4H, weight daily, fluid restriction of 1.2 liters daily  - Cardiology consult in the AM (Reese Gonzalez MD; please call) Initially thought to be due to acute exacerbation of diastolic heart failure, but patient clinically appears dehydrated, despite elevated Pro-BNP (5092)  CXR indicative of possible pulmonary edema and moderate L-pleural effusion.  Has enlarged cardiac silhouette.  (personally reviewed).  Per chart review, L-pleural effusion and enlarged cardiac silhouette chronic  Suspicion for dehydration in the setting of metabolic/lactic acidosis, hemoconcentrated lab-work, hypotension with tachycardia/A-fib w/ RVR (rate to 140's), suspected JAY (BUN/Cr = 48/1.60)  Suspect that increased Pro-BNP is unlikely chiefly of cardiac origin, but could be linked to the chronic pulmonary state  Last TTE of 12/08/2023 with Grade II diastolic dysfunction and severe mitral regurgitation  Possibility that constitutional state (unexplained weight loss, despite excellent appetite), constipation could also be impacting SOB  Received NaCl 500 mL, metoprolol 12.5 mg and furosemide 40 mg IV in the ED  - prescribing digoxin 250 mcg IV x one  - prescribing IVF NaCl 250 mL x one over 30 minutes  - HOB elevation, intake/output Q4H, weight daily, fluid restriction of 1.2 liters daily  - Cardiology consult in the AM (Reese Gonzalez MD; please call)

## 2025-01-15 NOTE — H&P ADULT - PROBLEM SELECTOR PLAN 2
ECG with same; rate at 139 bpm  Presented with shortness of breath; suspect that A-fib is triggered by dehydration ECG with same; rate at 139 bpm  Presented with shortness of breath; suspect that A-fib is triggered by dehydration; trial of 250 mL IVF prescribed  A-fib could also be influenced by pleural effusion (longstanding, however), constitutional state...  - per chart - f/u AM lab-work  - prescribing Eliquis 2.5 mg x one overnight; patient confirms taking AC, but unsure of dose  - s/p metoprolol 12.5 mg x one in the ED, but still tachycardic  - prescribed digoxin 0.25 mg IV x one   - f/u thyroid panel

## 2025-01-15 NOTE — H&P ADULT - NSICDXPASTMEDICALHX_GEN_ALL_CORE_FT
PAST MEDICAL HISTORY:  Acid reflux     Ankle fracture left    Aortic stenosis     Atrial fibrillation     Borderline diabetes     COPD with asthma not on any meds ,deneis any exacerbation    Former smoker     History of pulmonary embolism 2017 after  back surgery- treated with AC  for 1 year - Ct chest done as per surgeon    HLD (hyperlipidemia)     Major depressive disorder     Osteoporosis     Other intervertebral disc displacement, lumbar region     Poor historian     Radiculopathy     Shingles outbreak in 2005 2 months after back surgery    Vitamin D deficiency

## 2025-01-15 NOTE — H&P ADULT - PROBLEM SELECTOR PLAN 10
Already on AC of Eliquis for A-fib  HOB elevation  Aspiration precautions Patient unable to provide medication list  No recent information in [facility] (last admitted in 2023)  Patient confirms taking AC - Last dose recorded in records = 2.5 mg Q12H.  Prescribed overnight, and will continue same (especially due to current A-fib w/ RVR).  Please f/u w/ Med[facility]x Pharmacist for confirmation  - Med-Hx Pharmacist e-mailed; please f/u and complete Med-Rec

## 2025-01-15 NOTE — ED PROVIDER NOTE - OBJECTIVE STATEMENT
89-year-old female past medical history CHF previously on Entresto now on hydrochlorothiazide, metoprolol, Farxiga, Lasix presenting to emergency department for 1 month of shortness of breath, worse on exertion.  Patient states symptoms worsening since she changed medications from Entresto to hydrochlorothiazide.  Patient also endorsing fatigue, decreased p.o. intake, weakness over the last 2 weeks.  Patient is been following with her outpatient PCP and cardiologist, recommended evaluation ED for generalized decompensation.  Denies fever, diarrhea, abdominal pain, nausea, dysuria, hematuria.

## 2025-01-15 NOTE — ED ADULT NURSE NOTE - OBJECTIVE STATEMENT
88 y/o F AAOx4, ambulatory at baseline presenting to room 25. Pt coming to ER sent by provider for  CHF exacerbation. Pt denies chest pain, headache, dizziness at this time. Pt states that she lost 30 lbs over the last year. Pt states that at her cardiologists office she was saturating 89% on RA. Pt was placed on 3L NC at this time. Pt noted to be in rapid a fib at this time. MD at bedside. #20g placed to LAC. Awaiting MD orders.

## 2025-01-15 NOTE — H&P ADULT - ASSESSMENT
[ x ]  Lab studies personally reviewed  [ x ]  Radiology personally reviewed  [ x ]  Old records personally reviewed    89-year-old female, with past history significant for CHF, AS, Atrial fibrillation, HTN, HLD, Borderline DM, COPD, Ex-smoker, OP, PE, Radiculopathy, MDD, and Vitamin D deficiency, presented to the ED secondary to persistent shortness of breath.  Diagnosed with CHF Exacerbation in the ED.

## 2025-01-15 NOTE — H&P ADULT - PROBLEM SELECTOR PLAN 8
Patient endorses same, but no acute symptoms presently  TSH mildly elevated, but FT4 within acceptable range  Reports being on medications, but is unable to recall the name/s; f/u Med-Hx Pharmacist  - f/u vitamin D level, electrolytes, in the AM  - ensure optimal hydration  - evaluate for any signs of acute decompensation Patient endorses same, but no acute symptoms presently  TSH mildly elevated, but FT4 within acceptable range  Reports being on medications, but is unable to recall the name/s; f/u Med-Hx Pharmacist  - f/u vitamin D level in the AM (chart review notes history of deficiency state)  - f/u electrolytes, in the AM  - ensure optimal hydration  - evaluate for any signs of acute decompensation Reports decreasing vision of bilaterally  Has had cataract surgery  Chart review notes history of posterior vitreous detachment bilaterally  Has not followed up with an ophthalmologist in a long time  - Would benefit from ophthalmology referral upon discharge

## 2025-01-15 NOTE — H&P ADULT - PROBLEM SELECTOR PLAN 9
Reports decreasing vision of bilaterally  Has had cataract surgery  Chart review notes history of posterior vitreous detachment bilaterally  Has not followed up with an ophthalmologist in a long time  - Would benefit from ophthalmology referral upon discharge Borderline, per remote history; unclear if advanced since then  Glucose = 131 on CMP.  However, has glucosuria (500)  - prescribing consistent carb diet  - f/u A1c level in the AM  - if A1c level elevated, would start ISS, per FS

## 2025-01-15 NOTE — ED ADULT NURSE NOTE - CHIEF COMPLAINT QUOTE
Pt from MD office for hypoxia. Pt unable to walk starting 2 weeks ago, now using wheelchair. Son also states he had lost about 20 pounds in the past month. Pt denies chest pain, dizziness, endorses SOB. Sating 92% on RA, placed on 2l NC. Phx of b/l pleural effusion, CHF, HTN, HLD, PE.  Pt noted to be tachy to 139 on EKG

## 2025-01-15 NOTE — H&P ADULT - PROBLEM SELECTOR PLAN 6
Patient unable to provide medication list  No recent information in [facility] (last admitted in 2023)  Patient confirms taking AC - Last dose recorded in records = 2.5 mg Q12H.  Prescribed overnight, and will continue same (especially due to current A-fib w/ RVR).  Please f/u w/ [facility] Pharmacist for confirmation  - Med-Hx Pharmacist e-mailed; please f/u and complete Med-Rec Reports weight loss of ~ 25 lbs over the past 2 years, despite an excellent appetite  Patient appears malnourished/cachectic  Albumin = 3.7, but appears to be in a hemoconcentrated sample  TSH more indicative of hypothyroid state (but FT4 within acceptable range)  Reports last colonoscopy several years ago  Possibility of hypermetabolism  - could get calorie count  - may benefit from GI eval

## 2025-01-15 NOTE — H&P ADULT - HISTORY OF PRESENT ILLNESS
89-year-old female, with past history significant for CHF, AS, Atrial fibrillation, HTN, HLD, Borderline DM, COPD, Ex-smoker, OP, PE, Radiculopathy, MDD, and Vitamin D deficiency, presented to the ED secondary to persistent shortness of breath.  Seen and evaluated at bedside;    Vital signs upon ED presentation as follows: BP = 103/67, HR = 110 (to max 139), RR = 20, T = 36.8 C (98.3 F), O2 Sat = =92% on RA (to 100% on RA).  Diagnosed with CHF Exacerbation and prescribed furosemide 40 mg IV x one, metoprolol 12.5 mg and initially, NaCl 500 mL in the ED. 89-year-old female, with past history significant for CHF, AS, Atrial fibrillation, HTN, HLD, Borderline DM, COPD, Ex-smoker, OP, PE, Radiculopathy, MDD, and Vitamin D deficiency, presented to the ED secondary to persistent shortness of breath.  Seen and evaluated at bedside; asleep with L-eye opened.  NAD.  Wakens easily when name is called.  Patient comments on having issues with her eyes.  Reports coming to the ED because of persistent shortness of breath, worsening with exertion, and associated with palpitations and diaphoresis for the past approximately one month.  No chest pain.  No edema of the lower extremities.  Consumes approximately 2 liters of fluids total daily.  Uses 2 to 3 pillows nightly.  Recalls onset of shortness of breath occurring since medication change; per documentation, Entresto was changed to HCTZ.  Patient went to her PCP today and was subsequently sent to the ED for evaluation.    In addition to the above, patient cites constipation for the past at least 3 days - not resolved with Ex-Lax taken 2 days ago.  Also comments on unexplained weight loss (~ 25 lbs over the past ~ 2 years), despite a very good appetite.  Endorses anxiety and depression, but without any acute issues presently.    Vital signs upon ED presentation as follows: BP = 103/67, HR = 110 (to max 139), RR = 20, T = 36.8 C (98.3 F), O2 Sat = =92% on RA (to 100% on RA).  Diagnosed with CHF Exacerbation and prescribed furosemide 40 mg IV x one, metoprolol 12.5 mg and initially, NaCl 500 mL in the ED.

## 2025-01-15 NOTE — ED PROVIDER NOTE - PHYSICAL EXAMINATION
Physical Exam:  Gen: NAD, AOx3, non-toxic appearing, able to ambulate without assistance  Head: NCAT  HEENT: EOMI, PEERLA, normal conjunctiva, tongue midline, oral mucosa moist  Lung: rales b/l lower lungs, no increased wob, no accessory muscle use   CV: RRR, no murmurs, rubs or gallops  Abd: soft, NT, ND, no guarding, no rigidity, no rebound tenderness, no CVA tenderness   MSK: no visible deformities, ROM normal in UE/LE, no back pain  Neuro: No focal sensory or motor deficits  Skin: Warm, well perfused, no rash, no leg swelling  Psych: normal affect, calm

## 2025-01-15 NOTE — H&P ADULT - NSHPSOCIALHISTORY_GEN_ALL_CORE
SOCIAL HISTORY:    Marital Status:  (  )    (  ) Single        (  )        (  )        (  )   Lives with:          (  ) Alone      (  ) Spouse     (  ) Children         (  ) Parents             (  ) Other    No history of smoking  No history of alcohol abuse  No history of illegal drug use    Occupation: SOCIAL HISTORY:    Marital Status:  ( x )    (  ) Single        (  )        (  )        (  )   Lives with:          (  ) Alone      ( x ) Spouse     (  ) Children         (  ) Parents             (  ) Other    History of smoking; quit at age 54 years old, after smoking ~ 2 to 3 packs daily since ~ age 14 years old  No history of alcohol abuse  No history of illegal drug use    Occupation:

## 2025-01-15 NOTE — H&P ADULT - NSHPREVIEWOFSYSTEMS_GEN_ALL_CORE
REVIEW OF SYSTEMS:    CONSTITUTIONAL: Some weakness/fatigue.  Unintentional weight loss.  Diaphoresis accompanying shortness of breath and palpitations.  No fever, chills or sweating  EYES/ENT: No visual changes.  No dysphagia  NECK: No pain or stiffness  RESPIRATORY: (+) shortness of breath.  No cough or hemoptysis  CARDIOVASCULAR: (+) palpitations.  No chest pain.  No lower extremity edema  GASTROINTESTINAL:  (+) constipation; no BM x at least 3 days.  No epigastric or abdominal pain. No nausea, vomiting or hematemesis.  No diarrhea.  No melena or hematochezia.  GENITOURINARY: No dysuria, frequency or hematuria  MUSCULOSKELETAL: No joint pain, swelling, decreased ROM, erythema, warmth  NEUROLOGICAL: No numbness or weakness  PSYCHIATRY: (+) anxiety and depression.  SKIN: No itching, burning, rashes, or lesions   All other review of systems is negative unless indicated above.

## 2025-01-15 NOTE — H&P ADULT - PROBLEM SELECTOR PLAN 7
Reports weight loss of ~ 25 lbs over the past 2 years, despite an excellent appetite  Patient appears malnourished/cachectic  Albumin = 3.7, but appears to be in a hemoconcentrated sample  TSH more indicative of hypothyroid state (but FT4 within acceptable range)  Reports last colonoscopy several years ago  Possibility of hypermetabolism  - could get calorie count  - may benefit from GI eval Patient endorses same, but no acute symptoms presently  TSH mildly elevated, but FT4 within acceptable range  Reports being on medications, but is unable to recall the name/s; f/u Med-[facility]x Pharmacist  - f/u vitamin D level in the AM (chart review notes history of deficiency state)  - f/u electrolytes, in the AM  - ensure optimal hydration  - evaluate for any signs of acute decompensation

## 2025-01-15 NOTE — ED ADULT NURSE NOTE - DOES PATIENT HAVE ADVANCE DIRECTIVE
[facility] [facility]        NAME: Joseph Rodríguez is a 43 y o  male  : 1979    MRN: [id number]  DATE: 2021  TIME: 8:13 PM    Pulse 100   Temp 100 °F (37 8 °C)   Resp 20   Ht 5' 10" (1 778 m)   Wt (!) 163 kg (360 lb)   SpO2 97%   BMI 51 65 kg/m²     Assessment and Plan   Encounter for laboratory testing for COVID-19 virus [Z20 822]  1  Encounter for laboratory testing for COVID-19 virus  POCT rapid strepA    Novel Coronavirus (Covid-19),PCR [facility] - Office Collection    Throat culture   2  Acute sinusitis, recurrence not specified, unspecified location     3  Bilateral impacted cerumen     4  Bilateral otitis media, unspecified otitis media type  azithromycin (ZITHROMAX) 250 mg tablet         Patient Instructions       Follow up with PCP in 3-5 days  Proceed to  ER if symptoms worsen  Chief Complaint     Chief Complaint   Patient presents with    Sore Throat     Patient states he started last Thursday with sore throat, congestion and it has gotten worse,  He states it is moving into his cheat and he is having some SOB but no fever         History of Present Illness       Pt with cough and congestion and ear blocked for several days, pt is covid vaccinated     Sore Throat   Associated symptoms include congestion, coughing and ear pain  Review of Systems   Review of Systems   Constitutional: Negative  HENT: Positive for congestion, ear pain and sore throat  Eyes: Negative  Respiratory: Positive for cough  Cardiovascular: Negative  Gastrointestinal: Negative  Endocrine: Negative  Genitourinary: Negative  Musculoskeletal: Negative  Skin: Negative  Allergic/Immunologic: Negative  Hematological: Negative  Psychiatric/Behavioral: Negative  All other systems reviewed and are negative          Current Medications       Current Outpatient Medications:     Edarbyclor 40-25 MG TABS, TAKE ONE TABLET BY MOUTH ONCE DAILY , Disp: 30 tablet, Rfl: 5   metoprolol tartrate (LOPRESSOR) 50 mg tablet, Take 1 tablet (50 mg total) by mouth every 12 (twelve) hours, Disp: 180 tablet, Rfl: 0    sertraline (ZOLOFT) 50 mg tablet, TAKE ONE TABLET BY MOUTH ONCE DAILY , Disp: 90 tablet, Rfl: 1    ALPRAZolam (XANAX) 0 25 mg tablet, Take 1 tablet (0 25 mg total) by mouth every 12 (twelve) hours as needed for anxiety (Patient not taking: Reported on 1/23/2019 ), Disp: 30 tablet, Rfl: 0    azithromycin (ZITHROMAX) 250 mg tablet, Take 2 tablets today then 1 tablet daily x 4 days, Disp: 6 tablet, Rfl: 0    Current Allergies     Allergies as of 09/20/2021    (No Known Allergies)            The following portions of the patient's history were reviewed and updated as appropriate: allergies, current medications, past family history, past medical history, past social history, past surgical history and problem list      Past Medical History:   Diagnosis Date    Anxiety     Depression        History reviewed  No pertinent surgical history  Family History   Problem Relation Age of Onset    Heart attack Mother     Diabetes Mother     Coronary artery disease Father     Diabetes Father     Hyperlipidemia Father     Aortic aneurysm Father     Ovarian cancer Maternal Grandmother     Aortic aneurysm Paternal Uncle          Medications have been verified  Objective   Pulse 100   Temp 100 °F (37 8 °C)   Resp 20   Ht 5' 10" (1 778 m)   Wt (!) 163 kg (360 lb)   SpO2 97%   BMI 51 65 kg/m²        Physical Exam     Physical Exam  Vitals and nursing note reviewed  Constitutional:       Appearance: Normal appearance  He is normal weight  HENT:      Head: Normocephalic and atraumatic  Ears:      Comments: bilat ear cermumen impaction  +success with bilat warm water lavage, post lavage hearing wnl  Canals wnl no swelling no erythema      Nose:      Comments: Boggy mucosa      Mouth/Throat:      Mouth: Mucous membranes are moist       Pharynx: Oropharynx is clear  Posterior oropharyngeal erythema present  Eyes:      Extraocular Movements: Extraocular movements intact  Conjunctiva/sclera: Conjunctivae normal       Pupils: Pupils are equal, round, and reactive to light  Cardiovascular:      Rate and Rhythm: Normal rate and regular rhythm  Pulses: Normal pulses  Heart sounds: Normal heart sounds  Pulmonary:      Effort: Pulmonary effort is normal       Comments: Minor coarse sounds   Abdominal:      General: Abdomen is flat  Bowel sounds are normal       Palpations: Abdomen is soft  Musculoskeletal:         General: Normal range of motion  Cervical back: Normal range of motion and neck supple  Skin:     Capillary Refill: Capillary refill takes less than 2 seconds  Neurological:      General: No focal deficit present  Mental Status: He is alert and oriented to person, place, and time     Psychiatric:         Mood and Affect: Mood normal          Behavior: Behavior normal  No

## 2025-01-15 NOTE — ED PROVIDER NOTE - CLINICAL SUMMARY MEDICAL DECISION MAKING FREE TEXT BOX
89-year-old female past medical history CHF previously on Entresto now on hydrochlorothiazide, metoprolol, Farxiga, Lasix presenting to emergency department for 1 month of shortness of breath, worse on exertion. On arrival to ED tachycardic, nonfebrile, normotensive, saturation 96% on RA. ECG indicates a fib w/ RVR.

## 2025-01-16 ENCOUNTER — RESULT REVIEW (OUTPATIENT)
Age: 89
End: 2025-01-16

## 2025-01-16 DIAGNOSIS — Z29.9 ENCOUNTER FOR PROPHYLACTIC MEASURES, UNSPECIFIED: ICD-10-CM

## 2025-01-16 DIAGNOSIS — H54.3 UNQUALIFIED VISUAL LOSS, BOTH EYES: ICD-10-CM

## 2025-01-16 DIAGNOSIS — R63.8 OTHER SYMPTOMS AND SIGNS CONCERNING FOOD AND FLUID INTAKE: ICD-10-CM

## 2025-01-16 DIAGNOSIS — K59.01 SLOW TRANSIT CONSTIPATION: ICD-10-CM

## 2025-01-16 DIAGNOSIS — K59.00 CONSTIPATION, UNSPECIFIED: ICD-10-CM

## 2025-01-16 DIAGNOSIS — E11.9 TYPE 2 DIABETES MELLITUS WITHOUT COMPLICATIONS: ICD-10-CM

## 2025-01-16 DIAGNOSIS — N17.9 ACUTE KIDNEY FAILURE, UNSPECIFIED: ICD-10-CM

## 2025-01-16 DIAGNOSIS — Z79.899 OTHER LONG TERM (CURRENT) DRUG THERAPY: ICD-10-CM

## 2025-01-16 DIAGNOSIS — R73.03 PREDIABETES: ICD-10-CM

## 2025-01-16 DIAGNOSIS — J44.9 CHRONIC OBSTRUCTIVE PULMONARY DISEASE, UNSPECIFIED: ICD-10-CM

## 2025-01-16 DIAGNOSIS — R06.02 SHORTNESS OF BREATH: ICD-10-CM

## 2025-01-16 DIAGNOSIS — R63.4 ABNORMAL WEIGHT LOSS: ICD-10-CM

## 2025-01-16 DIAGNOSIS — I50.9 HEART FAILURE, UNSPECIFIED: ICD-10-CM

## 2025-01-16 DIAGNOSIS — I10 ESSENTIAL (PRIMARY) HYPERTENSION: ICD-10-CM

## 2025-01-16 DIAGNOSIS — K21.9 GASTRO-ESOPHAGEAL REFLUX DISEASE WITHOUT ESOPHAGITIS: ICD-10-CM

## 2025-01-16 DIAGNOSIS — E78.5 HYPERLIPIDEMIA, UNSPECIFIED: ICD-10-CM

## 2025-01-16 DIAGNOSIS — I48.91 UNSPECIFIED ATRIAL FIBRILLATION: ICD-10-CM

## 2025-01-16 LAB
24R-OH-CALCIDIOL SERPL-MCNC: 23.9 NG/ML — LOW (ref 30–80)
A1C WITH ESTIMATED AVERAGE GLUCOSE RESULT: 7.4 % — HIGH (ref 4–5.6)
ADD ON TEST-SPECIMEN IN LAB: SIGNIFICANT CHANGE UP
ADD ON TEST-SPECIMEN IN LAB: SIGNIFICANT CHANGE UP
ANION GAP SERPL CALC-SCNC: 14 MMOL/L — SIGNIFICANT CHANGE UP (ref 7–14)
BLOOD GAS VENOUS COMPREHENSIVE RESULT: SIGNIFICANT CHANGE UP
BUN SERPL-MCNC: 47 MG/DL — HIGH (ref 7–23)
CALCIUM SERPL-MCNC: 9 MG/DL — SIGNIFICANT CHANGE UP (ref 8.4–10.5)
CHLORIDE SERPL-SCNC: 103 MMOL/L — SIGNIFICANT CHANGE UP (ref 98–107)
CHOLEST SERPL-MCNC: 183 MG/DL — SIGNIFICANT CHANGE UP
CO2 SERPL-SCNC: 24 MMOL/L — SIGNIFICANT CHANGE UP (ref 22–31)
CREAT SERPL-MCNC: 1.54 MG/DL — HIGH (ref 0.5–1.3)
CULTURE RESULTS: NO GROWTH — SIGNIFICANT CHANGE UP
EGFR: 32 ML/MIN/1.73M2 — LOW
ESTIMATED AVERAGE GLUCOSE: 166 — SIGNIFICANT CHANGE UP
GLUCOSE BLDC GLUCOMTR-MCNC: 152 MG/DL — HIGH (ref 70–99)
GLUCOSE SERPL-MCNC: 141 MG/DL — HIGH (ref 70–99)
HDLC SERPL-MCNC: 40 MG/DL — LOW
LIPID PNL WITH DIRECT LDL SERPL: 123 MG/DL — HIGH
MAGNESIUM SERPL-MCNC: 2.2 MG/DL — SIGNIFICANT CHANGE UP (ref 1.6–2.6)
NON HDL CHOLESTEROL: 143 MG/DL — HIGH
PHOSPHATE SERPL-MCNC: 4.1 MG/DL — SIGNIFICANT CHANGE UP (ref 2.5–4.5)
POTASSIUM SERPL-MCNC: 4.4 MMOL/L — SIGNIFICANT CHANGE UP (ref 3.5–5.3)
POTASSIUM SERPL-SCNC: 4.4 MMOL/L — SIGNIFICANT CHANGE UP (ref 3.5–5.3)
PREALB SERPL-MCNC: 20 MG/DL — SIGNIFICANT CHANGE UP (ref 20–40)
PROCALCITONIN SERPL-MCNC: 0.12 NG/ML — HIGH (ref 0.02–0.1)
SODIUM SERPL-SCNC: 141 MMOL/L — SIGNIFICANT CHANGE UP (ref 135–145)
SPECIMEN SOURCE: SIGNIFICANT CHANGE UP
T4 FREE SERPL-MCNC: 1.3 NG/DL — SIGNIFICANT CHANGE UP (ref 0.9–1.7)
TRIGL SERPL-MCNC: 108 MG/DL — SIGNIFICANT CHANGE UP
TSH SERPL-MCNC: 4.96 UIU/ML — HIGH (ref 0.27–4.2)

## 2025-01-16 PROCEDURE — 74018 RADEX ABDOMEN 1 VIEW: CPT | Mod: 26

## 2025-01-16 PROCEDURE — 99232 SBSQ HOSP IP/OBS MODERATE 35: CPT

## 2025-01-16 PROCEDURE — 93306 TTE W/DOPPLER COMPLETE: CPT | Mod: 26

## 2025-01-16 PROCEDURE — 99223 1ST HOSP IP/OBS HIGH 75: CPT

## 2025-01-16 PROCEDURE — 99233 SBSQ HOSP IP/OBS HIGH 50: CPT

## 2025-01-16 RX ORDER — POLYETHYLENE GLYCOL 3350 17 G/17G
17 POWDER, FOR SOLUTION ORAL DAILY
Refills: 0 | Status: DISCONTINUED | OUTPATIENT
Start: 2025-01-16 | End: 2025-01-22

## 2025-01-16 RX ORDER — LACTULOSE 10 G/15 ML
20 SOLUTION, ORAL ORAL ONCE
Refills: 0 | Status: COMPLETED | OUTPATIENT
Start: 2025-01-16 | End: 2025-01-16

## 2025-01-16 RX ORDER — DM/PSEUDOEPHED/ACETAMINOPHEN 10-30-250
25 CAPSULE ORAL ONCE
Refills: 0 | Status: DISCONTINUED | OUTPATIENT
Start: 2025-01-16 | End: 2025-01-22

## 2025-01-16 RX ORDER — APIXABAN 5 MG/1
2.5 TABLET, FILM COATED ORAL EVERY 12 HOURS
Refills: 0 | Status: DISCONTINUED | OUTPATIENT
Start: 2025-01-16 | End: 2025-01-22

## 2025-01-16 RX ORDER — METOPROLOL SUCCINATE 25 MG
25 TABLET, EXTENDED RELEASE 24 HR ORAL
Refills: 0 | Status: DISCONTINUED | OUTPATIENT
Start: 2025-01-16 | End: 2025-01-18

## 2025-01-16 RX ORDER — METOPROLOL SUCCINATE 25 MG
1 TABLET, EXTENDED RELEASE 24 HR ORAL
Refills: 0 | DISCHARGE

## 2025-01-16 RX ORDER — SENNOSIDES 8.6 MG
2 TABLET ORAL AT BEDTIME
Refills: 0 | Status: DISCONTINUED | OUTPATIENT
Start: 2025-01-16 | End: 2025-01-21

## 2025-01-16 RX ORDER — SODIUM CHLORIDE 9 G/ML
1000 INJECTION, SOLUTION INTRAVENOUS
Refills: 0 | Status: DISCONTINUED | OUTPATIENT
Start: 2025-01-16 | End: 2025-01-22

## 2025-01-16 RX ORDER — DAPAGLIFLOZIN 5 MG/1
1 TABLET, FILM COATED ORAL
Refills: 0 | DISCHARGE

## 2025-01-16 RX ORDER — DM/PSEUDOEPHED/ACETAMINOPHEN 10-30-250
15 CAPSULE ORAL ONCE
Refills: 0 | Status: DISCONTINUED | OUTPATIENT
Start: 2025-01-16 | End: 2025-01-22

## 2025-01-16 RX ORDER — BACTERIOSTATIC SODIUM CHLORIDE 0.9 %
500 VIAL (ML) INJECTION ONCE
Refills: 0 | Status: DISCONTINUED | OUTPATIENT
Start: 2025-01-16 | End: 2025-01-16

## 2025-01-16 RX ORDER — DM/PSEUDOEPHED/ACETAMINOPHEN 10-30-250
12.5 CAPSULE ORAL ONCE
Refills: 0 | Status: DISCONTINUED | OUTPATIENT
Start: 2025-01-16 | End: 2025-01-22

## 2025-01-16 RX ORDER — BUPROPION HYDROCHLORIDE 150 MG/1
150 TABLET, EXTENDED RELEASE ORAL DAILY
Refills: 0 | Status: DISCONTINUED | OUTPATIENT
Start: 2025-01-16 | End: 2025-01-22

## 2025-01-16 RX ORDER — ASPIRIN 81 MG/1
81 TABLET, COATED ORAL DAILY
Refills: 0 | Status: DISCONTINUED | OUTPATIENT
Start: 2025-01-16 | End: 2025-01-22

## 2025-01-16 RX ORDER — PAROXETINE HYDROCHLORIDE 10 MG/1
1 TABLET, FILM COATED ORAL
Refills: 0 | DISCHARGE

## 2025-01-16 RX ORDER — INSULIN LISPRO 100/ML
VIAL (ML) SUBCUTANEOUS AT BEDTIME
Refills: 0 | Status: DISCONTINUED | OUTPATIENT
Start: 2025-01-16 | End: 2025-01-22

## 2025-01-16 RX ORDER — BISACODYL 5 MG
10 TABLET, DELAYED RELEASE (ENTERIC COATED) ORAL AT BEDTIME
Refills: 0 | Status: COMPLETED | OUTPATIENT
Start: 2025-01-16 | End: 2025-01-17

## 2025-01-16 RX ORDER — TRAMADOL HYDROCHLORIDE 100 MG/1
1 TABLET, EXTENDED RELEASE ORAL
Refills: 0 | DISCHARGE

## 2025-01-16 RX ORDER — GLUCAGON 3 MG/1
1 POWDER NASAL ONCE
Refills: 0 | Status: DISCONTINUED | OUTPATIENT
Start: 2025-01-16 | End: 2025-01-22

## 2025-01-16 RX ORDER — SODIUM CHLORIDE 9 G/ML
500 INJECTION, SOLUTION INTRAVENOUS ONCE
Refills: 0 | Status: COMPLETED | OUTPATIENT
Start: 2025-01-16 | End: 2025-01-16

## 2025-01-16 RX ORDER — BISACODYL 5 MG
5 TABLET, DELAYED RELEASE (ENTERIC COATED) ORAL DAILY
Refills: 0 | Status: DISCONTINUED | OUTPATIENT
Start: 2025-01-16 | End: 2025-01-21

## 2025-01-16 RX ORDER — BACTERIOSTATIC SODIUM CHLORIDE 0.9 %
250 VIAL (ML) INJECTION ONCE
Refills: 0 | Status: COMPLETED | OUTPATIENT
Start: 2025-01-16 | End: 2025-01-16

## 2025-01-16 RX ORDER — ATORVASTATIN CALCIUM 80 MG/1
1 TABLET, FILM COATED ORAL
Refills: 0 | DISCHARGE

## 2025-01-16 RX ORDER — PANTOPRAZOLE 20 MG/1
40 TABLET, DELAYED RELEASE ORAL
Refills: 0 | Status: DISCONTINUED | OUTPATIENT
Start: 2025-01-16 | End: 2025-01-22

## 2025-01-16 RX ORDER — INSULIN LISPRO 100/ML
VIAL (ML) SUBCUTANEOUS
Refills: 0 | Status: DISCONTINUED | OUTPATIENT
Start: 2025-01-16 | End: 2025-01-22

## 2025-01-16 RX ORDER — GABAPENTIN 800 MG/1
400 TABLET ORAL
Refills: 0 | Status: DISCONTINUED | OUTPATIENT
Start: 2025-01-16 | End: 2025-01-22

## 2025-01-16 RX ORDER — PAROXETINE HYDROCHLORIDE 10 MG/1
10 TABLET, FILM COATED ORAL DAILY
Refills: 0 | Status: DISCONTINUED | OUTPATIENT
Start: 2025-01-16 | End: 2025-01-22

## 2025-01-16 RX ADMIN — APIXABAN 2.5 MILLIGRAM(S): 5 TABLET, FILM COATED ORAL at 20:11

## 2025-01-16 RX ADMIN — Medication 25 MILLIGRAM(S): at 20:11

## 2025-01-16 RX ADMIN — Medication 5 MILLIGRAM(S): at 08:37

## 2025-01-16 RX ADMIN — APIXABAN 2.5 MILLIGRAM(S): 5 TABLET, FILM COATED ORAL at 06:57

## 2025-01-16 RX ADMIN — SODIUM CHLORIDE 500 MILLILITER(S): 9 INJECTION, SOLUTION INTRAVENOUS at 10:29

## 2025-01-16 RX ADMIN — Medication 20 GRAM(S): at 16:02

## 2025-01-16 RX ADMIN — Medication 1 APPLICATION(S): at 08:35

## 2025-01-16 RX ADMIN — Medication 500 MILLILITER(S): at 01:35

## 2025-01-16 RX ADMIN — Medication 250 MICROGRAM(S): at 11:51

## 2025-01-16 RX ADMIN — GABAPENTIN 400 MILLIGRAM(S): 800 TABLET ORAL at 20:11

## 2025-01-16 RX ADMIN — Medication 250 MICROGRAM(S): at 08:25

## 2025-01-16 NOTE — CONSULT NOTE ADULT - ATTENDING COMMENTS
appears euvolemic on exam  resume GDMT as tolerated and when SCr improves  afib care as per EP
88 y/o woman with chronic AF (Eliquis) and HFpEF p/w AF w/RVR. Rate control with metoprolol. Would avoid chronic digoxin. Continue Eliquis for anticoagulation.

## 2025-01-16 NOTE — PROGRESS NOTE ADULT - PROBLEM SELECTOR PLAN 1
- pt p/w SOB, difficulty ambulating, and hypoxia, referred from PCP to ER for evaluation  - chart review shows pt f/w cards Dr. Perdue and recently changed entresto to spironolactone d/t cost issues  - home meds: farxiga 10mg daily, lasix 20mg T/R/Sat and 40mg MWF/Sun, toprol 25mg daily, spironolactone 25mg daily  - compliant with meds  - on arrival, a-fib RVR and requiring 3L NC -- now rate controlled and on RA  - Cr elevated as below  - TSH WNL  - BNP 5092  - UA neg, COV/flu/RSV neg  - CXR c/w CHF  - TTE with EF 71%, LA sev dil, and mod MV stenosis  - cards consulted, f/u recs  - s/p lasix 40mg IV --> f/u cards recs for further dosing  - strict I&O, daily weights, and monitor on tele

## 2025-01-16 NOTE — CONSULT NOTE ADULT - ASSESSMENT
89-year-old female, with past history significant for HFpEF, moderate MS, moderate MR,  Atrial fibrillation on Eliquis, HTN, HLD, DM, COPD, Ex-smoker, prior PE, MDD, presented to the ED secondary to persistent shortness of breath. She was found to be in AF w/ RVR, now rate controlled after IVF and initiating dig/metoprolol.      CHADSVASC 5-6  Dose reduced Eliquis (Age >80, weight < 60 kg, and Cr >1.5) 89-year-old female, with past history significant for HFpEF, moderate MS, moderate MR,  Atrial fibrillation on Eliquis, HTN, HLD, DM, COPD, Ex-smoker, prior PE, MDD, presented to the ED secondary to persistent shortness of breath. She was found to be in AF w/ RVR, now rate controlled after IVF and initiating dig/metoprolol.      CHADSVASC 5-6  Dose reduced Eliquis (Age >80, weight < 60 kg, and Cr >1.5)    Rec  - Lopressor 25 mg BID  - Eliquis 2.5 mg BID    Please see attending attestation for final recommendations        Bob Mccormick MD  Cardiology Fellow     All Cardiology service information can be found 24/7 on [email or web address], password: [email or web address] 89-year-old female, with past history significant for HFpEF, moderate MS, moderate MR,  Atrial fibrillation on Eliquis, HTN, HLD, DM, COPD, Ex-smoker, prior PE, MDD, presented to the ED secondary to persistent shortness of breath. She was found to be in AF w/ RVR, now rate controlled, remains HDS.      CHADSVASC 5-6  Dose reduced Eliquis (Age >80, weight < 60 kg, and Cr >1.5)    Rec  - Lopressor 25 mg BID - up-titrate as needed for HR <110  - Eliquis 2.5 mg BID  - No indication for digoxin at this time    Please see attending attestation for final recommendations        Bob Mccormick MD  Cardiology Fellow     All Cardiology service information can be found 24/7 on [email or web address], password: [email or web address]

## 2025-01-16 NOTE — CONSULT NOTE ADULT - SUBJECTIVE AND OBJECTIVE BOX
Patient seen and evaluated at bedside      HPI:  89-year-old female, with past history significant for HFpEF, moderate MS, moderate MR,  Atrial fibrillation on Eliquis, HTN, HLD, DM, COPD, Ex-smoker, prior PE, MDD, presented to the ED secondary to persistent shortness of breath. She was found to be in AF w/ RVR for which EP is consulted. She reportedly had a low BP and so was started on digoxin for rate control with improvement.  Currently she is on dig and metoprolol in rate controlled AF. She has no CP, SOB, or palpitations at time of exam.         PMHx:   Other intervertebral disc displacement, lumbar region    Radiculopathy    Borderline diabetes    HLD (hyperlipidemia)    Acid reflux    Abnormal echocardiogram    Poor historian    Shingles outbreak    Bleeding disorder    Aortic stenosis    Osteoporosis    Ankle fracture    Former smoker    History of pulmonary embolism    History of smoking    COPD with asthma    Atrial fibrillation    Major depressive disorder    Vitamin D deficiency        PSHx:   H/O laminectomy    History of partial gastrectomy    S/P appendectomy    S/P bilateral cataract extraction        Allergies:  contrast media (gadolinium-based) (Other)  penicillin (Rash)  Keflex (Unknown)  Xanax (Other)      Current Medications:   acetaminophen     Tablet .. 650 milliGRAM(s) Oral every 6 hours PRN  apixaban 2.5 milliGRAM(s) Oral every 12 hours  aspirin enteric coated 81 milliGRAM(s) Oral daily  bacitracin   Ointment 1 Application(s) Topical two times a day  bisacodyl 5 milliGRAM(s) Oral daily PRN  bisacodyl Suppository 10 milliGRAM(s) Rectal at bedtime  buPROPion XL (24-Hour) . 150 milliGRAM(s) Oral daily  gabapentin 400 milliGRAM(s) Oral two times a day  lactulose Syrup 20 Gram(s) Oral once  pantoprazole    Tablet 40 milliGRAM(s) Oral before breakfast  PARoxetine 10 milliGRAM(s) Oral daily  polyethylene glycol 3350 17 Gram(s) Oral daily  senna 2 Tablet(s) Oral at bedtime      FAMILY HISTORY:  Family history of heart disease (Sibling)    Family history of breast cancer in mother    Family history of colon cancer (Sibling)      REVIEW OF SYSTEMS:  All other review of systems is negative unless indicated above.    Physical Exam:  T(F): 98 (-16), Max: 98.2 (-15)  HR: 120 (-16) (69 - 140)  BP: 125/78 () (90/74 - 125/78)  RR: 16 ()  SpO2: 97% ()  Appearance: No acute distress;  Eyes:  EOMI  HEENT: Normal oral mucosa  Cardiovascular: IRR, S1, S2, trace edema  Respiratory: nml resp effort   Gastrointestinal: soft, non-tender, non-distended  Extremities: trace lower extremity edema   Neurologic: Non-focal  Psychiatry: AAOx3, mood & affect appropriate    Cardiovascular Diagnostic Testing:    ECG:   AF RVR    Echo:   CONCLUSIONS:      1. Left ventricular systolic function is hyperdynamic with an ejection fraction of 71 % by King's method of disks.   2. Normal right ventricular systolic function.   3. Left atrium is severely dilated.   4. Compared to the transthoracic echocardiogram performed on 2023, the previously noted severe mitral regurgitation is no longer present. The systolic blood pressure is 15 mm Hg lower on this study.   5. Moderate mitral valve stenosis.        Labs: Personally reviewed                        15.3   7.12  )-----------( 286      ( 15 Tashi 2025 17:00 )             47.2         141  |  103  |  47[H]  ----------------------------<  141[H]  4.4   |  24  |  1.54[H]    Ca    9.0      2025 06:27  Phos  4.1       Mg     2.20         TPro  6.7  /  Alb  3.7  /  TBili  0.5  /  DBili  x   /  AST  19  /  ALT  21  /  AlkPhos  91  01-15    PT/INR - ( 15 Tashi 2025 17:00 )   PT: 17.7 sec;   INR: 1.53 ratio         PTT - ( 15 Tashi 2025 17:00 )  PTT:43.5 sec      Total Cholesterol: 183  LDL: --  HDL: 40  T      Thyroid Stimulating Hormone, Serum: 4.96 uIU/mL (01-15 @ 17:00)    
Cardiology Consult Note   [Please check [email or web address] password: [email or web address]" for cardiology service schedule and contact information]    HPI:  89-year-old female, with past history significant for CHF, AS, Atrial fibrillation, HTN, HLD, Borderline DM, COPD, Ex-smoker, OP, PE, Radiculopathy, MDD, and Vitamin D deficiency, presented to the ED secondary to persistent shortness of breath.  Seen and evaluated at bedside; asleep with L-eye opened.  NAD.  Wakens easily when name is called.  Patient comments on having issues with her eyes.  Reports coming to the ED because of persistent shortness of breath, worsening with exertion, and associated with palpitations and diaphoresis for the past approximately one month.  No chest pain.  No edema of the lower extremities.  Consumes approximately 2 liters of fluids total daily.  Uses 2 to 3 pillows nightly.  Recalls onset of shortness of breath occurring since medication change; per documentation, Entresto was changed to HCTZ.  Patient went to her PCP today and was subsequently sent to the ED for evaluation.    In addition to the above, patient cites constipation for the past at least 3 days - not resolved with Ex-Lax taken 2 days ago.  Also comments on unexplained weight loss (~ 25 lbs over the past ~ 2 years), despite a very good appetite.  Endorses anxiety and depression, but without any acute issues presently.    Vital signs upon ED presentation as follows: BP = 103/67, HR = 110 (to max 139), RR = 20, T = 36.8 C (98.3 F), O2 Sat = =92% on RA (to 100% on RA).  Diagnosed with CHF Exacerbation and prescribed furosemide 40 mg IV x one, metoprolol 12.5 mg and initially, NaCl 500 mL in the ED. (15 Tashi 2025 21:41)      PAST MEDICAL & SURGICAL HISTORY:  Other intervertebral disc displacement, lumbar region      Radiculopathy      Borderline diabetes      HLD (hyperlipidemia)      Acid reflux      Poor historian      Shingles outbreak  in 2005 2 months after back surgery      Aortic stenosis      Osteoporosis      Ankle fracture  left      Former smoker      History of pulmonary embolism  2017 after  back surgery- treated with AC  for 1 year - Ct chest done as per surgeon      COPD with asthma  not on any meds ,deneis any exacerbation      Atrial fibrillation      Major depressive disorder      Vitamin D deficiency      H/O laminectomy  april 2005 ,Fusion- 2017      History of partial gastrectomy  secondary to ulcer- 1996      S/P appendectomy  1996      S/P bilateral cataract extraction  10 years ago        FAMILY HISTORY:  Family history of heart disease (Sibling)    Family history of breast cancer in mother    Family history of colon cancer (Sibling)      SOCIAL HISTORY:  unchanged    MEDICATIONS:  apixaban 2.5 milliGRAM(s) Oral every 12 hours  aspirin enteric coated 81 milliGRAM(s) Oral daily  metoprolol tartrate 25 milliGRAM(s) Oral two times a day        acetaminophen     Tablet .. 650 milliGRAM(s) Oral every 6 hours PRN  buPROPion XL (24-Hour) . 150 milliGRAM(s) Oral daily  gabapentin 400 milliGRAM(s) Oral two times a day  PARoxetine 10 milliGRAM(s) Oral daily    bisacodyl 5 milliGRAM(s) Oral daily PRN  bisacodyl Suppository 10 milliGRAM(s) Rectal at bedtime  lactulose Syrup 20 Gram(s) Oral once  pantoprazole    Tablet 40 milliGRAM(s) Oral before breakfast  polyethylene glycol 3350 17 Gram(s) Oral daily  senna 2 Tablet(s) Oral at bedtime      bacitracin   Ointment 1 Application(s) Topical two times a day        -------------------------------------------------------------------------------------------  PHYSICAL EXAM:  T(C): 36.7 (01-16-25 @ 11:50), Max: 36.8 (01-15-25 @ 17:14)  HR: 120 (01-16-25 @ 11:50) (69 - 140)  BP: 125/78 (01-16-25 @ 11:50) (90/74 - 125/78)  RR: 16 (01-16-25 @ 11:50) (16 - 19)  SpO2: 97% (01-16-25 @ 11:50) (97% - 100%)  Wt(kg): --  I&O's Summary      GENERAL: NAD  HEENT: EMOI  CHEST/LUNG: Unlabored respirations.  HEART: IRR; No murmurs, rubs, or gallops.  EXTREMITIES:  no edema    -------------------------------------------------------------------------------------------  LABS:                          15.3   7.12  )-----------( 286      ( 15 Tashi 2025 17:00 )             47.2     01-16    141  |  103  |  47[H]  ----------------------------<  141[H]  4.4   |  24  |  1.54[H]    Ca    9.0      16 Jan 2025 06:27  Phos  4.1     01-16  Mg     2.20     01-16    TPro  6.7  /  Alb  3.7  /  TBili  0.5  /  DBili  x   /  AST  19  /  ALT  21  /  AlkPhos  91  01-15    PT/INR - ( 15 Tashi 2025 17:00 )   PT: 17.7 sec;   INR: 1.53 ratio         PTT - ( 15 Tashi 2025 17:00 )  PTT:43.5 sec          acetaminophen     Tablet .. 650 milliGRAM(s) Oral every 6 hours PRN  buPROPion XL (24-Hour) . 150 milliGRAM(s) Oral daily  gabapentin 400 milliGRAM(s) Oral two times a day  PARoxetine 10 milliGRAM(s) Oral daily      -------------------------------------------------------------------------------------------  Cardiovascular Diagnostic Testing:    ECG:     Echo:     Stress Testing:    Cath:    -------------------------------------------------------------------------------------------

## 2025-01-16 NOTE — PATIENT PROFILE ADULT - FALL HARM RISK - HARM RISK INTERVENTIONS

## 2025-01-16 NOTE — PHARMACOTHERAPY INTERVENTION NOTE - COMMENTS
Medication history is complete. Medication list updated in Outpatient Medication Record (OMR). Please call spectra [phone] if you have any questions.     Medication history obtained from patient's outpatient pharmacy ([facility]) as patient unable to recall entirety of list on interview.

## 2025-01-16 NOTE — PROGRESS NOTE ADULT - PROBLEM SELECTOR PLAN 2
- pt p/w a-fib -140s  - known hx a-fib on eliquis 2.5mg BID and toprol 25mg daily  - s/p digoxin and lopressor in ER --> now rate controlled 80-90s  - EP consulted, f/u recs  - start lopressor 25mg BID  - c/w home eliquis  - monitor on tele

## 2025-01-16 NOTE — PROGRESS NOTE ADULT - SUBJECTIVE AND OBJECTIVE BOX
[facility] Department of Hospital Medicine  Mecca Montoya DO  Available on MS Teams  Pager: [phone]    Patient is a 89y old  Female who presents with a chief complaint of Increasing shortness of breath (15 Tashi 2025 21:41)    Subjective:  Pt seen and examined at bedside sleeping, easily arousable. Says she is tired because she has not slept much. Otherwise currently denies complaints with exception of ongoing constipation; does not recall last BM.  Spoke with pt's  who plans to visit with their son shortly. Confirmed home meds and recent history. Agreeable with plan for cards/EP evaluation and PT eval. Appreciative of care being provided to pt.    VITAL SIGNS:  T(C): 36.7 (01-16-25 @ 11:50), Max: 36.8 (01-15-25 @ 17:14)  T(F): 98 (01-16-25 @ 11:50), Max: 98.2 (01-15-25 @ 17:14)  HR: 120 (01-16-25 @ 11:50) (69 - 140)  BP: 125/78 (01-16-25 @ 11:50) (90/74 - 125/78)  BP(mean): 81 (01-16-25 @ 09:11) (81 - 93)  RR: 16 (01-16-25 @ 11:50) (16 - 19)  SpO2: 97% (01-16-25 @ 11:50) (97% - 100%)  Wt(kg): --    PHYSICAL EXAM:  Constitutional: resting comfortably in bed; NAD; elderly frail F; appears tired  Head: NC/AT  Eyes: PERRL, EOMI, anicteric sclera  ENT: no nasal discharge; MMM  Neck: supple; no JVD  Respiratory: bibasilar crackles BL; comfortable on RA without respiratory distress  Cardiac: +S1/S2; irregularly irregular in 90s; no M/R/G  Gastrointestinal: soft, NT/ND; no rebound or guarding; +BSx4  Extremities: WWP, no clubbing or cyanosis; no peripheral edema  Musculoskeletal: NROM x4; no joint swelling, tenderness or erythema  Neurologic: AAOx3; CNII-XII grossly intact; no focal deficits  Psychiatric: affect and characteristics of appearance, verbalizations, behaviors are appropriate    MEDICATIONS  (STANDING):  apixaban 2.5 milliGRAM(s) Oral every 12 hours  aspirin enteric coated 81 milliGRAM(s) Oral daily  bacitracin   Ointment 1 Application(s) Topical two times a day  bisacodyl Suppository 10 milliGRAM(s) Rectal at bedtime  buPROPion XL (24-Hour) . 150 milliGRAM(s) Oral daily  gabapentin 400 milliGRAM(s) Oral two times a day  lactulose Syrup 20 Gram(s) Oral once  pantoprazole    Tablet 40 milliGRAM(s) Oral before breakfast  PARoxetine 10 milliGRAM(s) Oral daily  polyethylene glycol 3350 17 Gram(s) Oral daily  senna 2 Tablet(s) Oral at bedtime    MEDICATIONS  (PRN):  acetaminophen     Tablet .. 650 milliGRAM(s) Oral every 6 hours PRN Mild Pain (1 - 3)  bisacodyl 5 milliGRAM(s) Oral daily PRN Constipation    LABS:                        15.3   7.12  )-----------( 286      ( 15 Tashi 2025 17:00 )             47.2     01-16    141  |  103  |  47[H]  ----------------------------<  141[H]  4.4   |  24  |  1.54[H]    Ca    9.0      16 Jan 2025 06:27  Phos  4.1     01-16  Mg     2.20     01-16    TPro  6.7  /  Alb  3.7  /  TBili  0.5  /  DBili  x   /  AST  19  /  ALT  21  /  AlkPhos  91  01-15    PT/INR - ( 15 Tashi 2025 17:00 )   PT: 17.7 sec;   INR: 1.53 ratio         PTT - ( 15 Tashi 2025 17:00 )  PTT:43.5 sec  Urinalysis Basic - ( 16 Jan 2025 06:27 )    Color: x / Appearance: x / SG: x / pH: x  Gluc: 141 mg/dL / Ketone: x  / Bili: x / Urobili: x   Blood: x / Protein: x / Nitrite: x   Leuk Esterase: x / RBC: x / WBC x   Sq Epi: x / Non Sq Epi: x / Bacteria: x      CAPILLARY BLOOD GLUCOSE      POCT Blood Glucose.: 159 mg/dL (15 Tashi 2025 14:41)      RADIOLOGY & ADDITIONAL TESTS: Reviewed.

## 2025-01-16 NOTE — ED ADULT NURSE REASSESSMENT NOTE - NS ED NURSE REASSESS COMMENT FT1
BREAK RN: Report received from RM Wall. Pt at baseline mental status, breathing even and unlabored in bed. Pt denies chest pain, SOB, headache, dizziness, blurry vision, N/V and chills at this time. Pt endorses no complaints currently. Bed in lowest position, call bell within reach. All safety precautions in place. Pt awaiting bed placement.
Pt appears to be resting comfortably, NAD, respirations are even and unlabored. no complaints noted at this moment. VS noted. CM in progress. Pt admitted, awaiting for bed assignment. Safety precautions implemented as per protocol, awaiting further MD orders, plan of care ongoing.
Received pt in stable condition, alert and oriented x4.  Pt noted with uncontrollable loose stool in the stretcher.  Pt was cleaned, new hospital gown was placed. Pt admitted to tele.  Report was given by prior nurse. Pt was then transported to unit instable condition accompanied by family member.
Received report from Day RN: Pt appears to be resting comfortably, NAD, respirations are even and unlabored, no complaints at this moment, CM in progress. VS noted. BP noted to be soft 90/80. ACP made aware, spoke with Dr. Brown, Pt medicated as per MD orders. No other complaints noted. Safety precautions implemented as per protocol, awaiting further MD orders, plan of care ongoing.
Pt appears to be resting comfortably, NAD, respirations are even and unlabored, no complaints at this moment. VS noted. AM labs sent and pt medicated as per orders. CM in progress. Pt admitted, awaiting for bed assignment. Safety precautions implemented as per protocol, awaiting further MD orders, plan of care ongoing.
Patient resting comfortably in bed, NPO status, pending admission/bed placement. heart rate still rising up to 130s consistently but mentating well.
Patient received from gavino martins RN, medicated as per MD order. Sinus tachy on the monitor, Labwork sent and rectal 98.2. plan of care ongoing.

## 2025-01-16 NOTE — CONSULT NOTE ADULT - ASSESSMENT
89-year-old female, with past history significant for CHF, AS, Atrial fibrillation, HTN, HLD, Borderline DM, COPD, Ex-smoker, OP, PE, Radiculopathy, MDD, and Vitamin D deficiency, presented to the ED secondary to persistent shortness of breath. Found to be in afib RVR. Cardiology consulted for unclear volume status.    Patient received lasix and fluids on presentation. Received digoxin load. HR has improved from 140s to 100s, still in afib. Asymptomatic at rest. Euvolemic on exam. TTE EF 71%, mod MS and MR, IVC corroborates euvolemia. Would hold home diuretics for now in setting of possible JAY. Would treat afib RVR per EP recs who are already following.    Cardiology will sign off. Please reach out if additional questions.    Recommendations:  - hold home lasix, spironolactone, and dapagliflozin until resolution of JAY or if current Cr thought to be new baseline (Cr 1.88 on 1/6, unclear what the context of this lab draw was, prior to this Cr was 1.09 on 12/11/24)  - cw home ASA  - consider hold home amlodipine in setting of recent hypotension  - dose home metoprolol/rate control per EP

## 2025-01-17 LAB
ANION GAP SERPL CALC-SCNC: 18 MMOL/L — HIGH (ref 7–14)
BUN SERPL-MCNC: 46 MG/DL — HIGH (ref 7–23)
CALCIUM SERPL-MCNC: 8.9 MG/DL — SIGNIFICANT CHANGE UP (ref 8.4–10.5)
CHLORIDE SERPL-SCNC: 105 MMOL/L — SIGNIFICANT CHANGE UP (ref 98–107)
CO2 SERPL-SCNC: 18 MMOL/L — LOW (ref 22–31)
CREAT SERPL-MCNC: 1.33 MG/DL — HIGH (ref 0.5–1.3)
EGFR: 38 ML/MIN/1.73M2 — LOW
GLUCOSE BLDC GLUCOMTR-MCNC: 100 MG/DL — HIGH (ref 70–99)
GLUCOSE BLDC GLUCOMTR-MCNC: 132 MG/DL — HIGH (ref 70–99)
GLUCOSE BLDC GLUCOMTR-MCNC: 147 MG/DL — HIGH (ref 70–99)
GLUCOSE BLDC GLUCOMTR-MCNC: 191 MG/DL — HIGH (ref 70–99)
GLUCOSE SERPL-MCNC: 156 MG/DL — HIGH (ref 70–99)
HCT VFR BLD CALC: 50.9 % — HIGH (ref 34.5–45)
HGB BLD-MCNC: 15.5 G/DL — SIGNIFICANT CHANGE UP (ref 11.5–15.5)
MAGNESIUM SERPL-MCNC: 2.5 MG/DL — SIGNIFICANT CHANGE UP (ref 1.6–2.6)
MCHC RBC-ENTMCNC: 28.4 PG — SIGNIFICANT CHANGE UP (ref 27–34)
MCHC RBC-ENTMCNC: 30.5 G/DL — LOW (ref 32–36)
MCV RBC AUTO: 93.2 FL — SIGNIFICANT CHANGE UP (ref 80–100)
MRSA PCR RESULT.: SIGNIFICANT CHANGE UP
NRBC # BLD AUTO: 0 K/UL — SIGNIFICANT CHANGE UP (ref 0–0)
NRBC # BLD: 0 /100 WBCS — SIGNIFICANT CHANGE UP (ref 0–0)
NRBC # FLD: 0 K/UL — SIGNIFICANT CHANGE UP (ref 0–0)
NRBC BLD-RTO: 0 /100 WBCS — SIGNIFICANT CHANGE UP (ref 0–0)
PHOSPHATE SERPL-MCNC: 4.3 MG/DL — SIGNIFICANT CHANGE UP (ref 2.5–4.5)
PLATELET # BLD AUTO: 250 K/UL — SIGNIFICANT CHANGE UP (ref 150–400)
POTASSIUM SERPL-MCNC: 3.9 MMOL/L — SIGNIFICANT CHANGE UP (ref 3.5–5.3)
POTASSIUM SERPL-SCNC: 3.9 MMOL/L — SIGNIFICANT CHANGE UP (ref 3.5–5.3)
RBC # BLD: 5.46 M/UL — HIGH (ref 3.8–5.2)
RBC # FLD: 14.9 % — HIGH (ref 10.3–14.5)
S AUREUS DNA NOSE QL NAA+PROBE: SIGNIFICANT CHANGE UP
SODIUM SERPL-SCNC: 141 MMOL/L — SIGNIFICANT CHANGE UP (ref 135–145)
WBC # BLD: 13.24 K/UL — HIGH (ref 3.8–10.5)
WBC # FLD AUTO: 13.24 K/UL — HIGH (ref 3.8–10.5)

## 2025-01-17 PROCEDURE — 99232 SBSQ HOSP IP/OBS MODERATE 35: CPT

## 2025-01-17 RX ORDER — ANTISEPTIC SURGICAL SCRUB 0.04 MG/ML
1 SOLUTION TOPICAL DAILY
Refills: 0 | Status: DISCONTINUED | OUTPATIENT
Start: 2025-01-17 | End: 2025-01-22

## 2025-01-17 RX ADMIN — Medication 1000 UNIT(S): at 11:48

## 2025-01-17 RX ADMIN — PANTOPRAZOLE 40 MILLIGRAM(S): 20 TABLET, DELAYED RELEASE ORAL at 05:22

## 2025-01-17 RX ADMIN — Medication 1 APPLICATION(S): at 17:42

## 2025-01-17 RX ADMIN — GABAPENTIN 400 MILLIGRAM(S): 800 TABLET ORAL at 17:42

## 2025-01-17 RX ADMIN — APIXABAN 2.5 MILLIGRAM(S): 5 TABLET, FILM COATED ORAL at 17:42

## 2025-01-17 RX ADMIN — Medication 25 MILLIGRAM(S): at 05:22

## 2025-01-17 RX ADMIN — Medication 1: at 17:51

## 2025-01-17 RX ADMIN — Medication 1 APPLICATION(S): at 05:22

## 2025-01-17 RX ADMIN — GABAPENTIN 400 MILLIGRAM(S): 800 TABLET ORAL at 05:22

## 2025-01-17 RX ADMIN — Medication 2 TABLET(S): at 22:34

## 2025-01-17 RX ADMIN — BUPROPION HYDROCHLORIDE 150 MILLIGRAM(S): 150 TABLET, EXTENDED RELEASE ORAL at 11:40

## 2025-01-17 RX ADMIN — ANTISEPTIC SURGICAL SCRUB 1 APPLICATION(S): 0.04 SOLUTION TOPICAL at 11:41

## 2025-01-17 RX ADMIN — APIXABAN 2.5 MILLIGRAM(S): 5 TABLET, FILM COATED ORAL at 05:22

## 2025-01-17 RX ADMIN — ASPIRIN 81 MILLIGRAM(S): 81 TABLET, COATED ORAL at 11:39

## 2025-01-17 RX ADMIN — PAROXETINE HYDROCHLORIDE 10 MILLIGRAM(S): 10 TABLET, FILM COATED ORAL at 11:40

## 2025-01-17 RX ADMIN — POLYETHYLENE GLYCOL 3350 17 GRAM(S): 17 POWDER, FOR SOLUTION ORAL at 11:39

## 2025-01-17 NOTE — PROGRESS NOTE ADULT - PROBLEM SELECTOR PLAN 1
- pt p/w SOB, difficulty ambulating, and hypoxia, referred from PCP to ER for evaluation  - chart review shows pt f/w cards Dr. Perdue and recently changed entresto to spironolactone d/t cost issues  - home meds: farxiga 10mg daily, lasix 20mg T/R/Sat and 40mg MWF/Sun, toprol 25mg daily, spironolactone 25mg daily  - compliant with meds  - on arrival, a-fib RVR and requiring 3L NC -- now rate controlled and on RA  - Cr elevated as below  - TSH WNL  - BNP 5092  - UA neg, COV/flu/RSV neg  - CXR c/w CHF  - TTE with EF 71%, LA sev dil, and mod MV stenosis  - cards consulted, recommending holding diuresis and GDMT until Cr resolves; s/o   - s/p lasix 40mg IV --> euvolemic; hold further diuresis   - strict I&O, daily weights, and monitor on tele

## 2025-01-17 NOTE — PROGRESS NOTE ADULT - SUBJECTIVE AND OBJECTIVE BOX
[facility] of [facility] Medicine  Mecca Montoya DO  Available on MS Teams  Pager: [phone]    Patient is a 89y old  Female who presents with a chief complaint of Increasing shortness of breath (15 Tashi 2025 21:41)    Subjective:  Pt seen and examined at bedside sleeping, easily arousable but says she wants to keep sleeping.   Spoke with  Reinaldo on the phone, says he will visit with his son later today. Says she did not sleep much yesterday in the ER, so believes that she is quite tired. Appreciative of updates. Discussed improved HR and medication plan. No questions or concerns.     Vital Signs Last 24 Hrs  T(C): 36.6 (17 Jan 2025 11:35), Max: 36.8 (17 Jan 2025 02:00)  T(F): 97.9 (17 Jan 2025 11:35), Max: 98.2 (17 Jan 2025 02:00)  HR: 45 (17 Jan 2025 11:35) (45 - 100)  BP: 128/52 (17 Jan 2025 11:35) (103/68 - 128/52)  BP(mean): --  RR: 18 (17 Jan 2025 11:35) (17 - 18)  SpO2: 99% (17 Jan 2025 11:35) (93% - 99%)    Parameters below as of 17 Jan 2025 11:35  Patient On (Oxygen Delivery Method): room air    PHYSICAL EXAM:  Constitutional: resting comfortably in bed; NAD; elderly frail F; appears tired; sleeping but arousable  Head: NC/AT  Eyes: PERRL, EOMI, anicteric sclera  ENT: no nasal discharge; MMM  Neck: supple; no JVD  Respiratory: bibasilar crackles BL; comfortable on RA without respiratory distress  Cardiac: +S1/S2; irregularly irregular in 90s; no M/R/G  Gastrointestinal: soft, NT/ND; no rebound or guarding; +BSx4  Extremities: WWP, no clubbing or cyanosis; no peripheral edema  Musculoskeletal: NROM x4; no joint swelling, tenderness or erythema  Neurologic: AAOx3; CNII-XII grossly intact; no focal deficits  Psychiatric: affect and characteristics of appearance, verbalizations, behaviors are appropriate    MEDICATIONS  (STANDING):  apixaban 2.5 milliGRAM(s) Oral every 12 hours  aspirin enteric coated 81 milliGRAM(s) Oral daily  bacitracin   Ointment 1 Application(s) Topical two times a day  bisacodyl Suppository 10 milliGRAM(s) Rectal at bedtime  buPROPion XL (24-Hour) . 150 milliGRAM(s) Oral daily  chlorhexidine 2% Cloths 1 Application(s) Topical daily  cholecalciferol 1000 Unit(s) Oral daily  dextrose 5%. 1000 milliLiter(s) (50 mL/Hr) IV Continuous <Continuous>  dextrose 5%. 1000 milliLiter(s) (100 mL/Hr) IV Continuous <Continuous>  dextrose 50% Injectable 25 Gram(s) IV Push once  dextrose 50% Injectable 12.5 Gram(s) IV Push once  dextrose 50% Injectable 25 Gram(s) IV Push once  gabapentin 400 milliGRAM(s) Oral two times a day  glucagon  Injectable 1 milliGRAM(s) IntraMuscular once  insulin lispro (ADMELOG) corrective regimen sliding scale   SubCutaneous three times a day before meals  insulin lispro (ADMELOG) corrective regimen sliding scale   SubCutaneous at bedtime  metoprolol tartrate 25 milliGRAM(s) Oral two times a day  pantoprazole    Tablet 40 milliGRAM(s) Oral before breakfast  PARoxetine 10 milliGRAM(s) Oral daily  polyethylene glycol 3350 17 Gram(s) Oral daily  senna 2 Tablet(s) Oral at bedtime    MEDICATIONS  (PRN):  acetaminophen     Tablet .. 650 milliGRAM(s) Oral every 6 hours PRN Mild Pain (1 - 3)  bisacodyl 5 milliGRAM(s) Oral daily PRN Constipation  dextrose Oral Gel 15 Gram(s) Oral once PRN Blood Glucose LESS THAN 70 milliGRAM(s)/deciliter    LABS:                        15.5   13.24 )-----------( 250      ( 17 Jan 2025 07:10 )             50.9     01-17    141  |  105  |  46[H]  ----------------------------<  156[H]  3.9   |  18[L]  |  1.33[H]    Ca    8.9      17 Jan 2025 07:10  Phos  4.3     01-17  Mg     2.50     01-17    TPro  6.7  /  Alb  3.7  /  TBili  0.5  /  DBili  x   /  AST  19  /  ALT  21  /  AlkPhos  91  01-15    PT/INR - ( 15 Tashi 2025 17:00 )   PT: 17.7 sec;   INR: 1.53 ratio         PTT - ( 15 Tashi 2025 17:00 )  PTT:43.5 sec  Urinalysis Basic - ( 17 Jan 2025 07:10 )    Color: x / Appearance: x / SG: x / pH: x  Gluc: 156 mg/dL / Ketone: x  / Bili: x / Urobili: x   Blood: x / Protein: x / Nitrite: x   Leuk Esterase: x / RBC: x / WBC x   Sq Epi: x / Non Sq Epi: x / Bacteria: x      CAPILLARY BLOOD GLUCOSE      POCT Blood Glucose.: 147 mg/dL (17 Jan 2025 11:32)      RADIOLOGY & ADDITIONAL TESTS: Reviewed.

## 2025-01-17 NOTE — PROGRESS NOTE ADULT - PROBLEM SELECTOR PLAN 2
- pt p/w a-fib -140s  - known hx a-fib on eliquis 2.5mg BID and toprol 25mg daily  - s/p digoxin and lopressor in ER --> now rate controlled 80-90s  - EP following, appreciate recs  - c/w lopressor 50mg BID  - c/w home eliquis  - monitor on tele

## 2025-01-17 NOTE — PROGRESS NOTE ADULT - SUBJECTIVE AND OBJECTIVE BOX
Patient seen and examined at bedside.    Overnight Events:   NAEON  Tele - Sinus konstantin with PACs    REVIEW OF SYSTEMS:  All other review of systems is negative unless indicated above.            Current Meds:  acetaminophen     Tablet .. 650 milliGRAM(s) Oral every 6 hours PRN  apixaban 2.5 milliGRAM(s) Oral every 12 hours  aspirin enteric coated 81 milliGRAM(s) Oral daily  bacitracin   Ointment 1 Application(s) Topical two times a day  bisacodyl 5 milliGRAM(s) Oral daily PRN  bisacodyl Suppository 10 milliGRAM(s) Rectal at bedtime  buPROPion XL (24-Hour) . 150 milliGRAM(s) Oral daily  chlorhexidine 2% Cloths 1 Application(s) Topical daily  cholecalciferol 1000 Unit(s) Oral daily  dextrose 5%. 1000 milliLiter(s) IV Continuous <Continuous>  dextrose 5%. 1000 milliLiter(s) IV Continuous <Continuous>  dextrose 50% Injectable 25 Gram(s) IV Push once  dextrose 50% Injectable 12.5 Gram(s) IV Push once  dextrose 50% Injectable 25 Gram(s) IV Push once  dextrose Oral Gel 15 Gram(s) Oral once PRN  gabapentin 400 milliGRAM(s) Oral two times a day  glucagon  Injectable 1 milliGRAM(s) IntraMuscular once  insulin lispro (ADMELOG) corrective regimen sliding scale   SubCutaneous three times a day before meals  insulin lispro (ADMELOG) corrective regimen sliding scale   SubCutaneous at bedtime  metoprolol tartrate 25 milliGRAM(s) Oral two times a day  pantoprazole    Tablet 40 milliGRAM(s) Oral before breakfast  PARoxetine 10 milliGRAM(s) Oral daily  polyethylene glycol 3350 17 Gram(s) Oral daily  senna 2 Tablet(s) Oral at bedtime      Vitals:  T(F): 97.9 (01-17), Max: 98.2 (01-17)  HR: 45 (01-17) (45 - 100)  BP: 128/52 (01-17) (103/68 - 128/52)  RR: 18 (01-17)  SpO2: 99% (01-17)  I&O's Summary      Physical Exam:  Appearance: No acute distress; well appearing  Eyes:  EOMI  HEENT: Normal oral mucosa  Cardiovascular: RRR, S1, S2, no murmurs, rubs, or gallops; no edema; no JVD  Respiratory: Clear to auscultation bilaterally  Gastrointestinal: soft, non-tender, non-distended  Musculoskeletal: No clubbing;  Neurologic: Non-focal  Psychiatry: AAOx3, mood & affect appropriate                          15.5   13.24 )-----------( 250      ( 17 Jan 2025 07:10 )             50.9     01-17    141  |  105  |  46[H]  ----------------------------<  156[H]  3.9   |  18[L]  |  1.33[H]    Ca    8.9      17 Jan 2025 07:10  Phos  4.3     01-17  Mg     2.50     01-17    TPro  6.7  /  Alb  3.7  /  TBili  0.5  /  DBili  x   /  AST  19  /  ALT  21  /  AlkPhos  91  01-15    PT/INR - ( 15 Tashi 2025 17:00 )   PT: 17.7 sec;   INR: 1.53 ratio         PTT - ( 15 Tashi 2025 17:00 )  PTT:43.5 sec      Interpretation of Telemetry:

## 2025-01-17 NOTE — PROGRESS NOTE ADULT - NS ATTEND AMEND GEN_ALL_CORE FT
89 y/o woman with chronic AF (Eliquis) and HFpEF p/w AF w/RVR. Rate control with metoprolol. Would avoid chronic digoxin. Continue Eliquis for anticoagulation.

## 2025-01-18 LAB
ANION GAP SERPL CALC-SCNC: 16 MMOL/L — HIGH (ref 7–14)
BUN SERPL-MCNC: 46 MG/DL — HIGH (ref 7–23)
CALCIUM SERPL-MCNC: 8.7 MG/DL — SIGNIFICANT CHANGE UP (ref 8.4–10.5)
CHLORIDE SERPL-SCNC: 105 MMOL/L — SIGNIFICANT CHANGE UP (ref 98–107)
CO2 SERPL-SCNC: 21 MMOL/L — LOW (ref 22–31)
CREAT SERPL-MCNC: 1.22 MG/DL — SIGNIFICANT CHANGE UP (ref 0.5–1.3)
EGFR: 42 ML/MIN/1.73M2 — LOW
GLUCOSE BLDC GLUCOMTR-MCNC: 121 MG/DL — HIGH (ref 70–99)
GLUCOSE BLDC GLUCOMTR-MCNC: 126 MG/DL — HIGH (ref 70–99)
GLUCOSE BLDC GLUCOMTR-MCNC: 234 MG/DL — HIGH (ref 70–99)
GLUCOSE BLDC GLUCOMTR-MCNC: 95 MG/DL — SIGNIFICANT CHANGE UP (ref 70–99)
GLUCOSE SERPL-MCNC: 119 MG/DL — HIGH (ref 70–99)
HCT VFR BLD CALC: 47.2 % — HIGH (ref 34.5–45)
HGB BLD-MCNC: 14.9 G/DL — SIGNIFICANT CHANGE UP (ref 11.5–15.5)
MAGNESIUM SERPL-MCNC: 2.5 MG/DL — SIGNIFICANT CHANGE UP (ref 1.6–2.6)
MCHC RBC-ENTMCNC: 29.3 PG — SIGNIFICANT CHANGE UP (ref 27–34)
MCHC RBC-ENTMCNC: 31.6 G/DL — LOW (ref 32–36)
MCV RBC AUTO: 92.7 FL — SIGNIFICANT CHANGE UP (ref 80–100)
NRBC # BLD AUTO: 0 K/UL — SIGNIFICANT CHANGE UP (ref 0–0)
NRBC # BLD: 0 /100 WBCS — SIGNIFICANT CHANGE UP (ref 0–0)
NRBC # FLD: 0 K/UL — SIGNIFICANT CHANGE UP (ref 0–0)
NRBC BLD-RTO: 0 /100 WBCS — SIGNIFICANT CHANGE UP (ref 0–0)
PHOSPHATE SERPL-MCNC: 3.3 MG/DL — SIGNIFICANT CHANGE UP (ref 2.5–4.5)
PLATELET # BLD AUTO: 192 K/UL — SIGNIFICANT CHANGE UP (ref 150–400)
POTASSIUM SERPL-MCNC: 4.2 MMOL/L — SIGNIFICANT CHANGE UP (ref 3.5–5.3)
POTASSIUM SERPL-SCNC: 4.2 MMOL/L — SIGNIFICANT CHANGE UP (ref 3.5–5.3)
RBC # BLD: 5.09 M/UL — SIGNIFICANT CHANGE UP (ref 3.8–5.2)
RBC # FLD: 14.5 % — SIGNIFICANT CHANGE UP (ref 10.3–14.5)
SODIUM SERPL-SCNC: 142 MMOL/L — SIGNIFICANT CHANGE UP (ref 135–145)
WBC # BLD: 9.87 K/UL — SIGNIFICANT CHANGE UP (ref 3.8–10.5)
WBC # FLD AUTO: 9.87 K/UL — SIGNIFICANT CHANGE UP (ref 3.8–10.5)

## 2025-01-18 PROCEDURE — 99232 SBSQ HOSP IP/OBS MODERATE 35: CPT

## 2025-01-18 RX ORDER — METOPROLOL SUCCINATE 25 MG
12.5 TABLET, EXTENDED RELEASE 24 HR ORAL
Refills: 0 | Status: DISCONTINUED | OUTPATIENT
Start: 2025-01-18 | End: 2025-01-22

## 2025-01-18 RX ADMIN — ACETAMINOPHEN 650 MILLIGRAM(S): 160 SUSPENSION ORAL at 10:26

## 2025-01-18 RX ADMIN — ASPIRIN 81 MILLIGRAM(S): 81 TABLET, COATED ORAL at 11:25

## 2025-01-18 RX ADMIN — Medication 1000 UNIT(S): at 11:24

## 2025-01-18 RX ADMIN — Medication 1 APPLICATION(S): at 05:14

## 2025-01-18 RX ADMIN — PANTOPRAZOLE 40 MILLIGRAM(S): 20 TABLET, DELAYED RELEASE ORAL at 05:16

## 2025-01-18 RX ADMIN — ANTISEPTIC SURGICAL SCRUB 1 APPLICATION(S): 0.04 SOLUTION TOPICAL at 11:25

## 2025-01-18 RX ADMIN — POLYETHYLENE GLYCOL 3350 17 GRAM(S): 17 POWDER, FOR SOLUTION ORAL at 11:25

## 2025-01-18 RX ADMIN — GABAPENTIN 400 MILLIGRAM(S): 800 TABLET ORAL at 05:13

## 2025-01-18 RX ADMIN — Medication 2 TABLET(S): at 21:04

## 2025-01-18 RX ADMIN — Medication 5 MILLIGRAM(S): at 16:25

## 2025-01-18 RX ADMIN — Medication 1 APPLICATION(S): at 17:23

## 2025-01-18 RX ADMIN — PAROXETINE HYDROCHLORIDE 10 MILLIGRAM(S): 10 TABLET, FILM COATED ORAL at 11:24

## 2025-01-18 RX ADMIN — BUPROPION HYDROCHLORIDE 150 MILLIGRAM(S): 150 TABLET, EXTENDED RELEASE ORAL at 11:24

## 2025-01-18 RX ADMIN — Medication 25 MILLIGRAM(S): at 05:13

## 2025-01-18 RX ADMIN — GABAPENTIN 400 MILLIGRAM(S): 800 TABLET ORAL at 17:24

## 2025-01-18 RX ADMIN — APIXABAN 2.5 MILLIGRAM(S): 5 TABLET, FILM COATED ORAL at 05:13

## 2025-01-18 RX ADMIN — APIXABAN 2.5 MILLIGRAM(S): 5 TABLET, FILM COATED ORAL at 17:24

## 2025-01-18 NOTE — PROGRESS NOTE ADULT - SUBJECTIVE AND OBJECTIVE BOX
Patient is a 89y old  Female who presents with a chief complaint of Increasing shortness of breath (15 Tashi 2025 21:41)    Subjective:  Bradycardic w HR in 50's. BP stable. sCR Improving.   On Metoprolol 25 mg BID.     Vital Signs Last 24 Hrs  ICU Vital Signs Last 24 Hrs  T(C): 36.6 (18 Jan 2025 05:09), Max: 36.8 (17 Jan 2025 09:20)  T(F): 97.8 (18 Jan 2025 05:09), Max: 98.2 (17 Jan 2025 09:20)  HR: 54 (18 Jan 2025 05:09) (45 - 54)  BP: 126/65 (18 Jan 2025 05:09) (110/72 - 139/66)  BP(mean): --  ABP: --  ABP(mean): --  RR: 18 (18 Jan 2025 05:09) (17 - 18)  SpO2: 96% (18 Jan 2025 05:09) (96% - 100%)    O2 Parameters below as of 18 Jan 2025 05:09  Patient On (Oxygen Delivery Method): room air            PHYSICAL EXAM:  Constitutional: resting comfortably in bed; NAD.  Head: NC/AT  Eyes: PERRL, EOMI, anicteric sclera  ENT: no nasal discharge; MMM  Neck: supple; no JVD  Respiratory: bibasilar crackles BL; comfortable on RA without respiratory distress  Cardiac: +S1/S2; irregularly irregular in 90s; no M/R/G  Gastrointestinal: soft, NT/ND; no rebound or guarding; +BSx4  Extremities: WWP, no clubbing or cyanosis; no peripheral edema  Musculoskeletal: NROM x4; no joint swelling, tenderness or erythema  Neurologic: AAOx3; CNII-XII grossly intact; no focal deficits  Psychiatric: affect and characteristics of appearance, verbalizations, behaviors are appropriate    MEDICATIONS  (STANDING):  apixaban 2.5 milliGRAM(s) Oral every 12 hours  aspirin enteric coated 81 milliGRAM(s) Oral daily  bacitracin   Ointment 1 Application(s) Topical two times a day  bisacodyl Suppository 10 milliGRAM(s) Rectal at bedtime  buPROPion XL (24-Hour) . 150 milliGRAM(s) Oral daily  chlorhexidine 2% Cloths 1 Application(s) Topical daily  cholecalciferol 1000 Unit(s) Oral daily  dextrose 5%. 1000 milliLiter(s) (50 mL/Hr) IV Continuous <Continuous>  dextrose 5%. 1000 milliLiter(s) (100 mL/Hr) IV Continuous <Continuous>  dextrose 50% Injectable 25 Gram(s) IV Push once  dextrose 50% Injectable 12.5 Gram(s) IV Push once  dextrose 50% Injectable 25 Gram(s) IV Push once  gabapentin 400 milliGRAM(s) Oral two times a day  glucagon  Injectable 1 milliGRAM(s) IntraMuscular once  insulin lispro (ADMELOG) corrective regimen sliding scale   SubCutaneous three times a day before meals  insulin lispro (ADMELOG) corrective regimen sliding scale   SubCutaneous at bedtime  metoprolol tartrate 25 milliGRAM(s) Oral two times a day  pantoprazole    Tablet 40 milliGRAM(s) Oral before breakfast  PARoxetine 10 milliGRAM(s) Oral daily  polyethylene glycol 3350 17 Gram(s) Oral daily  senna 2 Tablet(s) Oral at bedtime    MEDICATIONS  (PRN):  acetaminophen     Tablet .. 650 milliGRAM(s) Oral every 6 hours PRN Mild Pain (1 - 3)  bisacodyl 5 milliGRAM(s) Oral daily PRN Constipation  dextrose Oral Gel 15 Gram(s) Oral once PRN Blood Glucose LESS THAN 70 milliGRAM(s)/deciliter    LABS:                                   14.9   9.87  )-----------( 192      ( 18 Jan 2025 06:25 )             47.2       01-18    142  |  105  |  46[H]  ----------------------------<  119[H]  4.2   |  21[L]  |  1.22    Ca    8.7      18 Jan 2025 06:25  Phos  4.3     01-17  Mg     2.50     01-18        Color: x / Appearance: x / SG: x / pH: x  Gluc: 156 mg/dL / Ketone: x  / Bili: x / Urobili: x   Blood: x / Protein: x / Nitrite: x   Leuk Esterase: x / RBC: x / WBC x   Sq Epi: x / Non Sq Epi: x / Bacteria: x      CAPILLARY BLOOD GLUCOSE      POCT Blood Glucose.: 147 mg/dL (17 Jan 2025 11:32)      RADIOLOGY & ADDITIONAL TESTS: Reviewed.

## 2025-01-18 NOTE — PROGRESS NOTE ADULT - PROBLEM SELECTOR PLAN 2
- pt p/w a-fib -140s, Now Bradycardic.   - known hx a-fib on eliquis 2.5mg BID and toprol 25mg daily  - s/p digoxin and lopressor in ER --> now rate controlled 80-90s  - EP following, appreciate recs  - On Lopressor 25 mg BID, Low dose Eliquis  - monitor on tele - pt p/w a-fib -140s, Now Bradycardic.   - known hx a-fib on eliquis 2.5mg BID and toprol 25mg daily  - s/p digoxin and lopressor in ER --> now rate controlled 80-90s  - EP following, appreciate recs  - On Lopressor 25 mg BID- decreased to 12.5 mg BID today d/t konstantin, Low dose Eliquis  - monitor on tele

## 2025-01-18 NOTE — PROGRESS NOTE ADULT - PROBLEM SELECTOR PLAN 1
- AHRF on arrival has now resolved.   - home meds: farxiga 10mg daily, lasix 20mg T/R/Sat and 40mg MWF/Sun, toprol 25mg daily, spironolactone 25mg daily  - TTE with EF 71%, LA severe dil, and mod MV stenosis.  - Initially diuresed w IV Lasix- Now held d/t JAY.   - cards consulted, recommending holding diuresis and GDMT until Cr resolves; s/o.

## 2025-01-18 NOTE — PHYSICAL THERAPY INITIAL EVALUATION ADULT - ADDITIONAL COMMENTS
Pt lives in a garden apartment with her , 14 steps with unilateral railing to bedroom. Son lives 4 blocks away and is able to assist as needed.

## 2025-01-18 NOTE — PHYSICAL THERAPY INITIAL EVALUATION ADULT - GENERAL OBSERVATIONS, REHAB EVAL
Pt found semi reclined in bed; +telemetry monitor; spoke with RM Mariee, who advised patient may participate. Pt agreeable to PT.

## 2025-01-18 NOTE — PHYSICAL THERAPY INITIAL EVALUATION ADULT - PERTINENT HX OF CURRENT PROBLEM, REHAB EVAL
As per chart, patient is an 89 year old female, with past history significant for CHF, AS, Atrial fibrillation, HTN, HLD, Borderline DM, COPD, Ex-smoker, OP, PE, Radiculopathy, MDD, and Vitamin D deficiency, presented to the ED secondary to persistent shortness of breath.

## 2025-01-18 NOTE — PHYSICAL THERAPY INITIAL EVALUATION ADULT - PLANNED THERAPY INTERVENTIONS, PT EVAL
Patient left positioned for safety in bed in NAD, call bell in reach, all lines intact. +bed alarm. Spoke with BRAD Machado made aware of discharge recommendation./balance training/bed mobility training/gait training/strengthening/transfer training

## 2025-01-19 LAB
ANION GAP SERPL CALC-SCNC: 15 MMOL/L — HIGH (ref 7–14)
BUN SERPL-MCNC: 45 MG/DL — HIGH (ref 7–23)
CALCIUM SERPL-MCNC: 8.6 MG/DL — SIGNIFICANT CHANGE UP (ref 8.4–10.5)
CHLORIDE SERPL-SCNC: 105 MMOL/L — SIGNIFICANT CHANGE UP (ref 98–107)
CO2 SERPL-SCNC: 20 MMOL/L — LOW (ref 22–31)
CREAT SERPL-MCNC: 0.97 MG/DL — SIGNIFICANT CHANGE UP (ref 0.5–1.3)
EGFR: 56 ML/MIN/1.73M2 — LOW
GLUCOSE BLDC GLUCOMTR-MCNC: 126 MG/DL — HIGH (ref 70–99)
GLUCOSE BLDC GLUCOMTR-MCNC: 139 MG/DL — HIGH (ref 70–99)
GLUCOSE BLDC GLUCOMTR-MCNC: 147 MG/DL — HIGH (ref 70–99)
GLUCOSE BLDC GLUCOMTR-MCNC: 192 MG/DL — HIGH (ref 70–99)
GLUCOSE SERPL-MCNC: 165 MG/DL — HIGH (ref 70–99)
HCT VFR BLD CALC: 47.3 % — HIGH (ref 34.5–45)
HGB BLD-MCNC: 14.8 G/DL — SIGNIFICANT CHANGE UP (ref 11.5–15.5)
MAGNESIUM SERPL-MCNC: 2.6 MG/DL — SIGNIFICANT CHANGE UP (ref 1.6–2.6)
MCHC RBC-ENTMCNC: 28.8 PG — SIGNIFICANT CHANGE UP (ref 27–34)
MCHC RBC-ENTMCNC: 31.3 G/DL — LOW (ref 32–36)
MCV RBC AUTO: 92 FL — SIGNIFICANT CHANGE UP (ref 80–100)
NRBC # BLD AUTO: 0 K/UL — SIGNIFICANT CHANGE UP (ref 0–0)
NRBC # BLD: 0 /100 WBCS — SIGNIFICANT CHANGE UP (ref 0–0)
NRBC # FLD: 0 K/UL — SIGNIFICANT CHANGE UP (ref 0–0)
NRBC BLD-RTO: 0 /100 WBCS — SIGNIFICANT CHANGE UP (ref 0–0)
PHOSPHATE SERPL-MCNC: 3.2 MG/DL — SIGNIFICANT CHANGE UP (ref 2.5–4.5)
PLATELET # BLD AUTO: 179 K/UL — SIGNIFICANT CHANGE UP (ref 150–400)
POTASSIUM SERPL-MCNC: 4.6 MMOL/L — SIGNIFICANT CHANGE UP (ref 3.5–5.3)
POTASSIUM SERPL-SCNC: 4.6 MMOL/L — SIGNIFICANT CHANGE UP (ref 3.5–5.3)
RBC # BLD: 5.14 M/UL — SIGNIFICANT CHANGE UP (ref 3.8–5.2)
RBC # FLD: 14.5 % — SIGNIFICANT CHANGE UP (ref 10.3–14.5)
SODIUM SERPL-SCNC: 140 MMOL/L — SIGNIFICANT CHANGE UP (ref 135–145)
WBC # BLD: 10.19 K/UL — SIGNIFICANT CHANGE UP (ref 3.8–10.5)
WBC # FLD AUTO: 10.19 K/UL — SIGNIFICANT CHANGE UP (ref 3.8–10.5)

## 2025-01-19 PROCEDURE — 99232 SBSQ HOSP IP/OBS MODERATE 35: CPT

## 2025-01-19 RX ORDER — POLYETHYLENE GLYCOL 3350 17 G/17G
17 POWDER, FOR SOLUTION ORAL DAILY
Refills: 0 | Status: DISCONTINUED | OUTPATIENT
Start: 2025-01-19 | End: 2025-01-19

## 2025-01-19 RX ADMIN — PANTOPRAZOLE 40 MILLIGRAM(S): 20 TABLET, DELAYED RELEASE ORAL at 05:24

## 2025-01-19 RX ADMIN — Medication 1000 UNIT(S): at 11:16

## 2025-01-19 RX ADMIN — GABAPENTIN 400 MILLIGRAM(S): 800 TABLET ORAL at 18:01

## 2025-01-19 RX ADMIN — Medication 1 APPLICATION(S): at 18:47

## 2025-01-19 RX ADMIN — APIXABAN 2.5 MILLIGRAM(S): 5 TABLET, FILM COATED ORAL at 05:24

## 2025-01-19 RX ADMIN — APIXABAN 2.5 MILLIGRAM(S): 5 TABLET, FILM COATED ORAL at 18:03

## 2025-01-19 RX ADMIN — ASPIRIN 81 MILLIGRAM(S): 81 TABLET, COATED ORAL at 11:17

## 2025-01-19 RX ADMIN — Medication 2 TABLET(S): at 21:53

## 2025-01-19 RX ADMIN — Medication 1 APPLICATION(S): at 05:24

## 2025-01-19 RX ADMIN — ANTISEPTIC SURGICAL SCRUB 1 APPLICATION(S): 0.04 SOLUTION TOPICAL at 11:21

## 2025-01-19 RX ADMIN — GABAPENTIN 400 MILLIGRAM(S): 800 TABLET ORAL at 05:27

## 2025-01-19 RX ADMIN — Medication 12.5 MILLIGRAM(S): at 18:03

## 2025-01-19 RX ADMIN — PAROXETINE HYDROCHLORIDE 10 MILLIGRAM(S): 10 TABLET, FILM COATED ORAL at 11:16

## 2025-01-19 RX ADMIN — BUPROPION HYDROCHLORIDE 150 MILLIGRAM(S): 150 TABLET, EXTENDED RELEASE ORAL at 11:17

## 2025-01-19 RX ADMIN — POLYETHYLENE GLYCOL 3350 17 GRAM(S): 17 POWDER, FOR SOLUTION ORAL at 11:15

## 2025-01-19 RX ADMIN — Medication 5 MILLIGRAM(S): at 13:27

## 2025-01-19 NOTE — PROGRESS NOTE ADULT - PROBLEM SELECTOR PLAN 2
- pt p/w a-fib -140s, Now Bradycardic.   - known hx a-fib on eliquis 2.5mg BID and toprol 25mg daily  - s/p digoxin and lopressor in ER --> now rate controlled 80-90s  - EP following, appreciate recs  - On Lopressor 25 mg BID- decreased to 12.5 mg BID today d/t konstantin, Low dose Eliquis  - monitor on tele

## 2025-01-19 NOTE — PROGRESS NOTE ADULT - SUBJECTIVE AND OBJECTIVE BOX
Patient is a 89y old  Female who presents with a chief complaint of Increasing shortness of breath (15 Tashi 2025 21:41)    Subjective:  Bradycardic w HR in 50's. BP stable. sCR Improving.   On Metoprolol 25 mg BID.     Vital Signs Last 24 Hrs  ICU Vital Signs Last 24 Hrs  T(C): 36.6 (18 Jan 2025 05:09), Max: 36.8 (17 Jan 2025 09:20)  T(F): 97.8 (18 Jan 2025 05:09), Max: 98.2 (17 Jan 2025 09:20)  HR: 54 (18 Jan 2025 05:09) (45 - 54)  BP: 126/65 (18 Jan 2025 05:09) (110/72 - 139/66)  BP(mean): --  ABP: --  ABP(mean): --  RR: 18 (18 Jan 2025 05:09) (17 - 18)  SpO2: 96% (18 Jan 2025 05:09) (96% - 100%)    O2 Parameters below as of 18 Jan 2025 05:09  Patient On (Oxygen Delivery Method): room air            PHYSICAL EXAM:  Constitutional: resting comfortably in bed; NAD.  Head: NC/AT  Eyes: PERRL, EOMI, anicteric sclera  ENT: no nasal discharge; MMM  Neck: supple; no JVD  Respiratory: bibasilar crackles BL; comfortable on RA without respiratory distress  Cardiac: +S1/S2; irregularly irregular in 90s; no M/R/G  Gastrointestinal: soft, NT/ND; no rebound or guarding; +BSx4  Extremities: WWP, no clubbing or cyanosis; no peripheral edema  Musculoskeletal: NROM x4; no joint swelling, tenderness or erythema  Neurologic: AAOx3; CNII-XII grossly intact; no focal deficits  Psychiatric: affect and characteristics of appearance, verbalizations, behaviors are appropriate    MEDICATIONS  (STANDING):  apixaban 2.5 milliGRAM(s) Oral every 12 hours  aspirin enteric coated 81 milliGRAM(s) Oral daily  bacitracin   Ointment 1 Application(s) Topical two times a day  bisacodyl Suppository 10 milliGRAM(s) Rectal at bedtime  buPROPion XL (24-Hour) . 150 milliGRAM(s) Oral daily  chlorhexidine 2% Cloths 1 Application(s) Topical daily  cholecalciferol 1000 Unit(s) Oral daily  dextrose 5%. 1000 milliLiter(s) (50 mL/Hr) IV Continuous <Continuous>  dextrose 5%. 1000 milliLiter(s) (100 mL/Hr) IV Continuous <Continuous>  dextrose 50% Injectable 25 Gram(s) IV Push once  dextrose 50% Injectable 12.5 Gram(s) IV Push once  dextrose 50% Injectable 25 Gram(s) IV Push once  gabapentin 400 milliGRAM(s) Oral two times a day  glucagon  Injectable 1 milliGRAM(s) IntraMuscular once  insulin lispro (ADMELOG) corrective regimen sliding scale   SubCutaneous three times a day before meals  insulin lispro (ADMELOG) corrective regimen sliding scale   SubCutaneous at bedtime  metoprolol tartrate 25 milliGRAM(s) Oral two times a day  pantoprazole    Tablet 40 milliGRAM(s) Oral before breakfast  PARoxetine 10 milliGRAM(s) Oral daily  polyethylene glycol 3350 17 Gram(s) Oral daily  senna 2 Tablet(s) Oral at bedtime    MEDICATIONS  (PRN):  acetaminophen     Tablet .. 650 milliGRAM(s) Oral every 6 hours PRN Mild Pain (1 - 3)  bisacodyl 5 milliGRAM(s) Oral daily PRN Constipation  dextrose Oral Gel 15 Gram(s) Oral once PRN Blood Glucose LESS THAN 70 milliGRAM(s)/deciliter    LABS:                                   14.9   9.87  )-----------( 192      ( 18 Jan 2025 06:25 )             47.2       01-18    142  |  105  |  46[H]  ----------------------------<  119[H]  4.2   |  21[L]  |  1.22    Ca    8.7      18 Jan 2025 06:25  Phos  4.3     01-17  Mg     2.50     01-18        Color: x / Appearance: x / SG: x / pH: x  Gluc: 156 mg/dL / Ketone: x  / Bili: x / Urobili: x   Blood: x / Protein: x / Nitrite: x   Leuk Esterase: x / RBC: x / WBC x   Sq Epi: x / Non Sq Epi: x / Bacteria: x      CAPILLARY BLOOD GLUCOSE      POCT Blood Glucose.: 147 mg/dL (17 Jan 2025 11:32)      RADIOLOGY & ADDITIONAL TESTS: Reviewed.           Patient is a 89y old  Female who presents with a chief complaint of Increasing shortness of breath (15 Tashi 2025 21:41)    Subjective:  HR better today.  PT recs AMOL.       Vital Signs Last 24 Hrs  ICU Vital Signs Last 24 Hrs  T(C): 36.9 (19 Jan 2025 09:25), Max: 36.9 (19 Jan 2025 09:25)  T(F): 98.4 (19 Jan 2025 09:25), Max: 98.4 (19 Jan 2025 09:25)  HR: 61 (19 Jan 2025 09:25) (48 - 61)  BP: 125/55 (19 Jan 2025 09:25) (117/50 - 136/63)  BP(mean): 81 (18 Jan 2025 17:28) (81 - 81)  ABP: --  ABP(mean): --  RR: 17 (19 Jan 2025 09:25) (17 - 18)  SpO2: 97% (19 Jan 2025 09:25) (96% - 100%)    O2 Parameters below as of 19 Jan 2025 09:25  Patient On (Oxygen Delivery Method): room air            O2 Parameters below as of 18 Jan 2025 05:09  Patient On (Oxygen Delivery Method): room air            PHYSICAL EXAM:  Constitutional: resting comfortably in bed; NAD.  Head: NC/AT  Eyes: PERRL, EOMI, anicteric sclera  ENT: no nasal discharge; MMM  Neck: supple; no JVD  Respiratory: bibasilar crackles BL; comfortable on RA without respiratory distress  Cardiac: +S1/S2; irregularly irregular in 90s; no M/R/G  Gastrointestinal: soft, NT/ND; no rebound or guarding; +BSx4  Extremities: WWP, no clubbing or cyanosis; no peripheral edema  Musculoskeletal: NROM x4; no joint swelling, tenderness or erythema  Neurologic: AAOx3; CNII-XII grossly intact; no focal deficits  Psychiatric: affect and characteristics of appearance, verbalizations, behaviors are appropriate    MEDICATIONS  (STANDING):  apixaban 2.5 milliGRAM(s) Oral every 12 hours  aspirin enteric coated 81 milliGRAM(s) Oral daily  bacitracin   Ointment 1 Application(s) Topical two times a day  bisacodyl Suppository 10 milliGRAM(s) Rectal at bedtime  buPROPion XL (24-Hour) . 150 milliGRAM(s) Oral daily  chlorhexidine 2% Cloths 1 Application(s) Topical daily  cholecalciferol 1000 Unit(s) Oral daily  dextrose 5%. 1000 milliLiter(s) (50 mL/Hr) IV Continuous <Continuous>  dextrose 5%. 1000 milliLiter(s) (100 mL/Hr) IV Continuous <Continuous>  dextrose 50% Injectable 25 Gram(s) IV Push once  dextrose 50% Injectable 12.5 Gram(s) IV Push once  dextrose 50% Injectable 25 Gram(s) IV Push once  gabapentin 400 milliGRAM(s) Oral two times a day  glucagon  Injectable 1 milliGRAM(s) IntraMuscular once  insulin lispro (ADMELOG) corrective regimen sliding scale   SubCutaneous three times a day before meals  insulin lispro (ADMELOG) corrective regimen sliding scale   SubCutaneous at bedtime  metoprolol tartrate 25 milliGRAM(s) Oral two times a day  pantoprazole    Tablet 40 milliGRAM(s) Oral before breakfast  PARoxetine 10 milliGRAM(s) Oral daily  polyethylene glycol 3350 17 Gram(s) Oral daily  senna 2 Tablet(s) Oral at bedtime    MEDICATIONS  (PRN):  acetaminophen     Tablet .. 650 milliGRAM(s) Oral every 6 hours PRN Mild Pain (1 - 3)  bisacodyl 5 milliGRAM(s) Oral daily PRN Constipation  dextrose Oral Gel 15 Gram(s) Oral once PRN Blood Glucose LESS THAN 70 milliGRAM(s)/deciliter    LABS:                                   14.9   9.87  )-----------( 192      ( 18 Jan 2025 06:25 )             47.2       01-18    142  |  105  |  46[H]  ----------------------------<  119[H]  4.2   |  21[L]  |  1.22    Ca    8.7      18 Jan 2025 06:25  Phos  4.3     01-17  Mg     2.50     01-18        Color: x / Appearance: x / SG: x / pH: x  Gluc: 156 mg/dL / Ketone: x  / Bili: x / Urobili: x   Blood: x / Protein: x / Nitrite: x   Leuk Esterase: x / RBC: x / WBC x   Sq Epi: x / Non Sq Epi: x / Bacteria: x      CAPILLARY BLOOD GLUCOSE      POCT Blood Glucose.: 147 mg/dL (17 Jan 2025 11:32)      RADIOLOGY & ADDITIONAL TESTS: Reviewed.

## 2025-01-20 LAB
ANION GAP SERPL CALC-SCNC: 16 MMOL/L — HIGH (ref 7–14)
BUN SERPL-MCNC: 38 MG/DL — HIGH (ref 7–23)
CALCIUM SERPL-MCNC: 8.7 MG/DL — SIGNIFICANT CHANGE UP (ref 8.4–10.5)
CHLORIDE SERPL-SCNC: 106 MMOL/L — SIGNIFICANT CHANGE UP (ref 98–107)
CO2 SERPL-SCNC: 22 MMOL/L — SIGNIFICANT CHANGE UP (ref 22–31)
CREAT SERPL-MCNC: 0.82 MG/DL — SIGNIFICANT CHANGE UP (ref 0.5–1.3)
EGFR: 68 ML/MIN/1.73M2 — SIGNIFICANT CHANGE UP
GLUCOSE BLDC GLUCOMTR-MCNC: 134 MG/DL — HIGH (ref 70–99)
GLUCOSE BLDC GLUCOMTR-MCNC: 160 MG/DL — HIGH (ref 70–99)
GLUCOSE BLDC GLUCOMTR-MCNC: 177 MG/DL — HIGH (ref 70–99)
GLUCOSE BLDC GLUCOMTR-MCNC: 197 MG/DL — HIGH (ref 70–99)
GLUCOSE SERPL-MCNC: 181 MG/DL — HIGH (ref 70–99)
HCT VFR BLD CALC: 43.4 % — SIGNIFICANT CHANGE UP (ref 34.5–45)
HGB BLD-MCNC: 13.7 G/DL — SIGNIFICANT CHANGE UP (ref 11.5–15.5)
MAGNESIUM SERPL-MCNC: 2.5 MG/DL — SIGNIFICANT CHANGE UP (ref 1.6–2.6)
MCHC RBC-ENTMCNC: 28.8 PG — SIGNIFICANT CHANGE UP (ref 27–34)
MCHC RBC-ENTMCNC: 31.6 G/DL — LOW (ref 32–36)
MCV RBC AUTO: 91.4 FL — SIGNIFICANT CHANGE UP (ref 80–100)
NRBC # BLD AUTO: 0 K/UL — SIGNIFICANT CHANGE UP (ref 0–0)
NRBC # BLD: 0 /100 WBCS — SIGNIFICANT CHANGE UP (ref 0–0)
NRBC # FLD: 0 K/UL — SIGNIFICANT CHANGE UP (ref 0–0)
NRBC BLD-RTO: 0 /100 WBCS — SIGNIFICANT CHANGE UP (ref 0–0)
PHOSPHATE SERPL-MCNC: 2.8 MG/DL — SIGNIFICANT CHANGE UP (ref 2.5–4.5)
PLATELET # BLD AUTO: 200 K/UL — SIGNIFICANT CHANGE UP (ref 150–400)
POTASSIUM SERPL-MCNC: 3.9 MMOL/L — SIGNIFICANT CHANGE UP (ref 3.5–5.3)
POTASSIUM SERPL-SCNC: 3.9 MMOL/L — SIGNIFICANT CHANGE UP (ref 3.5–5.3)
RBC # BLD: 4.75 M/UL — SIGNIFICANT CHANGE UP (ref 3.8–5.2)
RBC # FLD: 14.6 % — HIGH (ref 10.3–14.5)
SODIUM SERPL-SCNC: 144 MMOL/L — SIGNIFICANT CHANGE UP (ref 135–145)
VIT B1 SERPL-MCNC: 144.5 NMOL/L — SIGNIFICANT CHANGE UP (ref 66.5–200)
WBC # BLD: 7.3 K/UL — SIGNIFICANT CHANGE UP (ref 3.8–10.5)
WBC # FLD AUTO: 7.3 K/UL — SIGNIFICANT CHANGE UP (ref 3.8–10.5)

## 2025-01-20 PROCEDURE — 99232 SBSQ HOSP IP/OBS MODERATE 35: CPT

## 2025-01-20 RX ORDER — BISACODYL 5 MG
10 TABLET, DELAYED RELEASE (ENTERIC COATED) ORAL DAILY
Refills: 0 | Status: DISCONTINUED | OUTPATIENT
Start: 2025-01-20 | End: 2025-01-21

## 2025-01-20 RX ORDER — DAPAGLIFLOZIN 5 MG/1
10 TABLET, FILM COATED ORAL DAILY
Refills: 0 | Status: DISCONTINUED | OUTPATIENT
Start: 2025-01-20 | End: 2025-01-22

## 2025-01-20 RX ADMIN — Medication 2 TABLET(S): at 21:25

## 2025-01-20 RX ADMIN — Medication 12.5 MILLIGRAM(S): at 17:40

## 2025-01-20 RX ADMIN — ASPIRIN 81 MILLIGRAM(S): 81 TABLET, COATED ORAL at 11:52

## 2025-01-20 RX ADMIN — Medication 1 ENEMA: at 16:48

## 2025-01-20 RX ADMIN — GABAPENTIN 400 MILLIGRAM(S): 800 TABLET ORAL at 17:40

## 2025-01-20 RX ADMIN — GABAPENTIN 400 MILLIGRAM(S): 800 TABLET ORAL at 05:07

## 2025-01-20 RX ADMIN — POLYETHYLENE GLYCOL 3350 17 GRAM(S): 17 POWDER, FOR SOLUTION ORAL at 11:51

## 2025-01-20 RX ADMIN — Medication 12.5 MILLIGRAM(S): at 05:07

## 2025-01-20 RX ADMIN — ANTISEPTIC SURGICAL SCRUB 1 APPLICATION(S): 0.04 SOLUTION TOPICAL at 11:51

## 2025-01-20 RX ADMIN — Medication 1 APPLICATION(S): at 17:39

## 2025-01-20 RX ADMIN — DAPAGLIFLOZIN 10 MILLIGRAM(S): 5 TABLET, FILM COATED ORAL at 14:05

## 2025-01-20 RX ADMIN — APIXABAN 2.5 MILLIGRAM(S): 5 TABLET, FILM COATED ORAL at 05:07

## 2025-01-20 RX ADMIN — APIXABAN 2.5 MILLIGRAM(S): 5 TABLET, FILM COATED ORAL at 17:40

## 2025-01-20 RX ADMIN — Medication 1: at 07:53

## 2025-01-20 RX ADMIN — Medication 10 MILLIGRAM(S): at 12:01

## 2025-01-20 RX ADMIN — Medication 1 APPLICATION(S): at 05:07

## 2025-01-20 RX ADMIN — BUPROPION HYDROCHLORIDE 150 MILLIGRAM(S): 150 TABLET, EXTENDED RELEASE ORAL at 11:51

## 2025-01-20 RX ADMIN — PAROXETINE HYDROCHLORIDE 10 MILLIGRAM(S): 10 TABLET, FILM COATED ORAL at 11:52

## 2025-01-20 RX ADMIN — Medication 1000 UNIT(S): at 11:51

## 2025-01-20 RX ADMIN — Medication 1: at 17:38

## 2025-01-20 RX ADMIN — PANTOPRAZOLE 40 MILLIGRAM(S): 20 TABLET, DELAYED RELEASE ORAL at 05:07

## 2025-01-20 NOTE — PROGRESS NOTE ADULT - PROBLEM SELECTOR PLAN 2
- pt p/w a-fib -140s, Now Bradycardic.   - known hx a-fib on Eliquis 2.5mg BID and Toprol 25mg daily  - s/p digoxin and lopressor in ER --> now rate controlled 80-90s  - EP following, appreciate recs.  - On Lopressor 25 mg BID- decreased to 12.5 mg BID d/t konstantin  - monitor on tele.

## 2025-01-20 NOTE — PROGRESS NOTE ADULT - PROBLEM SELECTOR PLAN 1
- AHRF on arrival has now resolved.   - home meds: farxiga 10mg daily, lasix 20mg T/R/Sat and 40mg MWF/Sun, toprol 25mg daily, spironolactone 25mg daily  - TTE with EF 71%, LA severe dil, and mod MV stenosis.  - Initially diuresed w IV Lasix- Sub seq held d/t JAY- Now resolved.   - cards consulted, recommending holding diuresis and GDMT until Cr resolves; s/o.  - Restarted on Spironolactone 12.5 mg daily. Farxiga 10 mg daily, Lasix 20 mg daily- On 1/10/25.   Increased GDMT as tolerated. - AHRF on arrival has now resolved.   - home meds: farxiga 10mg daily, lasix 20mg T/R/Sat and 40mg MWF/Sun, toprol 25mg daily, spironolactone 25mg daily  - TTE with EF 71%, LA severe dil, and mod MV stenosis.  - Initially diuresed w IV Lasix- Sub seq held d/t JAY- Now resolved.   - cards consulted, recommending holding diuresis and GDMT until Cr resolves; s/o.  - Restarted on Spironolactone 12.5 mg daily. Farxiga 10 mg daily, Lasix 20 mg daily- On 1/20/25. Increased GDMT as tolerated.

## 2025-01-20 NOTE — PROVIDER CONTACT NOTE (OTHER) - ASSESSMENT
Patient complains of Constipation- Scheduled and PRN meds given, No  BM   LBM 1/15
Patient is currently asymptomatic with no complaints of chest pain or discomfort. Patient resting comfortably in bed. VSS and in flowsheet.

## 2025-01-20 NOTE — PROGRESS NOTE ADULT - SUBJECTIVE AND OBJECTIVE BOX
Mervin Tripathi M.D.  Division of Hospital Medicine   Available via MS Teams    Patient is a 89y old  Female who presents with a chief complaint of Increasing shortness of breath (19 Jan 2025 09:56)      SUBJECTIVE / OVERNIGHT EVENTS:  HR 50-60's.  Now new events ON.         MEDICATIONS  (STANDING):  apixaban 2.5 milliGRAM(s) Oral every 12 hours  aspirin enteric coated 81 milliGRAM(s) Oral daily  bacitracin   Ointment 1 Application(s) Topical two times a day  buPROPion XL (24-Hour) . 150 milliGRAM(s) Oral daily  chlorhexidine 2% Cloths 1 Application(s) Topical daily  cholecalciferol 1000 Unit(s) Oral daily  dapagliflozin 10 milliGRAM(s) Oral daily  dextrose 5%. 1000 milliLiter(s) (50 mL/Hr) IV Continuous <Continuous>  dextrose 5%. 1000 milliLiter(s) (100 mL/Hr) IV Continuous <Continuous>  dextrose 50% Injectable 25 Gram(s) IV Push once  dextrose 50% Injectable 12.5 Gram(s) IV Push once  dextrose 50% Injectable 25 Gram(s) IV Push once  gabapentin 400 milliGRAM(s) Oral two times a day  glucagon  Injectable 1 milliGRAM(s) IntraMuscular once  insulin lispro (ADMELOG) corrective regimen sliding scale   SubCutaneous three times a day before meals  insulin lispro (ADMELOG) corrective regimen sliding scale   SubCutaneous at bedtime  metoprolol tartrate 12.5 milliGRAM(s) Oral two times a day  pantoprazole    Tablet 40 milliGRAM(s) Oral before breakfast  PARoxetine 10 milliGRAM(s) Oral daily  polyethylene glycol 3350 17 Gram(s) Oral daily  senna 2 Tablet(s) Oral at bedtime  spironolactone 12.5 milliGRAM(s) Oral daily    MEDICATIONS  (PRN):  acetaminophen     Tablet .. 650 milliGRAM(s) Oral every 6 hours PRN Mild Pain (1 - 3)  bisacodyl 5 milliGRAM(s) Oral daily PRN Constipation  dextrose Oral Gel 15 Gram(s) Oral once PRN Blood Glucose LESS THAN 70 milliGRAM(s)/deciliter      CAPILLARY BLOOD GLUCOSE      POCT Blood Glucose.: 160 mg/dL (20 Jan 2025 07:52)  POCT Blood Glucose.: 147 mg/dL (19 Jan 2025 20:54)  POCT Blood Glucose.: 192 mg/dL (19 Jan 2025 16:44)  POCT Blood Glucose.: 126 mg/dL (19 Jan 2025 12:00)    I&O's Summary      PHYSICAL EXAM:  Vital Signs Last 24 Hrs  T(C): 36.7 (20 Jan 2025 09:00), Max: 37.1 (19 Jan 2025 21:50)  T(F): 98.1 (20 Jan 2025 09:00), Max: 98.8 (19 Jan 2025 21:50)  HR: 55 (20 Jan 2025 09:00) (55 - 76)  BP: 118/57 (20 Jan 2025 09:00) (118/57 - 136/96)  BP(mean): --  RR: 18 (20 Jan 2025 09:00) (18 - 18)  SpO2: 100% (20 Jan 2025 09:00) (95% - 100%)    Parameters below as of 20 Jan 2025 09:00  Patient On (Oxygen Delivery Method): room air    Constitutional: resting comfortably in bed; NAD.  Respiratory: bibasilar crackles BL; comfortable on RA without respiratory distress  Cardiac: +S1/S2; irregularly irregular in 90s; no M/R/G  Gastrointestinal: soft, NT/ND; no rebound or guarding; +BSx4  Extremities: WWP, no clubbing or cyanosis; no peripheral edema  Musculoskeletal: NROM x4; no joint swelling, tenderness or erythema  Neurologic: AAOx3; CNII-XII grossly intact; no focal deficits  Psychiatric: affect and characteristics of appearance, verbalizations, behaviors are appropriate    LABS:                        13.7   7.30  )-----------( 200      ( 20 Jan 2025 05:32 )             43.4     01-20    144  |  106  |  38[H]  ----------------------------<  181[H]  3.9   |  22  |  0.82    Ca    8.7      20 Jan 2025 05:32  Phos  2.8     01-20  Mg     2.50     01-20            Urinalysis Basic - ( 20 Jan 2025 05:32 )    Color: x / Appearance: x / SG: x / pH: x  Gluc: 181 mg/dL / Ketone: x  / Bili: x / Urobili: x   Blood: x / Protein: x / Nitrite: x   Leuk Esterase: x / RBC: x / WBC x   Sq Epi: x / Non Sq Epi: x / Bacteria: x          RADIOLOGY & ADDITIONAL TESTS:  Results Reviewed:   Imaging Personally Reviewed:  Electrocardiogram Personally Reviewed:    COORDINATION OF CARE:  Care Discussed with Consultants/Other Providers [Y/N]:  Prior or Outpatient Records Reviewed [Y/N]:

## 2025-01-21 LAB
ANION GAP SERPL CALC-SCNC: 12 MMOL/L — SIGNIFICANT CHANGE UP (ref 7–14)
BUN SERPL-MCNC: 35 MG/DL — HIGH (ref 7–23)
CALCIUM SERPL-MCNC: 8.5 MG/DL — SIGNIFICANT CHANGE UP (ref 8.4–10.5)
CHLORIDE SERPL-SCNC: 106 MMOL/L — SIGNIFICANT CHANGE UP (ref 98–107)
CO2 SERPL-SCNC: 20 MMOL/L — LOW (ref 22–31)
CREAT SERPL-MCNC: 0.75 MG/DL — SIGNIFICANT CHANGE UP (ref 0.5–1.3)
EGFR: 76 ML/MIN/1.73M2 — SIGNIFICANT CHANGE UP
GLUCOSE BLDC GLUCOMTR-MCNC: 115 MG/DL — HIGH (ref 70–99)
GLUCOSE BLDC GLUCOMTR-MCNC: 125 MG/DL — HIGH (ref 70–99)
GLUCOSE BLDC GLUCOMTR-MCNC: 161 MG/DL — HIGH (ref 70–99)
GLUCOSE BLDC GLUCOMTR-MCNC: 181 MG/DL — HIGH (ref 70–99)
GLUCOSE SERPL-MCNC: 115 MG/DL — HIGH (ref 70–99)
HCT VFR BLD CALC: 40.1 % — SIGNIFICANT CHANGE UP (ref 34.5–45)
HGB BLD-MCNC: 12.6 G/DL — SIGNIFICANT CHANGE UP (ref 11.5–15.5)
MAGNESIUM SERPL-MCNC: 2.3 MG/DL — SIGNIFICANT CHANGE UP (ref 1.6–2.6)
MCHC RBC-ENTMCNC: 29.4 PG — SIGNIFICANT CHANGE UP (ref 27–34)
MCHC RBC-ENTMCNC: 31.4 G/DL — LOW (ref 32–36)
MCV RBC AUTO: 93.7 FL — SIGNIFICANT CHANGE UP (ref 80–100)
NRBC # BLD AUTO: 0 K/UL — SIGNIFICANT CHANGE UP (ref 0–0)
NRBC # BLD: 0 /100 WBCS — SIGNIFICANT CHANGE UP (ref 0–0)
NRBC # FLD: 0 K/UL — SIGNIFICANT CHANGE UP (ref 0–0)
NRBC BLD-RTO: 0 /100 WBCS — SIGNIFICANT CHANGE UP (ref 0–0)
PHOSPHATE SERPL-MCNC: 2.8 MG/DL — SIGNIFICANT CHANGE UP (ref 2.5–4.5)
PLATELET # BLD AUTO: 175 K/UL — SIGNIFICANT CHANGE UP (ref 150–400)
POTASSIUM SERPL-MCNC: 4.1 MMOL/L — SIGNIFICANT CHANGE UP (ref 3.5–5.3)
POTASSIUM SERPL-SCNC: 4.1 MMOL/L — SIGNIFICANT CHANGE UP (ref 3.5–5.3)
RBC # BLD: 4.28 M/UL — SIGNIFICANT CHANGE UP (ref 3.8–5.2)
RBC # FLD: 14.7 % — HIGH (ref 10.3–14.5)
SODIUM SERPL-SCNC: 138 MMOL/L — SIGNIFICANT CHANGE UP (ref 135–145)
WBC # BLD: 7.91 K/UL — SIGNIFICANT CHANGE UP (ref 3.8–10.5)
WBC # FLD AUTO: 7.91 K/UL — SIGNIFICANT CHANGE UP (ref 3.8–10.5)

## 2025-01-21 PROCEDURE — 99232 SBSQ HOSP IP/OBS MODERATE 35: CPT

## 2025-01-21 RX ADMIN — ASPIRIN 81 MILLIGRAM(S): 81 TABLET, COATED ORAL at 11:49

## 2025-01-21 RX ADMIN — PANTOPRAZOLE 40 MILLIGRAM(S): 20 TABLET, DELAYED RELEASE ORAL at 05:36

## 2025-01-21 RX ADMIN — APIXABAN 2.5 MILLIGRAM(S): 5 TABLET, FILM COATED ORAL at 17:32

## 2025-01-21 RX ADMIN — Medication 12.5 MILLIGRAM(S): at 05:35

## 2025-01-21 RX ADMIN — Medication 1: at 11:46

## 2025-01-21 RX ADMIN — Medication 1 APPLICATION(S): at 05:36

## 2025-01-21 RX ADMIN — APIXABAN 2.5 MILLIGRAM(S): 5 TABLET, FILM COATED ORAL at 05:36

## 2025-01-21 RX ADMIN — Medication 1000 UNIT(S): at 11:48

## 2025-01-21 RX ADMIN — DAPAGLIFLOZIN 10 MILLIGRAM(S): 5 TABLET, FILM COATED ORAL at 11:49

## 2025-01-21 RX ADMIN — GABAPENTIN 400 MILLIGRAM(S): 800 TABLET ORAL at 05:35

## 2025-01-21 RX ADMIN — PAROXETINE HYDROCHLORIDE 10 MILLIGRAM(S): 10 TABLET, FILM COATED ORAL at 11:49

## 2025-01-21 RX ADMIN — ANTISEPTIC SURGICAL SCRUB 1 APPLICATION(S): 0.04 SOLUTION TOPICAL at 11:49

## 2025-01-21 RX ADMIN — Medication 1 APPLICATION(S): at 17:32

## 2025-01-21 RX ADMIN — GABAPENTIN 400 MILLIGRAM(S): 800 TABLET ORAL at 17:32

## 2025-01-21 RX ADMIN — BUPROPION HYDROCHLORIDE 150 MILLIGRAM(S): 150 TABLET, EXTENDED RELEASE ORAL at 11:49

## 2025-01-21 NOTE — PROGRESS NOTE ADULT - SUBJECTIVE AND OBJECTIVE BOX
[facility] Department of Hospital Medicine  Mecca Montoya DO  Available on MS Teams  Pager: [phone]    Patient is a 89y old  Female who presents with a chief complaint of Increasing shortness of breath (15 Tashi 2025 21:41)    Subjective:  Pt seen and examined at bedside resting comfortably. Says she feels much better than last week. Understands that we are waiting for insurance auth for AMOL. Denies acute complaints. Says she's eating/drinking well and had 2 BMs since the enema yesterday.     Vital Signs Last 24 Hrs  T(C): 36.7 (21 Jan 2025 13:30), Max: 36.8 (20 Jan 2025 17:35)  T(F): 98 (21 Jan 2025 13:30), Max: 98.2 (20 Jan 2025 17:35)  HR: 55 (21 Jan 2025 13:30) (55 - 62)  BP: 132/70 (21 Jan 2025 13:30) (121/55 - 141/68)  BP(mean): --  RR: 16 (21 Jan 2025 13:30) (16 - 19)  SpO2: 97% (21 Jan 2025 13:30) (96% - 100%)    Parameters below as of 21 Jan 2025 13:30  Patient On (Oxygen Delivery Method): room air    PHYSICAL EXAM:  Constitutional: resting comfortably in bed; NAD; elderly frail F   Head: NC/AT  Eyes: PERRL, EOMI, anicteric sclera  ENT: no nasal discharge; MMM  Neck: supple; no JVD  Respiratory: bibasilar crackles BL; comfortable on RA without respiratory distress  Cardiac: +S1/S2; irregularly irregular in 90s; no M/R/G  Gastrointestinal: soft, NT/ND; no rebound or guarding; +BSx4  Extremities: WWP, no clubbing or cyanosis; no peripheral edema  Musculoskeletal: NROM x4; no joint swelling, tenderness or erythema  Neurologic: AAOx3; CNII-XII grossly intact; no focal deficits  Psychiatric: affect and characteristics of appearance, verbalizations, behaviors are appropriate    MEDICATIONS  (STANDING):  apixaban 2.5 milliGRAM(s) Oral every 12 hours  aspirin enteric coated 81 milliGRAM(s) Oral daily  bacitracin   Ointment 1 Application(s) Topical two times a day  buPROPion XL (24-Hour) . 150 milliGRAM(s) Oral daily  chlorhexidine 2% Cloths 1 Application(s) Topical daily  cholecalciferol 1000 Unit(s) Oral daily  dapagliflozin 10 milliGRAM(s) Oral daily  dextrose 5%. 1000 milliLiter(s) (100 mL/Hr) IV Continuous <Continuous>  dextrose 5%. 1000 milliLiter(s) (50 mL/Hr) IV Continuous <Continuous>  dextrose 50% Injectable 25 Gram(s) IV Push once  dextrose 50% Injectable 12.5 Gram(s) IV Push once  dextrose 50% Injectable 25 Gram(s) IV Push once  gabapentin 400 milliGRAM(s) Oral two times a day  glucagon  Injectable 1 milliGRAM(s) IntraMuscular once  insulin lispro (ADMELOG) corrective regimen sliding scale   SubCutaneous three times a day before meals  insulin lispro (ADMELOG) corrective regimen sliding scale   SubCutaneous at bedtime  metoprolol tartrate 12.5 milliGRAM(s) Oral two times a day  pantoprazole    Tablet 40 milliGRAM(s) Oral before breakfast  PARoxetine 10 milliGRAM(s) Oral daily  polyethylene glycol 3350 17 Gram(s) Oral daily  spironolactone 12.5 milliGRAM(s) Oral daily    MEDICATIONS  (PRN):  acetaminophen     Tablet .. 650 milliGRAM(s) Oral every 6 hours PRN Mild Pain (1 - 3)  dextrose Oral Gel 15 Gram(s) Oral once PRN Blood Glucose LESS THAN 70 milliGRAM(s)/deciliter    LABS:                        12.6   7.91  )-----------( 175      ( 21 Jan 2025 04:09 )             40.1     01-21    138  |  106  |  35[H]  ----------------------------<  115[H]  4.1   |  20[L]  |  0.75    Ca    8.5      21 Jan 2025 04:09  Phos  2.8     01-21  Mg     2.30     01-21        Urinalysis Basic - ( 21 Jan 2025 04:09 )    Color: x / Appearance: x / SG: x / pH: x  Gluc: 115 mg/dL / Ketone: x  / Bili: x / Urobili: x   Blood: x / Protein: x / Nitrite: x   Leuk Esterase: x / RBC: x / WBC x   Sq Epi: x / Non Sq Epi: x / Bacteria: x      CAPILLARY BLOOD GLUCOSE      POCT Blood Glucose.: 161 mg/dL (21 Jan 2025 11:44)      RADIOLOGY & ADDITIONAL TESTS: Reviewed.

## 2025-01-21 NOTE — PROGRESS NOTE ADULT - PROBLEM SELECTOR PLAN 2
- pt p/w a-fib -140s  - known hx a-fib on eliquis 2.5mg BID and toprol 25mg daily  - s/p digoxin and lopressor in ER --> now rate controlled 80-90s  - EP following, appreciate recs  - c/w lopressor 50mg BID  - c/w home eliquis

## 2025-01-21 NOTE — PROGRESS NOTE ADULT - PROBLEM SELECTOR PROBLEM 4
JAY (acute kidney injury)

## 2025-01-21 NOTE — PROGRESS NOTE ADULT - PROBLEM SELECTOR PLAN 5
- A1C 7.4  - diet controlled  - monitor glucose on BMP

## 2025-01-21 NOTE — PROGRESS NOTE ADULT - PROBLEM SELECTOR PLAN 3
- pt without BM x few days despite laxatives at home; has had this issue in the past  - abd XR benign  - start bowel regimen - Miralax daily, senna qhs, daily dulcolax suppository, lactulose 20mg x1
- pt without BM x few days despite laxatives at home; has had this issue in the past  - abd XR benign  - start bowel regimen - miralax daily, senna qhs, daily dulcolax suppository, lactulose 20mg x1  - consider enema in AM if still without BM  - monitor BMs -- pt too sleepy to talk today;  unsure if pt had BM 1/16
- pt without BM x few days despite laxatives at home; has had this issue in the past  - abd XR benign  - start bowel regimen - miralax daily, senna qhs, daily dulcolax suppository, lactulose 20mg x1  - consider enema in AM if still without BM  - monitor BMs
- Aggressive Bowel regimen  - abd XR benign
- pt without BM x few days despite laxatives at home; has had this issue in the past  - abd XR benign  - start bowel regimen - Miralax daily, senna qhs, daily dulcolax suppository, lactulose 20mg x1
- pt without BM x few days despite laxatives at home; has had this issue in the past  - abd XR benign  - on bowel regimen; s/p fleet enema 1/20  - 2x BMs x24h   - de-escalate bowel regimen  - c/w daily miralax

## 2025-01-21 NOTE — PROGRESS NOTE ADULT - PROBLEM SELECTOR PLAN 1
- pt p/w SOB, difficulty ambulating, and hypoxia, referred from PCP to ER for evaluation  - chart review shows pt f/w cards Dr. Perdue and recently changed entresto to spironolactone d/t cost issues  - home meds: farxiga 10mg daily, lasix 20mg T/R/Sat and 40mg MWF/Sun, toprol 25mg daily, spironolactone 25mg daily  - compliant with meds  - on arrival, a-fib RVR and requiring 3L NC -- now rate controlled and on RA  - Cr elevated as below  - TSH WNL  - BNP 5092  - UA neg, COV/flu/RSV neg  - CXR c/w CHF  - TTE with EF 71%, LA sev dil, and mod MV stenosis  - cards consulted, recommending holding diuresis and GDMT until Cr resolves; s/o   - s/p lasix 40mg IV --> euvolemic; hold further diuresis   - strict I&O, daily weights

## 2025-01-21 NOTE — PROGRESS NOTE ADULT - PROBLEM SELECTOR PLAN 10
- F: none  - E: replete K<4, Mg<2  - N: DASH/TLC, consistent carb, 1.2L fluid restriction  - D: eliquis 2.5mg BID  - G: protonix 40mg daily    code: full  dispo: pending medical optimization and PT eval
- F: none  - E: replete K<4, Mg<2  - N: DASH/TLC, consistent carb, 1.2L fluid restriction  - D: eliquis 2.5mg BID  - G: protonix 40mg daily    code: full  dispo: pending medical optimization and PT eval
- F: none  - E: replete K<4, Mg<2  - N: DASH/TLC, consistent carb, 1.2L fluid restriction  - D: eliquis 2.5mg BID  - G: protonix 40mg daily    code: full  dispo: medically optimized; PT recs AMOL - pending auth
- F: none  - E: replete K<4, Mg<2  - N: DASH/TLC, consistent carb, 1.2L fluid restriction  - D: eliquis 2.5mg BID  - G: protonix 40mg daily    code: full  dispo: pending medical optimization and PT eval
- F: none  - E: replete K<4, Mg<2  - N: DASH/TLC, consistent carb, 1.2L fluid restriction  - D: eliquis 2.5mg BID  - G: protonix 40mg daily    code: full  dispo: AMOL now
- F: none  - E: replete K<4, Mg<2  - N: DASH/TLC, consistent carb, 1.2L fluid restriction  - D: eliquis 2.5mg BID  - G: protonix 40mg daily    code: full  dispo: AMOL now

## 2025-01-21 NOTE — PROGRESS NOTE ADULT - TIME BILLING
review of laboratory data, radiology results, consultants' recommendations, documentation in [place], discussion with patient/ACP and interdisciplinary staff (such as , social workers, etc). Interventions were performed as documented above.
review of labs, imaging, notes, discussion of plan with patient, family, and consultant
review of laboratory data, radiology results, consultants' recommendations, documentation in [place], discussion with patient/ACP and interdisciplinary staff (such as , social workers, etc). Interventions were performed as documented above.
review of laboratory data, radiology results, consultants' recommendations, documentation in [place], discussion with patient/ACP and interdisciplinary staff (such as , social workers, etc). Interventions were performed as documented above.

## 2025-01-21 NOTE — PROGRESS NOTE ADULT - PROBLEM SELECTOR PLAN 4
- resolved.  - CTM
- Cr 1.6 on arrival, now downtrending- 1.2 today  - suspect multifactorial in setting of poor PO intake and CHF exacerbation with likely cardiorenal component  - CTM
- Cr 1.6 on arrival, now downtrending  - suspect multifactorial in setting of poor PO intake and CHF exacerbation with likely cardiorenal component  - UA neg   - continue to trend  - avoid nephrotoxic agents and renally dose medications  - monitor I&O
- resolved.  - CTM
- Cr 1.6 on arrival, now downtrending  - suspect multifactorial in setting of poor PO intake and CHF exacerbation with likely cardiorenal component  - UA neg   - continue to trend  - avoid nephrotoxic agents and renally dose medications  - monitor I&O
- Cr 1.6 on arrival, now downtrending  - suspect multifactorial in setting of poor PO intake and CHF exacerbation with likely cardiorenal component  - UA neg   - continue to trend  - avoid nephrotoxic agents and renally dose medications  - monitor I&O

## 2025-01-21 NOTE — PROGRESS NOTE ADULT - PROBLEM SELECTOR PLAN 8
- c/w home med therapeutic exchange with protonix 40mg daily

## 2025-01-21 NOTE — PROGRESS NOTE ADULT - REASON FOR ADMISSION
Increasing shortness of breath

## 2025-01-21 NOTE — PROGRESS NOTE ADULT - PROBLEM SELECTOR PLAN 6
- home meds: amlodipine 5mg daily, lasix 20mg T/R/Sat and 40mg MWF/Sun, toprol 25mg daily  - lopressor as above
- home meds: amlodipine 5mg daily, lasix 20mg T/R/Sat and 40mg MWF/Sun, toprol 25mg daily  - start lopressor 25mg BID  - s/p IV lasix in ER -- f/u cards recs for further dosing
- home meds: amlodipine 5mg daily, lasix 20mg T/R/Sat and 40mg MWF/Sun, toprol 25mg daily  - lopressor as above  - lasix as above
- home meds: amlodipine 5mg daily, lasix 20mg T/R/Sat and 40mg MWF/Sun, toprol 25mg daily  - lopressor as above
- home meds: amlodipine 5mg daily, lasix 20mg T/R/Sat and 40mg MWF/Sun, toprol 25mg daily  - lopressor as above  - lasix as above
- home meds: amlodipine 5mg daily, lasix 20mg T/R/Sat and 40mg MWF/Sun, toprol 25mg daily  - lopressor, spironolactone and Lasix as above

## 2025-01-21 NOTE — PROGRESS NOTE ADULT - PROBLEM SELECTOR PLAN 7
- off atorvastatin per outpt records  - 

## 2025-01-21 NOTE — PROGRESS NOTE ADULT - PROBLEM SELECTOR PLAN 9
- not in acute exacerbation  - goal O2sat 88-92%

## 2025-01-21 NOTE — PROGRESS NOTE ADULT - PROBLEM SELECTOR PROBLEM 1
Acute on chronic heart failure

## 2025-01-21 NOTE — PROGRESS NOTE ADULT - ASSESSMENT
89-year-old female, with past history significant for HFpEF, moderate MS, moderate MR,  Atrial fibrillation on Eliquis, HTN, HLD, DM, COPD, Ex-smoker, prior PE, MDD, presented to the ED secondary to persistent shortness of breath. She was found to be in AF w/ RVR, now in sinus konstantin. Remains HDS.      CHADSVASC 5-6  Dose reduced Eliquis (Age >80, weight < 60 kg, and Cr >1.5)    Rec  - Hold metoprolol for HR <55  - Eliquis 2.5 mg BID  - No indication for digoxin at this time    Please see attending attestation for final recommendations        Bob Mccormick MD  Cardiology Fellow     All Cardiology service information can be found 24/7 on [email or web address], password: [email or web address]  
Pt is an 88 yo F with PMH a-fib (eliquis), CHF, HTN, HLD, PE (s/p tx), GERD, COPD/asthma, former TUD, MDD, and vitamin D deficiency p/w SOB, difficulty ambulating, weight loss, and constipation. Was evaluated by PCP and referred to ER d/t hypoxia to 90% on RA. On arrival, -140s and requiring 3L NC; EKG a-fib RVR without ischemia. Labs with JAY and elevated BNP. CXR with perihilar haziness and L effusion c/w CHF, XR abd neg, TTE with EF 71%, LA sev dil, and mod MV stenosis. Now weaned to RA. Received digoxin and lasix IV; now rate controlled. EP and cards consulted with recs pending. 
Pt is an 89 yo F with PMH a-fib (eliquis), CHF, HTN, HLD, PE (s/p tx), GERD, COPD/asthma, former TUD, MDD, and vitamin D deficiency p/w SOB, difficulty ambulating, weight loss, and constipation. Was evaluated by PCP and referred to ER d/t hypoxia to 90% on RA. On arrival, -140s and requiring 3L NC; EKG a-fib RVR without ischemia. Labs with JAY and elevated BNP. CXR with perihilar haziness and L effusion c/w CHF, XR abd neg, TTE with EF 71%, LA sev dil, and mod MV stenosis. Now weaned to RA. Received digoxin and lasix IV; now rate controlled and on lopressor. EP following and cards s/o; monitoring on tele. Pending PT eval. 
Pt is an 89 yo F with PMH a-fib (eliquis), CHF, HTN, HLD, PE (s/p tx), GERD, COPD/asthma, former TUD, MDD, and vitamin D deficiency p/w SOB, difficulty ambulating, weight loss, and constipation. Was evaluated by PCP and referred to ER d/t hypoxia to 90% on RA. On arrival, -140s and requiring 3L NC; EKG a-fib RVR without ischemia. Labs with JAY and elevated BNP. CXR with perihilar haziness and L effusion c/w CHF, XR abd neg, TTE with EF 71%, LA sev dil, and mod MV stenosis. Now weaned to RA. Received digoxin and lasix IV; now rate controlled and on lopressor. EP following and cards s/o; on eliquis. PT recs AMOL, pending auth.
Pt is an 89 yo F with PMH a-fib (eliquis), CHF, HTN, HLD, PE (s/p tx), GERD, COPD/asthma, former TUD, MDD, and vitamin D deficiency p/w SOB, difficulty ambulating, weight loss, and constipation. Was evaluated by PCP and referred to ER d/t hypoxia to 90% on RA. On arrival, -140s and requiring 3L NC; EKG a-fib RVR without ischemia. Labs with JAY and elevated BNP. CXR with perihilar haziness and L effusion c/w CHF, XR abd neg, TTE with EF 71%, LA sev dil, and mod MV stenosis. Now weaned to RA. Received digoxin and lasix IV; now rate controlled and on lopressor. EP following and cards s/o; monitoring on tele. Pending PT eval.

## 2025-01-22 ENCOUNTER — TRANSCRIPTION ENCOUNTER (OUTPATIENT)
Age: 89
End: 2025-01-22

## 2025-01-22 VITALS
DIASTOLIC BLOOD PRESSURE: 73 MMHG | SYSTOLIC BLOOD PRESSURE: 131 MMHG | RESPIRATION RATE: 17 BRPM | OXYGEN SATURATION: 98 % | TEMPERATURE: 98 F | HEART RATE: 68 BPM

## 2025-01-22 LAB
GLUCOSE BLDC GLUCOMTR-MCNC: 104 MG/DL — HIGH (ref 70–99)
GLUCOSE BLDC GLUCOMTR-MCNC: 122 MG/DL — HIGH (ref 70–99)

## 2025-01-22 PROCEDURE — 99239 HOSP IP/OBS DSCHRG MGMT >30: CPT

## 2025-01-22 RX ORDER — METOPROLOL SUCCINATE 25 MG
0.5 TABLET, EXTENDED RELEASE 24 HR ORAL
Qty: 0 | Refills: 0 | DISCHARGE
Start: 2025-01-22

## 2025-01-22 RX ORDER — BUPROPION HYDROCHLORIDE 150 MG/1
1 TABLET, EXTENDED RELEASE ORAL
Qty: 0 | Refills: 0 | DISCHARGE
Start: 2025-01-22

## 2025-01-22 RX ORDER — POLYETHYLENE GLYCOL 3350 17 G/17G
17 POWDER, FOR SOLUTION ORAL
Qty: 0 | Refills: 0 | DISCHARGE
Start: 2025-01-22

## 2025-01-22 RX ORDER — METOPROLOL SUCCINATE 25 MG
0.5 TABLET, EXTENDED RELEASE 24 HR ORAL
Refills: 0 | DISCHARGE

## 2025-01-22 RX ORDER — ASPIRIN 81 MG/1
1 TABLET, COATED ORAL
Qty: 0 | Refills: 0 | DISCHARGE
Start: 2025-01-22

## 2025-01-22 RX ORDER — PANTOPRAZOLE 20 MG/1
1 TABLET, DELAYED RELEASE ORAL
Qty: 0 | Refills: 0 | DISCHARGE
Start: 2025-01-22

## 2025-01-22 RX ORDER — AMLODIPINE BESYLATE 5 MG
1 TABLET ORAL
Refills: 0 | DISCHARGE

## 2025-01-22 RX ORDER — ACETAMINOPHEN 160 MG/5ML
2 SUSPENSION ORAL
Qty: 0 | Refills: 0 | DISCHARGE
Start: 2025-01-22

## 2025-01-22 RX ADMIN — GABAPENTIN 400 MILLIGRAM(S): 800 TABLET ORAL at 06:54

## 2025-01-22 RX ADMIN — PANTOPRAZOLE 40 MILLIGRAM(S): 20 TABLET, DELAYED RELEASE ORAL at 06:53

## 2025-01-22 RX ADMIN — Medication 1000 UNIT(S): at 11:12

## 2025-01-22 RX ADMIN — Medication 12.5 MILLIGRAM(S): at 06:53

## 2025-01-22 RX ADMIN — Medication 1 APPLICATION(S): at 06:54

## 2025-01-22 RX ADMIN — ASPIRIN 81 MILLIGRAM(S): 81 TABLET, COATED ORAL at 11:12

## 2025-01-22 RX ADMIN — BUPROPION HYDROCHLORIDE 150 MILLIGRAM(S): 150 TABLET, EXTENDED RELEASE ORAL at 11:12

## 2025-01-22 RX ADMIN — DAPAGLIFLOZIN 10 MILLIGRAM(S): 5 TABLET, FILM COATED ORAL at 11:11

## 2025-01-22 RX ADMIN — POLYETHYLENE GLYCOL 3350 17 GRAM(S): 17 POWDER, FOR SOLUTION ORAL at 11:13

## 2025-01-22 RX ADMIN — PAROXETINE HYDROCHLORIDE 10 MILLIGRAM(S): 10 TABLET, FILM COATED ORAL at 11:12

## 2025-01-22 RX ADMIN — APIXABAN 2.5 MILLIGRAM(S): 5 TABLET, FILM COATED ORAL at 06:54

## 2025-01-22 RX ADMIN — ANTISEPTIC SURGICAL SCRUB 1 APPLICATION(S): 0.04 SOLUTION TOPICAL at 11:13

## 2025-01-22 NOTE — DISCHARGE NOTE NURSING/CASE MANAGEMENT/SOCIAL WORK - FINANCIAL ASSISTANCE
[facility] provides services at a reduced cost to those who are determined to be eligible through [facility]’s financial assistance program. Information regarding [facility]’s financial assistance program can be found by going to [email or web address] or by calling [phone].

## 2025-01-22 NOTE — DISCHARGE NOTE PROVIDER - NSDCADMDATE_GEN_ALL_CORE_FT
Tazorac Counseling:  Patient advised that medication is irritating and drying.  Patient may need to apply sparingly and wash off after an hour before eventually leaving it on overnight.  The patient verbalized understanding of the proper use and possible adverse effects of tazorac.  All of the patient's questions and concerns were addressed. Azithromycin Counseling:  I discussed with the patient the risks of azithromycin including but not limited to GI upset, allergic reaction, drug rash, diarrhea, and yeast infections. Minocycline Counseling: Patient advised regarding possible photosensitivity and discoloration of the teeth, skin, lips, tongue and gums.  Patient instructed to avoid sunlight, if possible.  When exposed to sunlight, patients should wear protective clothing, sunglasses, and sunscreen.  The patient was instructed to call the office immediately if the following severe adverse effects occur:  hearing changes, easy bruising/bleeding, severe headache, or vision changes.  The patient verbalized understanding of the proper use and possible adverse effects of minocycline.  All of the patient's questions and concerns were addressed. Doxycycline Pregnancy And Lactation Text: This medication is Pregnancy Category D and not consider safe during pregnancy. It is also excreted in breast milk but is considered safe for shorter treatment courses. Azelaic Acid Pregnancy And Lactation Text: This medication is considered safe during pregnancy and breast feeding. Sarecycline Counseling: Patient advised regarding possible photosensitivity and discoloration of the teeth, skin, lips, tongue and gums.  Patient instructed to avoid sunlight, if possible.  When exposed to sunlight, patients should wear protective clothing, sunglasses, and sunscreen.  The patient was instructed to call the office immediately if the following severe adverse effects occur:  hearing changes, easy bruising/bleeding, severe headache, or vision changes.  The patient verbalized understanding of the proper use and possible adverse effects of sarecycline.  All of the patient's questions and concerns were addressed. Aklief Pregnancy And Lactation Text: It is unknown if this medication is safe to use during pregnancy.  It is unknown if this medication is excreted in breast milk.  Breastfeeding women should use the topical cream on the smallest area of the skin for the shortest time needed while breastfeeding.  Do not apply to nipple and areola. Erythromycin Pregnancy And Lactation Text: This medication is Pregnancy Category B and is considered safe during pregnancy. It is also excreted in breast milk. 15-Tashi-2025 20:32 Topical Clindamycin Counseling: Patient counseled that this medication may cause skin irritation or allergic reactions.  In the event of skin irritation, the patient was advised to reduce the amount of the drug applied or use it less frequently.   The patient verbalized understanding of the proper use and possible adverse effects of clindamycin.  All of the patient's questions and concerns were addressed. Include Pregnancy/Lactation Warning?: No Benzoyl Peroxide Counseling: Patient counseled that medicine may cause skin irritation and bleach clothing.  In the event of skin irritation, the patient was advised to reduce the amount of the drug applied or use it less frequently.   The patient verbalized understanding of the proper use and possible adverse effects of benzoyl peroxide.  All of the patient's questions and concerns were addressed. Topical Sulfur Applications Pregnancy And Lactation Text: This medication is Pregnancy Category C and has an unknown safety profile during pregnancy. It is unknown if this topical medication is excreted in breast milk. Isotretinoin Counseling: Patient should get monthly blood tests, not donate blood, not drive at night if vision affected, not share medication, and not undergo elective surgery for 6 months after tx completed. Side effects reviewed, pt to contact office should one occur. Winlevi Pregnancy And Lactation Text: This medication is considered safe during pregnancy and breastfeeding. Birth Control Pills Pregnancy And Lactation Text: This medication should be avoided if pregnant and for the first 30 days post-partum. Spironolactone Pregnancy And Lactation Text: This medication can cause feminization of the male fetus and should be avoided during pregnancy. The active metabolite is also found in breast milk. Detail Level: Detailed Dapsone Pregnancy And Lactation Text: This medication is Pregnancy Category C and is not considered safe during pregnancy or breast feeding. High Dose Vitamin A Counseling: Side effects reviewed, pt to contact office should one occur. Tetracycline Pregnancy And Lactation Text: This medication is Pregnancy Category D and not consider safe during pregnancy. It is also excreted in breast milk. Topical Retinoid counseling:  Patient advised to apply a pea-sized amount only at bedtime and wait 30 minutes after washing their face before applying.  If too drying, patient may add a non-comedogenic moisturizer. The patient verbalized understanding of the proper use and possible adverse effects of retinoids.  All of the patient's questions and concerns were addressed. Aklief counseling:  Patient advised to apply a pea-sized amount only at bedtime and wait 30 minutes after washing their face before applying.  If too drying, patient may add a non-comedogenic moisturizer.  The most commonly reported side effects including irritation, redness, scaling, dryness, stinging, burning, itching, and increased risk of sunburn.  The patient verbalized understanding of the proper use and possible adverse effects of retinoids.  All of the patient's questions and concerns were addressed. Dapsone Counseling: I discussed with the patient the risks of dapsone including but not limited to hemolytic anemia, agranulocytosis, rashes, methemoglobinemia, kidney failure, peripheral neuropathy, headaches, GI upset, and liver toxicity.  Patients who start dapsone require monitoring including baseline LFTs and weekly CBCs for the first month, then every month thereafter.  The patient verbalized understanding of the proper use and possible adverse effects of dapsone.  All of the patient's questions and concerns were addressed. Tazorac Pregnancy And Lactation Text: This medication is not safe during pregnancy. It is unknown if this medication is excreted in breast milk. Doxycycline Counseling:  Patient counseled regarding possible photosensitivity and increased risk for sunburn.  Patient instructed to avoid sunlight, if possible.  When exposed to sunlight, patients should wear protective clothing, sunglasses, and sunscreen.  The patient was instructed to call the office immediately if the following severe adverse effects occur:  hearing changes, easy bruising/bleeding, severe headache, or vision changes.  The patient verbalized understanding of the proper use and possible adverse effects of doxycycline.  All of the patient's questions and concerns were addressed. Azithromycin Pregnancy And Lactation Text: This medication is considered safe during pregnancy and is also secreted in breast milk. Topical Clindamycin Pregnancy And Lactation Text: This medication is Pregnancy Category B and is considered safe during pregnancy. It is unknown if it is excreted in breast milk. Azelaic Acid Counseling: Patient counseled that medicine may cause skin irritation and to avoid applying near the eyes.  In the event of skin irritation, the patient was advised to reduce the amount of the drug applied or use it less frequently.   The patient verbalized understanding of the proper use and possible adverse effects of azelaic acid.  All of the patient's questions and concerns were addressed. Erythromycin Counseling:  I discussed with the patient the risks of erythromycin including but not limited to GI upset, allergic reaction, drug rash, diarrhea, increase in liver enzymes, and yeast infections. Bactrim Pregnancy And Lactation Text: This medication is Pregnancy Category D and is known to cause fetal risk.  It is also excreted in breast milk. Topical Sulfur Applications Counseling: Topical Sulfur Counseling: Patient counseled that this medication may cause skin irritation or allergic reactions.  In the event of skin irritation, the patient was advised to reduce the amount of the drug applied or use it less frequently.   The patient verbalized understanding of the proper use and possible adverse effects of topical sulfur application.  All of the patient's questions and concerns were addressed. Bactrim Counseling:  I discussed with the patient the risks of sulfa antibiotics including but not limited to GI upset, allergic reaction, drug rash, diarrhea, dizziness, photosensitivity, and yeast infections.  Rarely, more serious reactions can occur including but not limited to aplastic anemia, agranulocytosis, methemoglobinemia, blood dyscrasias, liver or kidney failure, lung infiltrates or desquamative/blistering drug rashes. Benzoyl Peroxide Pregnancy And Lactation Text: This medication is Pregnancy Category C. It is unknown if benzoyl peroxide is excreted in breast milk. Birth Control Pills Counseling: Birth Control Pill Counseling: I discussed with the patient the potential side effects of OCPs including but not limited to increased risk of stroke, heart attack, thrombophlebitis, deep venous thrombosis, hepatic adenomas, breast changes, GI upset, headaches, and depression.  The patient verbalized understanding of the proper use and possible adverse effects of OCPs. All of the patient's questions and concerns were addressed. Spironolactone Counseling: Patient advised regarding risks of diarrhea, abdominal pain, hyperkalemia, birth defects (for female patients), liver toxicity and renal toxicity. The patient may need blood work to monitor liver and kidney function and potassium levels while on therapy. The patient verbalized understanding of the proper use and possible adverse effects of spironolactone.  All of the patient's questions and concerns were addressed. Isotretinoin Pregnancy And Lactation Text: This medication is Pregnancy Category X and is considered extremely dangerous during pregnancy. It is unknown if it is excreted in breast milk. Topical Retinoid Pregnancy And Lactation Text: This medication is Pregnancy Category C. It is unknown if this medication is excreted in breast milk. Winlevi Counseling:  I discussed with the patient the risks of topical clascoterone including but not limited to erythema, scaling, itching, and stinging. Patient voiced their understanding. Tetracycline Counseling: Patient counseled regarding possible photosensitivity and increased risk for sunburn.  Patient instructed to avoid sunlight, if possible.  When exposed to sunlight, patients should wear protective clothing, sunglasses, and sunscreen.  The patient was instructed to call the office immediately if the following severe adverse effects occur:  hearing changes, easy bruising/bleeding, severe headache, or vision changes.  The patient verbalized understanding of the proper use and possible adverse effects of tetracycline.  All of the patient's questions and concerns were addressed. Patient understands to avoid pregnancy while on therapy due to potential birth defects. High Dose Vitamin A Pregnancy And Lactation Text: High dose vitamin A therapy is contraindicated during pregnancy and breast feeding.

## 2025-01-22 NOTE — DISCHARGE NOTE PROVIDER - NSDCCPTREATMENT_GEN_ALL_CORE_FT
PRINCIPAL PROCEDURE  Procedure: Complete transthoracic echocardiography (TTE)  Findings and Treatment: CONCLUSIONS:      1. Left ventricular systolic function is hyperdynamic with an ejection fraction of 71 % by King's method of disks.   2. Normal right ventricular systolic function.   3. Left atrium is severely dilated.   4. Compared to the transthoracic echocardiogram performed on 12/8/2023, the previously noted severe mitral regurgitation is no longer present. The systolic blood pressure is 15 mm Hg lower on this study.   5. Moderate mitral valve stenosis.

## 2025-01-22 NOTE — DISCHARGE NOTE PROVIDER - HOSPITAL COURSE
Pt is an 88 yo F with PMH a-fib (eliquis), CHF, HTN, HLD, PE (s/p tx), GERD, COPD/asthma, former TUD, MDD, and vitamin D deficiency p/w SOB, difficulty ambulating, weight loss, and constipation. Was evaluated by PCP and referred to ER d/t hypoxia to 90% on RA.     Acute on chronic heart failure  - pt p/w SOB, difficulty ambulating, and hypoxia, referred from PCP to ER for evaluation  - chart review shows pt f/w cards Dr. Perdue and recently changed entresto to spironolactone d/t cost issues  - TTE with EF 71%, LA sev dil, and mod MV stenosis  - cards consulted, recommending holding diuresis and GDMT until Cr resolves   - s/p lasix 40mg IV --> euvolemic; hold further diuresis     Atrial fibrillation with RVR  - known hx a-fib on eliquis 2.5mg BID and toprol 25mg daily  - s/p digoxin and lopressor in ER --> now rate controlled  - c/w lopressor 50mg BID  - c/w home eliquis    Constipation  - pt without BM x few days despite laxatives at home; has had this issue in the past  - abd XR benign  - on bowel regimen; s/p fleet enema 1/20. Having bowel movements   - c/w daily miralax.    JAY  - suspect multifactorial in setting of poor PO intake and CHF exacerbation with likely cardiorenal component  - Resolved     Case discussed with Dr. Montoya on 1/22/25. The patient is medically stable for discharge. Pt is an 89 yo F with PMH a-fib (eliquis), CHF, HTN, HLD, PE (s/p tx), GERD, COPD/asthma, former TUD, MDD, and vitamin D deficiency p/w SOB, difficulty ambulating, weight loss, and constipation. Was evaluated by PCP and referred to ER d/t hypoxia to 90% on RA. On arrival, -140s and requiring 3L NC; EKG a-fib RVR without ischemia. Labs with JAY and elevated BNP. CXR with perihilar haziness and L effusion c/w CHF, XR abd neg, TTE with EF 71%, LA sev dil, and mod MV stenosis. Now weaned to RA. Received digoxin and lasix IV; now rate controlled and on lopressor. EP following and cards s/o; on eliquis. PT recs AMOL, stable for DC.    Acute on chronic heart failure  - pt p/w SOB, difficulty ambulating, and hypoxia, referred from PCP to ER for evaluation  - chart review shows pt f/w cards Dr. Perdue and recently changed entresto to spironolactone d/t cost issues  - TTE with EF 71%, LA sev dil, and mod MV stenosis  - cards consulted, recommending holding diuresis and GDMT until Cr resolves   - c/w spironolactone 12.5mg daily  - s/p lasix 40mg IV --> euvolemic; hold further diuresis     Atrial fibrillation with RVR  - known hx a-fib on eliquis 2.5mg BID and toprol 25mg daily  - s/p digoxin and lopressor in ER --> now rate controlled  - c/w lopressor 12.5mg BID  - c/w home eliquis    Constipation  - pt without BM x few days despite laxatives at home; has had this issue in the past  - abd XR benign  - on bowel regimen; s/p fleet enema 1/20. Having bowel movements   - c/w daily miralax.    JAY  - suspect multifactorial in setting of poor PO intake and CHF exacerbation with likely cardiorenal component  - Resolved     Case discussed with Dr. Montoya on 1/22/25. The patient is medically stable for discharge.

## 2025-01-22 NOTE — DISCHARGE NOTE PROVIDER - NSDCFUADDAPPT_GEN_ALL_CORE_FT
APPTS ARE READY TO BE MADE: [x] YES    Best Family or Patient Contact (if needed):    Additional Information about above appointments (if needed):    1:   2:   3:     Other comments or requests:    APPTS ARE READY TO BE MADE: [x] YES    Best Family or Patient Contact (if needed):    Additional Information about above appointments (if needed):    1:   2:   3:     Other comments or requests:     Patient is being discharged to SNF. Caregiver will arrange follow up.

## 2025-01-22 NOTE — DISCHARGE NOTE PROVIDER - CARE PROVIDERS DIRECT ADDRESSES
[email or web address]

## 2025-01-22 NOTE — DISCHARGE NOTE PROVIDER - PROVIDER TOKENS
PROVIDER:[TOKEN:[[id number]]] PROVIDER:[TOKEN:[[id number]]],PROVIDER:[TOKEN:[[id number]],FOLLOWUP:[1 week]]

## 2025-01-22 NOTE — DISCHARGE NOTE PROVIDER - NSDCCPCAREPLAN_GEN_ALL_CORE_FT
PRINCIPAL DISCHARGE DIAGNOSIS  Diagnosis: CHF exacerbation  Assessment and Plan of Treatment: You were treated for a heart failure exacerbation with IV lasix and have improved. Please take all your medications as ordered and follow up with your cardiologist when discharged from rehab.      SECONDARY DISCHARGE DIAGNOSES  Diagnosis: HTN (hypertension)  Assessment and Plan of Treatment: Continue blood pressure medications as prescribed. Routine follow up with your PCP for blood pressure checks and medication management. A low salt diet is recommended.    Diagnosis: Constipation  Assessment and Plan of Treatment: Take daily miralax to prevent further constipation episodes.    Diagnosis: Atrial fibrillation with RVR  Assessment and Plan of Treatment: Your metoprolol dose was adjusted and your heart rate has improved. Continue with metoprolol and your blood thinner Eliquis.     PRINCIPAL DISCHARGE DIAGNOSIS  Diagnosis: CHF exacerbation  Assessment and Plan of Treatment: You were treated for a heart failure exacerbation with IV lasix and have improved. Please take all your medications as ordered and follow up with your cardiologist when discharged from rehab. Given your improved heart function and brief elevation in kidney numbers, you were not discharged on lasix (diuretic). Your spironolactone was resumed at 12.5mg daily.      SECONDARY DISCHARGE DIAGNOSES  Diagnosis: Atrial fibrillation with RVR  Assessment and Plan of Treatment: Your metoprolol dose was adjusted to 12.5mg twice a day and your heart rate has improved. Continue with metoprolol and your blood thinner Eliquis.    Diagnosis: Constipation  Assessment and Plan of Treatment: Take daily miralax to prevent further constipation episodes. You required an enema in the hospital once to facilitate bowel movements.    Diagnosis: HTN (hypertension)  Assessment and Plan of Treatment: Continue blood pressure medications as prescribed. Routine follow up with your PCP for blood pressure checks and medication management. A low salt diet is recommended.

## 2025-01-22 NOTE — DISCHARGE NOTE PROVIDER - CARE PROVIDER_API CALL
Kendall Perdue  Cardiovascular Disease  [address] [place] [address]  Phone: [phone]  Fax: [phone]  Follow Up Time:    Kendall Perdue  Cardiovascular Disease  [address] [place] [address]  Phone: [phone]  Fax: [phone]  Follow Up Time:     Tasha Rivera  Family Medicine  [address] [place] [address]  Phone: [phone]  Follow Up Time: 1 week

## 2025-01-22 NOTE — DISCHARGE NOTE NURSING/CASE MANAGEMENT/SOCIAL WORK - NSDCCRNAME_GEN_ALL_CORE_FT
1 - [place] [facility], [address] [facility], [place]  2 - [address] [place]

## 2025-01-22 NOTE — DISCHARGE NOTE PROVIDER - ATTENDING DISCHARGE PHYSICAL EXAMINATION:
Vital Signs Last 24 Hrs  T(C): 36.4 (22 Jan 2025 10:20), Max: 37 (21 Jan 2025 17:17)  T(F): 97.6 (22 Jan 2025 10:20), Max: 98.6 (21 Jan 2025 17:17)  HR: 68 (22 Jan 2025 10:20) (55 - 68)  BP: 131/73 (22 Jan 2025 10:20) (124/66 - 142/78)  BP(mean): --  RR: 17 (22 Jan 2025 10:20) (16 - 18)  SpO2: 98% (22 Jan 2025 10:20) (97% - 98%)    Parameters below as of 22 Jan 2025 10:20  Patient On (Oxygen Delivery Method): room air    PHYSICAL EXAM:  Constitutional: resting comfortably in bed; NAD; elderly frail F   Head: NC/AT  Eyes: PERRL, EOMI, anicteric sclera  ENT: no nasal discharge; MMM  Neck: supple; no JVD  Respiratory: bibasilar crackles BL; comfortable on RA without respiratory distress  Cardiac: +S1/S2; irregularly irregular in 90s; no M/R/G  Gastrointestinal: soft, NT/ND; no rebound or guarding; +BSx4  Extremities: WWP, no clubbing or cyanosis; no peripheral edema  Musculoskeletal: NROM x4; no joint swelling, tenderness or erythema  Neurologic: AAOx3; CNII-XII grossly intact; no focal deficits  Psychiatric: affect and characteristics of appearance, verbalizations, behaviors are appropriate

## 2025-01-22 NOTE — DISCHARGE NOTE PROVIDER - NSDCMRMEDTOKEN_GEN_ALL_CORE_FT
amLODIPine 5 mg oral tablet: 1 tab(s) orally once a day  apixaban 2.5 mg oral tablet: 1 tab(s) orally every 12 hours  aspirin 81 mg oral delayed release tablet: 1 tab(s) orally once a day  aspirin 81 mg oral tablet: 1 tab(s) orally once a day  atorvastatin 20 mg oral tablet: 1 tab(s) orally once a day  buPROPion 75 mg oral tablet: 1 tab(s) orally 2 times a day  cholecalciferol oral tablet: 1000 unit(s) orally once a day  Farxiga 10 mg oral tablet: 1 tab(s) orally once a day  gabapentin 400 mg oral capsule: 1 cap(s) orally 2 times a day  Lasix 20 mg oral tablet: 1 tab(s) orally 2 times a day On Monday, Wednesday, Friday, and Sunday  Lasix 20 mg oral tablet: 1 tab(s) orally once a day on Tuesday, Thursday, and Saturday  Lasix 20 mg oral tablet: 1 tab(s) orally once a day on Tuesday, Thursday, and Saturday  omeprazole 20 mg oral delayed release capsule: 1 cap(s) orally once a day  pantoprazole 40 mg oral delayed release tablet: 1 tab(s) orally once a day (before a meal)  Paxil CR 12.5 mg oral tablet, extended release: 1 tab(s) orally once a day  spironolactone 25 mg oral tablet: 0.5 tab(s) orally once a day  traMADol 50 mg oral tablet: 1 tab(s) orally every 8 hours   acetaminophen 325 mg oral tablet: 2 tab(s) orally every 6 hours As needed Mild Pain (1 - 3)  apixaban 2.5 mg oral tablet: 1 tab(s) orally every 12 hours  aspirin 81 mg oral delayed release tablet: 1 tab(s) orally once a day  atorvastatin 20 mg oral tablet: 1 tab(s) orally once a day  buPROPion 150 mg/24 hours (XL) oral tablet, extended release: 1 tab(s) orally once a day  cholecalciferol oral tablet: 1000 unit(s) orally once a day  Farxiga 10 mg oral tablet: 1 tab(s) orally once a day  gabapentin 400 mg oral capsule: 1 cap(s) orally 2 times a day  metoprolol tartrate 25 mg oral tablet: 0.5 tablet with sensor orally 2 times a day  pantoprazole 40 mg oral delayed release tablet: 1 tab(s) orally once a day (before a meal)  Paxil CR 12.5 mg oral tablet, extended release: 1 tab(s) orally once a day  polyethylene glycol 3350 oral powder for reconstitution: 17 gram(s) orally once a day  spironolactone 25 mg oral tablet: 0.5 tab(s) orally once a day  traMADol 50 mg oral tablet: 1 tab(s) orally every 8 hours

## 2025-01-22 NOTE — DISCHARGE NOTE NURSING/CASE MANAGEMENT/SOCIAL WORK - PATIENT PORTAL LINK FT
You can access the FollowMyHealth Patient Portal offered by [facility] by registering at the following website: [email or web address]. By joining [facility]’s FollowMyHealth portal, you will also be able to view your health information using other applications (apps) compatible with our system.